# Patient Record
Sex: FEMALE | Race: WHITE | NOT HISPANIC OR LATINO | Employment: FULL TIME | URBAN - METROPOLITAN AREA
[De-identification: names, ages, dates, MRNs, and addresses within clinical notes are randomized per-mention and may not be internally consistent; named-entity substitution may affect disease eponyms.]

---

## 2017-10-25 ENCOUNTER — APPOINTMENT (OUTPATIENT)
Dept: GENERAL RADIOLOGY | Facility: HOSPITAL | Age: 27
End: 2017-10-25

## 2017-10-25 ENCOUNTER — HOSPITAL ENCOUNTER (EMERGENCY)
Facility: HOSPITAL | Age: 27
Discharge: HOME | End: 2017-10-25
Attending: EMERGENCY MEDICINE | Admitting: EMERGENCY MEDICINE

## 2017-10-25 VITALS
SYSTOLIC BLOOD PRESSURE: 110 MMHG | HEIGHT: 60 IN | DIASTOLIC BLOOD PRESSURE: 63 MMHG | HEART RATE: 74 BPM | WEIGHT: 170 LBS | BODY MASS INDEX: 33.38 KG/M2 | TEMPERATURE: 97.6 F | OXYGEN SATURATION: 99 % | RESPIRATION RATE: 18 BRPM

## 2017-10-25 DIAGNOSIS — F41.9 ANXIETY: Primary | ICD-10-CM

## 2017-10-25 LAB
ANION GAP SERPL CALCULATED.3IONS-SCNC: 11.7 MMOL/L
BASOPHILS # BLD AUTO: 0.02 10*3/MM3 (ref 0–0.2)
BASOPHILS NFR BLD AUTO: 0.3 % (ref 0–1.5)
BUN BLD-MCNC: 13 MG/DL (ref 6–20)
BUN/CREAT SERPL: 20.6 (ref 7–25)
CALCIUM SPEC-SCNC: 9.2 MG/DL (ref 8.6–10.5)
CHLORIDE SERPL-SCNC: 103 MMOL/L (ref 98–107)
CO2 SERPL-SCNC: 26.3 MMOL/L (ref 22–29)
CREAT BLD-MCNC: 0.63 MG/DL (ref 0.57–1)
D DIMER PPP FEU-MCNC: <0.27 MCGFEU/ML (ref 0–0.49)
DEPRECATED RDW RBC AUTO: 40.5 FL (ref 37–54)
EOSINOPHIL # BLD AUTO: 0.13 10*3/MM3 (ref 0–0.7)
EOSINOPHIL NFR BLD AUTO: 2.2 % (ref 0.3–6.2)
ERYTHROCYTE [DISTWIDTH] IN BLOOD BY AUTOMATED COUNT: 12 % (ref 11.7–13)
GFR SERPL CREATININE-BSD FRML MDRD: 115 ML/MIN/1.73
GLUCOSE BLD-MCNC: 93 MG/DL (ref 65–99)
HCG SERPL QL: NEGATIVE
HCT VFR BLD AUTO: 43.3 % (ref 35.6–45.5)
HGB BLD-MCNC: 14.1 G/DL (ref 11.9–15.5)
IMM GRANULOCYTES # BLD: 0 10*3/MM3 (ref 0–0.03)
IMM GRANULOCYTES NFR BLD: 0 % (ref 0–0.5)
LYMPHOCYTES # BLD AUTO: 1.48 10*3/MM3 (ref 0.9–4.8)
LYMPHOCYTES NFR BLD AUTO: 25 % (ref 19.6–45.3)
MCH RBC QN AUTO: 30.7 PG (ref 26.9–32)
MCHC RBC AUTO-ENTMCNC: 32.6 G/DL (ref 32.4–36.3)
MCV RBC AUTO: 94.3 FL (ref 80.5–98.2)
MONOCYTES # BLD AUTO: 0.42 10*3/MM3 (ref 0.2–1.2)
MONOCYTES NFR BLD AUTO: 7.1 % (ref 5–12)
NEUTROPHILS # BLD AUTO: 3.86 10*3/MM3 (ref 1.9–8.1)
NEUTROPHILS NFR BLD AUTO: 65.4 % (ref 42.7–76)
PLATELET # BLD AUTO: 186 10*3/MM3 (ref 140–500)
PMV BLD AUTO: 10.2 FL (ref 6–12)
POTASSIUM BLD-SCNC: 4.2 MMOL/L (ref 3.5–5.2)
RBC # BLD AUTO: 4.59 10*6/MM3 (ref 3.9–5.2)
SODIUM BLD-SCNC: 141 MMOL/L (ref 136–145)
WBC NRBC COR # BLD: 5.91 10*3/MM3 (ref 4.5–10.7)

## 2017-10-25 PROCEDURE — 85379 FIBRIN DEGRADATION QUANT: CPT | Performed by: NURSE PRACTITIONER

## 2017-10-25 PROCEDURE — 90791 PSYCH DIAGNOSTIC EVALUATION: CPT | Performed by: SOCIAL WORKER

## 2017-10-25 PROCEDURE — 85025 COMPLETE CBC W/AUTO DIFF WBC: CPT | Performed by: EMERGENCY MEDICINE

## 2017-10-25 PROCEDURE — 80048 BASIC METABOLIC PNL TOTAL CA: CPT | Performed by: EMERGENCY MEDICINE

## 2017-10-25 PROCEDURE — 84703 CHORIONIC GONADOTROPIN ASSAY: CPT | Performed by: EMERGENCY MEDICINE

## 2017-10-25 PROCEDURE — 99284 EMERGENCY DEPT VISIT MOD MDM: CPT

## 2017-10-25 PROCEDURE — 71020 HC CHEST PA AND LATERAL: CPT

## 2017-10-25 PROCEDURE — 96360 HYDRATION IV INFUSION INIT: CPT

## 2017-10-25 PROCEDURE — 93010 ELECTROCARDIOGRAM REPORT: CPT | Performed by: INTERNAL MEDICINE

## 2017-10-25 PROCEDURE — 93005 ELECTROCARDIOGRAM TRACING: CPT | Performed by: NURSE PRACTITIONER

## 2017-10-25 RX ADMIN — SODIUM CHLORIDE 500 ML: 9 INJECTION, SOLUTION INTRAVENOUS at 09:43

## 2017-10-25 NOTE — ED PROVIDER NOTES
Pt reported to ED for trouble breathing and anxiety.  Patient reports a dry cough.  Patient is a smoker.    Upon exam HENT is unremarkable, no lower extremity tenderness or edema. Lung clear, CV- RRR.    Xray neg    EKG           EKG time: 0939  Rhythm/Rate: Normal, 68  P waves and LA: Normal  QRS, axis: Normal   ST and T waves: Normal     Interpreted Contemporaneously by me, independently viewed  There is no prior for comparison    I supervised care provided by the midlevel provider.    We have discussed this patient's history, physical exam, and treatment plan.   I have reviewed the note and personally saw and examined the patient and agree with the plan of care.    Documentation assistance provided by keena Freeman for Dr. Gurrola.  Information recorded by the scribe was done at my direction and has been verified and validated by me.              Kaity Freeman  10/25/17 0946       Faisal Gurrola MD  10/25/17 1515       Faisal Gurrola MD  10/25/17 8052

## 2017-10-25 NOTE — DISCHARGE INSTRUCTIONS
Continue current home medications  Rest and relax  Follow up with pmd as needed  Follow up with outpatient counseling as per [facility] recommendations  Return to er for thoughts of harming yourself, worsening anxiety or any new or worsening symptoms

## 2017-10-25 NOTE — CONSULTS
26 y/o  cauc mother of a 9 year old coming to the ED due to some sleep issues and multiple stressors.  She take temazepam to help w/ sleep. Usually she has also the whole prescription at the end of the month.  Patient states she has been taking it several times a week lately.  She states her appetite is fine.  She reports high anxiety and constantly is thinking.  She states she is in constant need to take a deep breath b/c she thinks that she is expanding too deep. She states that she is working 3 jobs. 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 She is raising her 10 y/o son.  She works 3 jobs.  She denies any hx of SI or suicide attempts.  No hx of wishing to be dead.    Patient has been prescribed temazepam by her PMD.  She denies any other mental health treatment.    Patient lives w/ her 10 y/o son. She is employed in a full-time job and 2 part time jobs. One of the jobs is a  at [employer].  She has an associates degree and cosmotology degree.  She lives in a house in [facility].    Patient is alert and O X 4.  No SI or HI.  No a/v hallucinations. She denies being depressed.  She states it is anxiety.  Patient is open to seeing a mental health provider once she is able to get insurance.  She was provided a list of outpatient providers and informed about grief counseling through [facility]. Discussed case w/ her b/f and w/ KEVEN Eubanks.  Cha is agreeable w/ discharge. B/f denied concerns a/b safety.  .

## 2017-10-25 NOTE — ED TRIAGE NOTES
PT TO ED FOR SOA X3 WEEKS, CHEST TIGHTNESS X2 DAYS. States took Temazepam for her panic attacks twice in the last three weeks, last dose was Monday, denies improvement in symptoms

## 2017-10-25 NOTE — ED PROVIDER NOTES
EMERGENCY DEPARTMENT ENCOUNTER    CHIEF COMPLAINT  Chief Complaint: anxiety  History given by: patient  History limited by: N/A  Room Number:   PMD: Nubia Sinclair MD      HPI:  Pt is a 27 y.o. female who has longstanding hx of anxiety. She presents with worsening anxiety that started about 3 weeks ago. She has also had associated dyspnea and chest tightness. These sx start when she awakens in the morning and persist throughout the day. However, she is able to sleep at night without sx. She denies weakness, dizziness, fevers, chills, cough, abd pain, sweating, N/V/D, recent travel, and recent surgery. 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 She takes temazepam at night for sleep and has been taking it more frequently. She notes that she does not see a therapist or a psychiatrist for her anxiety. She is a smoker and has contraceptive implant in place. Past Medical History of anxiety.     Duration: started about 3 weeks ago  Timing: intermittent   Location: psych  Radiation: none  Quality: none  Intensity/Severity: moderate  Progression: worsening  Associated Symptoms: dyspnea, chest tightness  Aggravating Factors: increased stress   Alleviating Factors: none  Previous Episodes: Pt has longstanding hx of anxiety.   Treatment before arrival: none    PAST MEDICAL HISTORY  Active Ambulatory Problems     Diagnosis Date Noted   • No Active Ambulatory Problems     Resolved Ambulatory Problems     Diagnosis Date Noted   • No Resolved Ambulatory Problems     Past Medical History:   Diagnosis Date   • Anxiety        PAST SURGICAL HISTORY  Past Surgical History:   Procedure Laterality Date   • ADENOIDECTOMY     •  SECTION     • CHOLECYSTECTOMY     • MIDDLE EAR SURGERY     • MYRINGOTOMY W/ TUBES     • TONSILLECTOMY         FAMILY HISTORY  History reviewed. No pertinent family history.    SOCIAL  HISTORY  Social History     Social History   • Marital status: Single     Spouse name: N/A   • Number of children: N/A   • Years of education: N/A     Occupational History   • Not on file.     Social History Main Topics   • Smoking status: Current Every Day Smoker     Packs/day: 0.50     Types: Cigarettes   • Smokeless tobacco: Not on file   • Alcohol use Yes      Comment: occ   • Drug use: No   • Sexual activity: Not on file     Other Topics Concern   • Not on file     Social History Narrative   • No narrative on file         ALLERGIES  Prednisone    REVIEW OF SYSTEMS  Review of Systems   Constitutional: Negative for chills and fever.   HENT: Negative for sore throat.    Respiratory: Positive for chest tightness and shortness of breath.    Cardiovascular: Positive for chest pain (chest tightness).   Gastrointestinal: Negative for abdominal pain, diarrhea, nausea and vomiting.   Genitourinary: Negative for dysuria.   Musculoskeletal: Negative for back pain.   Skin: Negative for rash.   Neurological: Negative for dizziness.   Psychiatric/Behavioral: Negative for sleep disturbance. The patient is nervous/anxious (increased anxiety).        PHYSICAL EXAM  ED Triage Vitals   Temp Heart Rate Resp BP SpO2   10/25/17 0838 10/25/17 0838 10/25/17 0838 10/25/17 0845 10/25/17 0838   97.9 °F (36.6 °C) 111 20 125/88 98 % WNL     Physical Exam   Constitutional: She is oriented to person, place, and time and well-developed, well-nourished, and in no distress.   HENT:   Head: Normocephalic.   Mouth/Throat: Mucous membranes are normal.   Eyes: No scleral icterus.   Neck: Normal range of motion.   Cardiovascular: Normal rate, regular rhythm and normal heart sounds.    Pulmonary/Chest: Effort normal and breath sounds normal. No respiratory distress.   Abdominal: Soft. There is no tenderness. There is no rebound and no guarding.   Musculoskeletal: Normal range of motion.   Neurological: She is alert and oriented to person, place, and  time. She has normal motor skills and normal sensation.   Skin: Skin is warm and dry.   Psychiatric: Mood and affect normal.   Nursing note and vitals reviewed.      LAB RESULTS  Recent Results (from the past 24 hour(s))   D-dimer, Quantitative    Collection Time: 10/25/17  9:41 AM   Result Value Ref Range    D-Dimer, Quantitative <0.27 0.00 - 0.49 MCGFEU/mL   Basic Metabolic Panel    Collection Time: 10/25/17  9:41 AM   Result Value Ref Range    Glucose 93 65 - 99 mg/dL    BUN 13 6 - 20 mg/dL    Creatinine 0.63 0.57 - 1.00 mg/dL    Sodium 141 136 - 145 mmol/L    Potassium 4.2 3.5 - 5.2 mmol/L    Chloride 103 98 - 107 mmol/L    CO2 26.3 22.0 - 29.0 mmol/L    Calcium 9.2 8.6 - 10.5 mg/dL    eGFR Non African Amer 115 >60 mL/min/1.73    BUN/Creatinine Ratio 20.6 7.0 - 25.0    Anion Gap 11.7 mmol/L   hCG, Serum, Qualitative    Collection Time: 10/25/17  9:41 AM   Result Value Ref Range    HCG Qualitative Negative Indeterminate, Negative   CBC Auto Differential    Collection Time: 10/25/17  9:41 AM   Result Value Ref Range    WBC 5.91 4.50 - 10.70 10*3/mm3    RBC 4.59 3.90 - 5.20 10*6/mm3    Hemoglobin 14.1 11.9 - 15.5 g/dL    Hematocrit 43.3 35.6 - 45.5 %    MCV 94.3 80.5 - 98.2 fL    MCH 30.7 26.9 - 32.0 pg    MCHC 32.6 32.4 - 36.3 g/dL    RDW 12.0 11.7 - 13.0 %    RDW-SD 40.5 37.0 - 54.0 fl    MPV 10.2 6.0 - 12.0 fL    Platelets 186 140 - 500 10*3/mm3    Neutrophil % 65.4 42.7 - 76.0 %    Lymphocyte % 25.0 19.6 - 45.3 %    Monocyte % 7.1 5.0 - 12.0 %    Eosinophil % 2.2 0.3 - 6.2 %    Basophil % 0.3 0.0 - 1.5 %    Immature Grans % 0.0 0.0 - 0.5 %    Neutrophils, Absolute 3.86 1.90 - 8.10 10*3/mm3    Lymphocytes, Absolute 1.48 0.90 - 4.80 10*3/mm3    Monocytes, Absolute 0.42 0.20 - 1.20 10*3/mm3    Eosinophils, Absolute 0.13 0.00 - 0.70 10*3/mm3    Basophils, Absolute 0.02 0.00 - 0.20 10*3/mm3    Immature Grans, Absolute 0.00 0.00 - 0.03 10*3/mm3       I ordered the above labs and reviewed the results      RADIOLOGY             XR Chest 2 View (Final result) Result time: 10/25/17 10:51:14     Final result by Thang Guevara MD (10/25/17 10:51:14)     Narrative:     TWO VIEWS CHEST      HISTORY: Shortness of air.     FINDINGS: Two views of the chest demonstrates the heart to be within  normal limits in size. There is no evidence of focal infiltrate,  effusion or of congestive failure.     This report was finalized on 10/25/2017 10:51 AM by Dr. Thang Guevara MD.               I ordered the above noted radiological studies and reviewed the images on the PACS system.         EKG    EKG was interpreted by Dr. Gurrola. See Dr Gurrola's note for EKG interpretation.       PROGRESS AND CONSULTS  9:10 AM- Consulted [facility].   9:25 AM- Reviewed pt's history and workup with Dr. Gurrola.  At bedside evaluation, they agree with the plan of care.  9:38 AM- Ordered IV fluids for hydration.   11:07 AM- Rechecked pt. She is resting comfortably and is in no acute distress. Discussed with pt about all pertinent results including normal d-dimer, stable EKG findings, negative troponin, and nothing acute indicated on CXR. Informed pt that [facility] evaluation is pending.   12:20 PM- Discussed case with [facility] RN  Reviewed history, exam, results and treatments.  Discussed concerns and plan of care. [facility] RN has seen and evaluated pt in ED and has cleared pt for discharge. [facility] RN has provided pt with outpatient therapy/counseling referrals.   12:30 PM- Rechecked pt. She continues to rest comfortably and remains in no acute distress. Reviewed implications of results, diagnosis, meds, responsibility to follow up, warning signs and symptoms of possible worsening, potential complications and reasons to return to ER with patient.  Discussed all results and noted any abnormalities with patient.  Discussed absolute need to recheck abnormalities and condition with PMD and with outpatient therapy/counseling referrals as recommended by [facility].  Discussed plan  "for discharge, as there is no emergent indication for admission.  Pt is agreeable and understands need for follow up and repeat testing.  Pt is aware that discharge does not mean that nothing is wrong but it indicates no emergency is present.  Pt is discharged with instructions to follow up with primary care doctor to have their blood pressure rechecked.       DIAGNOSIS  Final diagnoses:   Anxiety       FOLLOW UP   Nubia Sinclair MD  [address] [place] [id number]  [phone]    Call  As needed    as per [facility] recommendation            COURSE & MEDICAL DECISION MAKING  Pertinent Labs and Imaging studies that were ordered and reviewed are noted above.  Results were reviewed/discussed with the patient and they were also made aware of online assess.   Pt also made aware that some labs, such as cultures, will not be resulted during ER visit and follow up with PMD is necessary.     MEDICATIONS GIVEN IN ER  Medications   sodium chloride 0.9 % bolus 500 mL (0 mL Intravenous Stopped 10/25/17 1050)       /70 (Patient Position: Lying)  Pulse 70  Temp 97.9 °F (36.6 °C) (Tympanic)   Resp 18  Ht 60\" (152.4 cm)  Wt 170 lb (77.1 kg)  LMP 10/17/2017  SpO2 97%  BMI 33.2 kg/m2      I personally reviewed the past medical history, past surgical history, social history, family history, current medications and allergies as they appear in this chart.  The scribe's note accurately reflects the work and decisions made by me.     Documentation assistance provided by keena Romero for ZENIA Schwartz on 10/25/2017 at 12:38 PM. Information recorded by the scribe was done at my direction and has been verified and validated by me.     Jessica Romero  10/25/17 1244       KEVEN Carr  10/25/17 1502    "

## 2018-04-23 ENCOUNTER — HOSPITAL ENCOUNTER (EMERGENCY)
Facility: HOSPITAL | Age: 28
Discharge: HOME | End: 2018-04-23
Attending: EMERGENCY MEDICINE | Admitting: EMERGENCY MEDICINE

## 2018-04-23 VITALS
DIASTOLIC BLOOD PRESSURE: 62 MMHG | TEMPERATURE: 98.7 F | BODY MASS INDEX: 33.13 KG/M2 | HEIGHT: 62 IN | SYSTOLIC BLOOD PRESSURE: 105 MMHG | WEIGHT: 180 LBS | OXYGEN SATURATION: 98 % | RESPIRATION RATE: 15 BRPM | HEART RATE: 84 BPM

## 2018-04-23 DIAGNOSIS — A08.4 VIRAL GASTROENTERITIS: Primary | ICD-10-CM

## 2018-04-23 LAB
ALBUMIN SERPL-MCNC: 4.3 G/DL (ref 3.5–5.2)
ALBUMIN/GLOB SERPL: 1.4 G/DL
ALP SERPL-CCNC: 49 U/L (ref 40–129)
ALT SERPL W P-5'-P-CCNC: 16 U/L (ref 5–33)
ANION GAP SERPL CALCULATED.3IONS-SCNC: 12.5 MMOL/L
AST SERPL-CCNC: 16 U/L (ref 5–32)
BACTERIA UR QL AUTO: ABNORMAL /HPF
BASOPHILS # BLD AUTO: 0.02 10*3/MM3 (ref 0–0.2)
BASOPHILS NFR BLD AUTO: 0.2 % (ref 0–2)
BILIRUB SERPL-MCNC: 0.9 MG/DL (ref 0.2–1.2)
BILIRUB UR QL STRIP: ABNORMAL
BUN BLD-MCNC: 11 MG/DL (ref 6–20)
BUN/CREAT SERPL: 16.2 (ref 7–25)
CALCIUM SPEC-SCNC: 9 MG/DL (ref 8.6–10.5)
CHLORIDE SERPL-SCNC: 100 MMOL/L (ref 98–107)
CLARITY UR: CLEAR
CO2 SERPL-SCNC: 24.5 MMOL/L (ref 22–29)
COLOR UR: YELLOW
CREAT BLD-MCNC: 0.68 MG/DL (ref 0.57–1)
DEPRECATED RDW RBC AUTO: 39.2 FL (ref 37–54)
EOSINOPHIL # BLD AUTO: 0.01 10*3/MM3 (ref 0.1–0.3)
EOSINOPHIL NFR BLD AUTO: 0.1 % (ref 0–4)
ERYTHROCYTE [DISTWIDTH] IN BLOOD BY AUTOMATED COUNT: 11.7 % (ref 11.5–14.5)
GFR SERPL CREATININE-BSD FRML MDRD: 105 ML/MIN/1.73
GLOBULIN UR ELPH-MCNC: 3.1 GM/DL
GLUCOSE BLD-MCNC: 128 MG/DL (ref 65–99)
GLUCOSE UR STRIP-MCNC: NEGATIVE MG/DL
HCG SERPL QL: NEGATIVE
HCT VFR BLD AUTO: 41.5 % (ref 37–47)
HGB BLD-MCNC: 13.8 G/DL (ref 12–16)
HGB UR QL STRIP.AUTO: ABNORMAL
HOLD SPECIMEN: NORMAL
HYALINE CASTS UR QL AUTO: ABNORMAL /LPF
IMM GRANULOCYTES # BLD: 0.03 10*3/MM3 (ref 0–0.03)
IMM GRANULOCYTES NFR BLD: 0.3 % (ref 0–0.5)
KETONES UR QL STRIP: NEGATIVE
LEUKOCYTE ESTERASE UR QL STRIP.AUTO: NEGATIVE
LIPASE SERPL-CCNC: 13 U/L (ref 13–60)
LYMPHOCYTES # BLD AUTO: 0.46 10*3/MM3 (ref 0.6–4.8)
LYMPHOCYTES NFR BLD AUTO: 4.4 % (ref 20–45)
MCH RBC QN AUTO: 30.3 PG (ref 27–31)
MCHC RBC AUTO-ENTMCNC: 33.3 G/DL (ref 31–37)
MCV RBC AUTO: 91.2 FL (ref 81–99)
MONOCYTES # BLD AUTO: 0.27 10*3/MM3 (ref 0–1)
MONOCYTES NFR BLD AUTO: 2.6 % (ref 3–8)
NEUTROPHILS # BLD AUTO: 9.75 10*3/MM3 (ref 1.5–8.3)
NEUTROPHILS NFR BLD AUTO: 92.4 % (ref 45–70)
NITRITE UR QL STRIP: NEGATIVE
NRBC BLD MANUAL-RTO: 0 /100 WBC (ref 0–0)
PH UR STRIP.AUTO: 6.5 [PH] (ref 4.5–8)
PLATELET # BLD AUTO: 209 10*3/MM3 (ref 140–500)
PMV BLD AUTO: 9.8 FL (ref 7.4–10.4)
POTASSIUM BLD-SCNC: 3.9 MMOL/L (ref 3.5–5.2)
PROT SERPL-MCNC: 7.4 G/DL (ref 6–8.5)
PROT UR QL STRIP: ABNORMAL
RBC # BLD AUTO: 4.55 10*6/MM3 (ref 4.2–5.4)
RBC # UR: ABNORMAL /HPF
REF LAB TEST METHOD: ABNORMAL
SODIUM BLD-SCNC: 137 MMOL/L (ref 136–145)
SP GR UR STRIP: 1.02 (ref 1–1.03)
SQUAMOUS #/AREA URNS HPF: ABNORMAL /HPF
UROBILINOGEN UR QL STRIP: ABNORMAL
WBC NRBC COR # BLD: 10.54 10*3/MM3 (ref 4.8–10.8)
WBC UR QL AUTO: ABNORMAL /HPF
WHOLE BLOOD HOLD SPECIMEN: NORMAL
WHOLE BLOOD HOLD SPECIMEN: NORMAL

## 2018-04-23 PROCEDURE — 81001 URINALYSIS AUTO W/SCOPE: CPT | Performed by: EMERGENCY MEDICINE

## 2018-04-23 PROCEDURE — 85025 COMPLETE CBC W/AUTO DIFF WBC: CPT

## 2018-04-23 PROCEDURE — 83690 ASSAY OF LIPASE: CPT | Performed by: EMERGENCY MEDICINE

## 2018-04-23 PROCEDURE — 25010000002 ONDANSETRON PER 1 MG: Performed by: EMERGENCY MEDICINE

## 2018-04-23 PROCEDURE — 99283 EMERGENCY DEPT VISIT LOW MDM: CPT

## 2018-04-23 PROCEDURE — 84703 CHORIONIC GONADOTROPIN ASSAY: CPT | Performed by: EMERGENCY MEDICINE

## 2018-04-23 PROCEDURE — 99284 EMERGENCY DEPT VISIT MOD MDM: CPT | Performed by: EMERGENCY MEDICINE

## 2018-04-23 PROCEDURE — 96374 THER/PROPH/DIAG INJ IV PUSH: CPT

## 2018-04-23 PROCEDURE — 96361 HYDRATE IV INFUSION ADD-ON: CPT

## 2018-04-23 PROCEDURE — 80053 COMPREHEN METABOLIC PANEL: CPT | Performed by: EMERGENCY MEDICINE

## 2018-04-23 RX ORDER — CITALOPRAM 20 MG/1
20 TABLET ORAL DAILY
COMMUNITY
End: 2020-08-03 | Stop reason: SDUPTHER

## 2018-04-23 RX ORDER — PROMETHAZINE HYDROCHLORIDE 25 MG/1
25 TABLET ORAL EVERY 6 HOURS PRN
COMMUNITY
End: 2019-04-10 | Stop reason: ALTCHOICE

## 2018-04-23 RX ORDER — ONDANSETRON 2 MG/ML
8 INJECTION INTRAMUSCULAR; INTRAVENOUS ONCE
Status: COMPLETED | OUTPATIENT
Start: 2018-04-23 | End: 2018-04-23

## 2018-04-23 RX ORDER — ONDANSETRON 8 MG/1
TABLET, ORALLY DISINTEGRATING ORAL
Qty: 12 TABLET | Refills: 0 | Status: SHIPPED | OUTPATIENT
Start: 2018-04-23 | End: 2020-06-16

## 2018-04-23 RX ORDER — SODIUM CHLORIDE 0.9 % (FLUSH) 0.9 %
10 SYRINGE (ML) INJECTION AS NEEDED
Status: DISCONTINUED | OUTPATIENT
Start: 2018-04-23 | End: 2018-04-23 | Stop reason: HOSPADM

## 2018-04-23 RX ADMIN — SODIUM CHLORIDE 1000 ML: 9 INJECTION, SOLUTION INTRAVENOUS at 10:25

## 2018-04-23 RX ADMIN — ONDANSETRON 8 MG: 2 INJECTION, SOLUTION INTRAMUSCULAR; INTRAVENOUS at 10:26

## 2018-04-23 NOTE — ED PROVIDER NOTES
Subjective     History provided by:  Patient    History of Present Illness    · Chief complaint: Nausea, vomiting, diarrhea, abdominal pain    · Location: Generalized mid abdominal pain    · Quality/Severity: The patient reports severe nausea and vomiting several times.  She is now holding some liquids down.  She's also had profuse watery diarrhea with 8-10 episodes.  She describes the abdominal pain is cramping that intensifies just before she has a bowel movement or vomits.    · Timing/Onset: Symptoms started last night after she ate supper.    · Modifying Factors: Any attempt to eat or drink something exacerbates the nausea and abdominal pain and diarrhea.    · Associated symptoms: She denies any fever.  She denies any discomfort with urination, but does report decreased urinary output.  She also reports lightheadedness and headache.    · Narrative: The patient is a 27-year-old white female who developed nausea, vomiting, diarrhea, and abdominal pain last night after eating.  The patient took a Phenergan, but has vomited several times in spite of it.  She states no one else that ate with her last night has similar symptoms her past medical history significant for anxiety disorder.  Past surgical history is significant for cystectomy and .  GYN history she is currently on her menstrual period and having the lab for birth control.  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    ED Triage Vitals [18 0941]   Temp Heart Rate Resp BP SpO2   98.7 °F (37.1 °C) 112 15 140/76 98 %      Temp src Heart Rate Source Patient Position BP Location FiO2 (%)   -- Monitor -- -- --       Review of Systems   Constitutional: Negative for activity change, appetite change, chills, diaphoresis, fatigue and fever.   HENT: Negative for congestion, dental problem, ear pain, hearing loss, mouth sores, postnasal drip, rhinorrhea, sinus pressure,  sore throat, trouble swallowing and voice change.    Eyes: Negative for photophobia, pain, discharge, redness and visual disturbance.   Respiratory: Negative for cough, chest tightness, shortness of breath, wheezing and stridor.    Cardiovascular: Negative for chest pain, palpitations and leg swelling.   Gastrointestinal: Positive for abdominal pain, diarrhea, nausea and vomiting. Negative for blood in stool.   Genitourinary: Positive for vaginal bleeding. Negative for difficulty urinating, dysuria, flank pain, frequency, hematuria and urgency.   Musculoskeletal: Negative for arthralgias, back pain, gait problem, joint swelling, myalgias, neck pain and neck stiffness.   Skin: Negative for color change and rash.   Neurological: Positive for dizziness, light-headedness and headaches. Negative for tremors, seizures, syncope, facial asymmetry, speech difficulty, weakness and numbness.   Hematological: Negative for adenopathy.   Psychiatric/Behavioral: Negative.  Negative for confusion and decreased concentration. The patient is not nervous/anxious.        Past Medical History:   Diagnosis Date   • Anxiety        Allergies   Allergen Reactions   • Prednisone Hives       Past Surgical History:   Procedure Laterality Date   • ADENOIDECTOMY     •  SECTION     • CHOLECYSTECTOMY     • MIDDLE EAR SURGERY     • MYRINGOTOMY W/ TUBES     • TONSILLECTOMY         History reviewed. No pertinent family history.    Social History     Social History   • Marital status: Single     Social History Main Topics   • Smoking status: Former Smoker     Packs/day: 0.00     Years: 0.50      Comment: quit 3 months ago   • Alcohol use Yes      Comment: occ 1 GLASS WINE 2 to 3 x's week   • Drug use: No     Other Topics Concern   • Not on file           Objective   Physical Exam   Constitutional: She is oriented to person, place, and time. She appears well-developed and well-nourished. No distress.   Patient appears healthy in no acute  distress.  Review of raw vital signs: Her blood pressure was 140/76, she was tachycardic with a heart rate of 112, respirations 15, she was afebrile, oxygen saturation normal 98%.   HENT:   Head: Normocephalic and atraumatic.   Nose: Nose normal.   Mouth/Throat: Oropharynx is clear and moist. No oropharyngeal exudate.   Eyes: EOM are normal. Pupils are equal, round, and reactive to light. Right eye exhibits no discharge. Left eye exhibits no discharge. No scleral icterus.   Neck: Normal range of motion. Neck supple. No JVD present. No thyromegaly present.   Cardiovascular: Regular rhythm and normal heart sounds.    No murmur heard.  Mild tachycardia   Pulmonary/Chest: Effort normal and breath sounds normal. She has no wheezes. She has no rales. She exhibits no tenderness.   Abdominal: Soft. Bowel sounds are normal. She exhibits no distension. There is no tenderness.   Musculoskeletal: Normal range of motion. She exhibits no edema, tenderness or deformity.   Lymphadenopathy:     She has no cervical adenopathy.   Neurological: She is alert and oriented to person, place, and time. No cranial nerve deficit. Coordination normal.   No focal motor sensory deficit   Skin: Skin is warm and dry. Capillary refill takes less than 2 seconds. No rash noted. She is not diaphoretic.   Psychiatric: She has a normal mood and affect. Her behavior is normal. Judgment and thought content normal.   Nursing note and vitals reviewed.      Procedures         ED Course  ED Course   Comment By Time   The patient was administered a liter of normal saline IV and Zofran 8 mg IV which improved her nausea.  Review of her laboratory studies: Her CMP had normal electrolytes, normal renal and liver function tests.  Her BUN was normal at 11 suggesting euvolemia.  Her white blood cell count was 10.5 with a left shift.  Hemoglobin and hematocrit within normal limits.  Her urinalysis had specific gravity of 1.02, was negative for ketones, under  microscopic exam had 20-30 red blood cells with the patient is currently on her menstrual period, she had 3-5 white blood cells. Williams Milan MD 04/23 1606                  MDM  Number of Diagnoses or Management Options  Viral gastroenteritis: new and requires workup     Amount and/or Complexity of Data Reviewed  Clinical lab tests: ordered and reviewed    Patient Progress  Patient progress: improved      Final diagnoses:   Viral gastroenteritis           Labs Reviewed   COMPREHENSIVE METABOLIC PANEL - Abnormal; Notable for the following:        Result Value    Glucose 128 (*)     All other components within normal limits   URINALYSIS W/ CULTURE IF INDICATED - Abnormal; Notable for the following:     Bilirubin, UA Small (1+) (*)     Blood, UA Moderate (2+) (*)     Protein, UA 30 mg/dL (1+) (*)     All other components within normal limits   CBC WITH AUTO DIFFERENTIAL - Abnormal; Notable for the following:     Neutrophil % 92.4 (*)     Lymphocyte % 4.4 (*)     Monocyte % 2.6 (*)     Neutrophils, Absolute 9.75 (*)     Lymphocytes, Absolute 0.46 (*)     Eosinophils, Absolute 0.01 (*)     All other components within normal limits   URINALYSIS, MICROSCOPIC ONLY - Abnormal; Notable for the following:     RBC, UA 21-30 (*)     WBC, UA 3-5 (*)     Bacteria, UA Trace (*)     All other components within normal limits   LIPASE - Normal   HCG, SERUM, QUALITATIVE - Normal   RAINBOW DRAW    Narrative:     The following orders were created for panel order [place] Draw.  Procedure                               Abnormality         Status                     ---------                               -----------         ------                     Light Blue Top[[id number]]                                    Final result               Green Top (Gel)[[id number]]                                   Final result               Lavender Top[[id number]]                                      Final result               Gold Top - SST[[id number]]                                                                  Please view results for these tests on the individual orders.   CBC AND DIFFERENTIAL    Narrative:     The following orders were created for panel order CBC & Differential.  Procedure                               Abnormality         Status                     ---------                               -----------         ------                     CBC Auto Differential[[id number]]        Abnormal            Final result                 Please view results for these tests on the individual orders.   LIGHT BLUE TOP   GREEN TOP   LAVENDER TOP     No orders to display          Medication List      New Prescriptions    ondansetron ODT 8 MG disintegrating tablet  Commonly known as:  ZOFRAN-ODT  One tablet po q 6 hours PRN nausea and vomiting               Williams Milan MD  04/23/18 4019

## 2019-04-09 ENCOUNTER — DOCUMENTATION (OUTPATIENT)
Dept: CARDIOLOGY | Facility: CLINIC | Age: 29
End: 2019-04-09

## 2019-04-10 ENCOUNTER — OFFICE VISIT (OUTPATIENT)
Dept: CARDIOLOGY | Facility: CLINIC | Age: 29
End: 2019-04-10

## 2019-04-10 VITALS
DIASTOLIC BLOOD PRESSURE: 78 MMHG | WEIGHT: 204 LBS | BODY MASS INDEX: 38.51 KG/M2 | HEIGHT: 61 IN | HEART RATE: 78 BPM | SYSTOLIC BLOOD PRESSURE: 110 MMHG

## 2019-04-10 DIAGNOSIS — R00.2 PALPITATIONS: Primary | ICD-10-CM

## 2019-04-10 PROCEDURE — 99204 OFFICE O/P NEW MOD 45 MIN: CPT | Performed by: INTERNAL MEDICINE

## 2019-04-10 PROCEDURE — 93000 ELECTROCARDIOGRAM COMPLETE: CPT | Performed by: INTERNAL MEDICINE

## 2019-04-12 PROBLEM — I47.1 PAROXYSMAL SVT (SUPRAVENTRICULAR TACHYCARDIA) (HCC): Status: ACTIVE | Noted: 2019-04-12

## 2019-04-12 PROBLEM — I47.10 PAROXYSMAL SVT (SUPRAVENTRICULAR TACHYCARDIA): Status: ACTIVE | Noted: 2019-04-12

## 2019-04-12 NOTE — PROGRESS NOTES
Subjective:     Encounter Date:04/10/2019      Patient ID: Theresa Dyson is a 28 y.o. female.    Chief Complaint:  Palpitations    This is a recurrent problem. The current episode started more than 1 year ago. The problem occurs intermittently.       This is a 28-year-old white female who presents today for episodes of palpitations.  She said that about 6-8 years ago was when she first noticed him.  She said she could feel her heart skip and then race.  She said in the past month the frequency has increased to almost a daily occurrence.  She classically describes a skipped beat and then her heart starts to race going up to as high as 220 bpm.  She says it feels like her heart is going to pound out of her chest.  Over the years she has learned to do vagal maneuvers and she can usually break this.  She says however here recently that has been getting more and more difficult to do.  She had a Holter monitor 6 or 7 years ago but had no symptoms during the monitor.  Patient has not passed out.  One time by a nurse taking vital signs she did have a heart rate of 220 although is never been able to be captured on the monitor or ECG.      Review of Systems   Cardiovascular: Positive for palpitations.   All other systems reviewed and are negative.        ECG 12 Lead  Date/Time: 4/10/2019 11:31 AM  Performed by: Wiley Koch MD  Authorized by: Wiley Koch MD   Comparison: compared with previous ECG from 10/25/2017  Similar to previous ECG  Rhythm: sinus rhythm    Clinical impression: normal ECG               Objective:     Physical Exam   Constitutional: She is oriented to person, place, and time. She appears well-developed.   HENT:   Head: Normocephalic.   Eyes: Conjunctivae are normal.   Neck: Normal range of motion.   Cardiovascular: Normal rate, regular rhythm and normal heart sounds.   Pulmonary/Chest: Breath sounds normal.   Abdominal: Soft. Bowel sounds are normal.   Musculoskeletal: Normal range of  motion. She exhibits no edema.   Neurological: She is alert and oriented to person, place, and time.   Skin: Skin is warm and dry.   Psychiatric: She has a normal mood and affect. Her behavior is normal.   Vitals reviewed.      Lab Review:       Assessment:          Diagnosis Plan   1. Palpitations  Adult Transthoracic Echo Complete W/ Cont if Necessary Per Protocol          Plan:         1.  Palpitations.  Patient describes a classic SVT that is induced by a PVC.  She either has AVNRT or an accessory bypass tract.  Over the years she is learned to do vagal maneuvers to break it.  It started at about the age of 20 and has increased in frequency.  This is really classic presentation.  I will check an echocardiogram to rule out structural heart disease we had an extensive discussion about approaches at this point I think she just needs an EP study.  We discussed whether she wanted to see the electrophysiologist first or just meet him on the day of the study she is obviously going to do some research on the Internet.  When she decides what she wants to do I would proceed and setting her up.

## 2019-05-01 ENCOUNTER — HOSPITAL ENCOUNTER (OUTPATIENT)
Dept: CARDIOLOGY | Facility: HOSPITAL | Age: 29
Discharge: HOME | End: 2019-05-01
Admitting: INTERNAL MEDICINE

## 2019-05-01 VITALS
BODY MASS INDEX: 38.51 KG/M2 | OXYGEN SATURATION: 97 % | DIASTOLIC BLOOD PRESSURE: 70 MMHG | HEART RATE: 55 BPM | WEIGHT: 204 LBS | SYSTOLIC BLOOD PRESSURE: 100 MMHG | HEIGHT: 61 IN

## 2019-05-01 DIAGNOSIS — R00.2 PALPITATIONS: ICD-10-CM

## 2019-05-01 LAB
AORTIC ARCH: 2.7 CM
AORTIC DIMENSIONLESS INDEX: 0.6 (DI)
ASCENDING AORTA: 2.9 CM
BH CV ECHO MEAS - ACS: 2.1 CM
BH CV ECHO MEAS - AO MAX PG (FULL): 2.9 MMHG
BH CV ECHO MEAS - AO MAX PG: 6.2 MMHG
BH CV ECHO MEAS - AO MEAN PG (FULL): 2 MMHG
BH CV ECHO MEAS - AO MEAN PG: 3.8 MMHG
BH CV ECHO MEAS - AO ROOT AREA (BSA CORRECTED): 1.6
BH CV ECHO MEAS - AO ROOT AREA: 7 CM^2
BH CV ECHO MEAS - AO ROOT DIAM: 3 CM
BH CV ECHO MEAS - AO V2 MAX: 124.9 CM/SEC
BH CV ECHO MEAS - AO V2 MEAN: 93.8 CM/SEC
BH CV ECHO MEAS - AO V2 VTI: 25.7 CM
BH CV ECHO MEAS - ASC AORTA: 2.9 CM
BH CV ECHO MEAS - AVA(I,A): 2.2 CM^2
BH CV ECHO MEAS - AVA(I,D): 2.2 CM^2
BH CV ECHO MEAS - AVA(V,A): 2.5 CM^2
BH CV ECHO MEAS - AVA(V,D): 2.5 CM^2
BH CV ECHO MEAS - BSA(HAYCOCK): 2 M^2
BH CV ECHO MEAS - BSA: 1.9 M^2
BH CV ECHO MEAS - BZI_BMI: 38.5 KILOGRAMS/M^2
BH CV ECHO MEAS - BZI_METRIC_HEIGHT: 154.9 CM
BH CV ECHO MEAS - BZI_METRIC_WEIGHT: 92.5 KG
BH CV ECHO MEAS - EDV(MOD-SP2): 76 ML
BH CV ECHO MEAS - EDV(MOD-SP4): 69 ML
BH CV ECHO MEAS - EDV(TEICH): 127.4 ML
BH CV ECHO MEAS - EF(CUBED): 72.3 %
BH CV ECHO MEAS - EF(MOD-BP): 61 %
BH CV ECHO MEAS - EF(MOD-SP2): 57.9 %
BH CV ECHO MEAS - EF(MOD-SP4): 65.2 %
BH CV ECHO MEAS - EF(TEICH): 63.7 %
BH CV ECHO MEAS - ESV(MOD-SP2): 32 ML
BH CV ECHO MEAS - ESV(MOD-SP4): 24 ML
BH CV ECHO MEAS - ESV(TEICH): 46.3 ML
BH CV ECHO MEAS - FS: 34.8 %
BH CV ECHO MEAS - IVS/LVPW: 0.96
BH CV ECHO MEAS - IVSD: 1 CM
BH CV ECHO MEAS - LAT PEAK E' VEL: 12 CM/SEC
BH CV ECHO MEAS - LV DIASTOLIC VOL/BSA (35-75): 36.2 ML/M^2
BH CV ECHO MEAS - LV MASS(C)D: 201.4 GRAMS
BH CV ECHO MEAS - LV MASS(C)DI: 105.7 GRAMS/M^2
BH CV ECHO MEAS - LV MAX PG: 3.3 MMHG
BH CV ECHO MEAS - LV MEAN PG: 1.9 MMHG
BH CV ECHO MEAS - LV SYSTOLIC VOL/BSA (12-30): 12.6 ML/M^2
BH CV ECHO MEAS - LV V1 MAX: 90.7 CM/SEC
BH CV ECHO MEAS - LV V1 MEAN: 63.3 CM/SEC
BH CV ECHO MEAS - LV V1 VTI: 15.8 CM
BH CV ECHO MEAS - LVIDD: 5.2 CM
BH CV ECHO MEAS - LVIDS: 3.4 CM
BH CV ECHO MEAS - LVLD AP2: 7.7 CM
BH CV ECHO MEAS - LVLD AP4: 7.5 CM
BH CV ECHO MEAS - LVLS AP2: 6.1 CM
BH CV ECHO MEAS - LVLS AP4: 5.8 CM
BH CV ECHO MEAS - LVOT AREA (M): 3.5 CM^2
BH CV ECHO MEAS - LVOT AREA: 3.5 CM^2
BH CV ECHO MEAS - LVOT DIAM: 2.1 CM
BH CV ECHO MEAS - LVPWD: 1.1 CM
BH CV ECHO MEAS - MED PEAK E' VEL: 9 CM/SEC
BH CV ECHO MEAS - MV A DUR: 0.1 SEC
BH CV ECHO MEAS - MV A MAX VEL: 72.8 CM/SEC
BH CV ECHO MEAS - MV DEC SLOPE: 578.3 CM/SEC^2
BH CV ECHO MEAS - MV DEC TIME: 0.15 SEC
BH CV ECHO MEAS - MV E MAX VEL: 59.7 CM/SEC
BH CV ECHO MEAS - MV E/A: 0.82
BH CV ECHO MEAS - MV MAX PG: 5.3 MMHG
BH CV ECHO MEAS - MV MEAN PG: 2.2 MMHG
BH CV ECHO MEAS - MV P1/2T MAX VEL: 86.9 CM/SEC
BH CV ECHO MEAS - MV P1/2T: 44 MSEC
BH CV ECHO MEAS - MV V2 MAX: 115.4 CM/SEC
BH CV ECHO MEAS - MV V2 MEAN: 67.3 CM/SEC
BH CV ECHO MEAS - MV V2 VTI: 29.6 CM
BH CV ECHO MEAS - MVA P1/2T LCG: 2.5 CM^2
BH CV ECHO MEAS - MVA(P1/2T): 5 CM^2
BH CV ECHO MEAS - MVA(VTI): 1.9 CM^2
BH CV ECHO MEAS - PA ACC TIME: 0.09 SEC
BH CV ECHO MEAS - PA MAX PG (FULL): 3.4 MMHG
BH CV ECHO MEAS - PA MAX PG: 6.2 MMHG
BH CV ECHO MEAS - PA PR(ACCEL): 37.8 MMHG
BH CV ECHO MEAS - PA V2 MAX: 124.9 CM/SEC
BH CV ECHO MEAS - PI END-D VEL: 119.4 CM/SEC
BH CV ECHO MEAS - PULM A REVS DUR: 0.1 SEC
BH CV ECHO MEAS - PULM A REVS VEL: 25.2 CM/SEC
BH CV ECHO MEAS - PULM DIAS VEL: 60.1 CM/SEC
BH CV ECHO MEAS - PULM S/D: 0.87
BH CV ECHO MEAS - PULM SYS VEL: 52.4 CM/SEC
BH CV ECHO MEAS - PVA(V,A): 2.6 CM^2
BH CV ECHO MEAS - PVA(V,D): 2.6 CM^2
BH CV ECHO MEAS - QP/QS: 1.4
BH CV ECHO MEAS - RAP SYSTOLE: 3 MMHG
BH CV ECHO MEAS - RV MAX PG: 2.8 MMHG
BH CV ECHO MEAS - RV MEAN PG: 1.5 MMHG
BH CV ECHO MEAS - RV V1 MAX: 83.9 CM/SEC
BH CV ECHO MEAS - RV V1 MEAN: 54.5 CM/SEC
BH CV ECHO MEAS - RV V1 VTI: 19.6 CM
BH CV ECHO MEAS - RVOT AREA: 3.9 CM^2
BH CV ECHO MEAS - RVOT DIAM: 2.2 CM
BH CV ECHO MEAS - RVSP: 19 MMHG
BH CV ECHO MEAS - SI(AO): 95.3 ML/M^2
BH CV ECHO MEAS - SI(CUBED): 52.2 ML/M^2
BH CV ECHO MEAS - SI(LVOT): 29.1 ML/M^2
BH CV ECHO MEAS - SI(MOD-SP2): 23.1 ML/M^2
BH CV ECHO MEAS - SI(MOD-SP4): 23.6 ML/M^2
BH CV ECHO MEAS - SI(TEICH): 42.6 ML/M^2
BH CV ECHO MEAS - SUP REN AO DIAM: 1.95 CM
BH CV ECHO MEAS - SV(AO): 181.5 ML
BH CV ECHO MEAS - SV(CUBED): 99.5 ML
BH CV ECHO MEAS - SV(LVOT): 55.5 ML
BH CV ECHO MEAS - SV(MOD-SP2): 44 ML
BH CV ECHO MEAS - SV(MOD-SP4): 45 ML
BH CV ECHO MEAS - SV(RVOT): 75.7 ML
BH CV ECHO MEAS - SV(TEICH): 81.1 ML
BH CV ECHO MEAS - TAPSE (>1.6): 1.8 CM2
BH CV ECHO MEAS - TR MAX VEL: 191.4 CM/SEC
BH CV ECHO MEASUREMENTS AVERAGE E/E' RATIO: 5.69
BH CV VAS BP RIGHT ARM: NORMAL MMHG
BH CV XLRA - RV BASE: 2.4 CM
BH CV XLRA - TDI S': 11 CM/SEC
LEFT ATRIUM VOLUME INDEX: 17 ML/M2
LV EF 2D ECHO EST: 61 %
SINUS: 2.8 CM
STJ: 2.8 CM

## 2019-05-01 PROCEDURE — 93306 TTE W/DOPPLER COMPLETE: CPT

## 2019-05-01 PROCEDURE — 93306 TTE W/DOPPLER COMPLETE: CPT | Performed by: INTERNAL MEDICINE

## 2020-01-24 ENCOUNTER — OFFICE VISIT (OUTPATIENT)
Dept: OBSTETRICS AND GYNECOLOGY | Facility: CLINIC | Age: 30
End: 2020-01-24

## 2020-01-24 VITALS
SYSTOLIC BLOOD PRESSURE: 118 MMHG | BODY MASS INDEX: 42.97 KG/M2 | DIASTOLIC BLOOD PRESSURE: 62 MMHG | WEIGHT: 227.6 LBS | HEIGHT: 61 IN

## 2020-01-24 DIAGNOSIS — Z30.46 NEXPLANON REMOVAL: ICD-10-CM

## 2020-01-24 DIAGNOSIS — Z01.419 ROUTINE GYNECOLOGICAL EXAMINATION: Primary | ICD-10-CM

## 2020-01-24 DIAGNOSIS — Z30.430 ENCOUNTER FOR IUD INSERTION: ICD-10-CM

## 2020-01-24 DIAGNOSIS — Z13.9 SCREENING FOR CONDITION: ICD-10-CM

## 2020-01-24 DIAGNOSIS — Z01.419 PAP SMEAR, LOW-RISK: ICD-10-CM

## 2020-01-24 LAB
B-HCG UR QL: NEGATIVE
BILIRUB BLD-MCNC: NEGATIVE MG/DL
CLARITY, POC: CLEAR
COLOR UR: YELLOW
GLUCOSE UR STRIP-MCNC: NEGATIVE MG/DL
INTERNAL NEGATIVE CONTROL: NEGATIVE
INTERNAL POSITIVE CONTROL: POSITIVE
KETONES UR QL: NEGATIVE
LEUKOCYTE EST, POC: NEGATIVE
Lab: 55
NITRITE UR-MCNC: NEGATIVE MG/ML
PH UR: 5 [PH] (ref 5–8)
PROT UR STRIP-MCNC: NEGATIVE MG/DL
RBC # UR STRIP: NEGATIVE /UL
SP GR UR: 1 (ref 1–1.03)
UROBILINOGEN UR QL: NORMAL

## 2020-01-24 PROCEDURE — 11982 REMOVE DRUG IMPLANT DEVICE: CPT | Performed by: OBSTETRICS & GYNECOLOGY

## 2020-01-24 PROCEDURE — 81025 URINE PREGNANCY TEST: CPT | Performed by: OBSTETRICS & GYNECOLOGY

## 2020-01-24 PROCEDURE — 58300 INSERT INTRAUTERINE DEVICE: CPT | Performed by: OBSTETRICS & GYNECOLOGY

## 2020-01-24 PROCEDURE — 99385 PREV VISIT NEW AGE 18-39: CPT | Performed by: OBSTETRICS & GYNECOLOGY

## 2020-01-24 PROCEDURE — 81002 URINALYSIS NONAUTO W/O SCOPE: CPT | Performed by: OBSTETRICS & GYNECOLOGY

## 2020-01-28 NOTE — PROGRESS NOTES
Procedure: Intrauterine device insertion    Chief Complaint: IUD insertion    Pre procedure indication 1) Desires Kyleena IUD  Post procedure indication 1) Desires Kyleena IUD    The risks, benefits, and alternatives to IUD were explained at length with the patient. All her questions were answered and consents were signed.    The patient was placed in a dorsal lithotomy position on the examining table in [facility]. A bimanual exam confirmed the uterus was normal in size, anteverted. A warmed metal speculum was inserted into the vagina and the cervix was brought into view.    The cervix was prepped with Betadine. The anterior lip was grasped with a single-tooth tenaculum. The endometrial cavity was then sounded to 7 cm without use of a dilator. The IUD was removed in a sterile fashion.    The  was then carefully advanced to the cervical canal into the uterus to the level of the fundus. This was then backed off about 1.5-2 cm to allow sufficient space for the arms to open. The device was deployed. The  was removed carefully from the uterus. The threads were then cut leaving 2-3 cm visible outside of the cervix.  The single-tooth tenaculum was removed from the anterior lip. Good hemostasis was noted.     All other instruments were removed from the vagina.   There were no complications.  The patient tolerated the procedure well with a minimal amount of discomfort.    The patient was counseled about the need to return in 4 weeks for string check.     She was counseled about the need to use a backup method of contraception such as condoms for 1-2 weeks. The patient is counseled to contact us if she has any significant or increasing bleeding, pain, fever, chills, or other concerns. She is instructed to see a doctor right away if she believes that she may be pregnant at any time with the IUD in place.    Estella Soria, DO    1/28/2020  2:43 PM

## 2020-01-28 NOTE — PROGRESS NOTES
Procedure: Nexplanon removal    Chief Complaint: Nexplanon removal    Procedures      Pre Dx: 1) Nexplanon removal  Post Dx: 1) Nexplanon removal     The risks, benefits, and alternatives to remove the device have been discussed with the patient at length. All of her questions are answered. She is aware of the need for alternative means of contraception if desired. Verbal informed consent is obtained.    Able to easily palpate the device in the nondominant left arm. Additional imaging was not required. The device feels freely mobile and easily accessible. She was placed in the  supine position with her arm elevated at her side. The skin over this site is prepped with Betadine. 2-3 mL of 1% lidocaine with no epinephrine was injected.  Downward pressure was applied on the end of the implant nearest the axilla, and a 2-3 mm incision was made in a longitudinal direction of the arm at the tip of the implant closest to the elbow. The implant was then pushed gently toward the incision until the tip was visible. The fibrous capsule was opened with blunt dissection using a hemostat. The implant was grasp with a hemostat and was easily removed intact. It measured a  full 4 cm in length.    Tolerated well  No apparent complications    The skin was cleansed and dried. A Steri-Strip was applied. The arm was gently wrapped with Kerlex gauze. The patient had normal strength and sensation in her upper extremity. There was no significant bleeding. There were no complications. The patient was advised about proper care of the dressings. She was advised to call or return for complications such as fever, signs of infection, increased pain, or any other concerns.    Warning signs, limitations and expectations reviewed.     Estella Soria DO    1/28/2020  2:42 PM

## 2020-01-29 LAB
C TRACH RRNA CVX QL NAA+PROBE: NEGATIVE
CONV .: ABNORMAL
CYTOLOGIST CVX/VAG CYTO: ABNORMAL
CYTOLOGY CVX/VAG DOC CYTO: ABNORMAL
CYTOLOGY CVX/VAG DOC THIN PREP: ABNORMAL
DX ICD CODE: ABNORMAL
DX ICD CODE: ABNORMAL
HBV SURFACE AG SERPL QL IA: NEGATIVE
HCV AB S/CO SERPL IA: 0.1 S/CO RATIO (ref 0–0.9)
HIV 1 & 2 AB SER-IMP: ABNORMAL
HIV 1+2 AB+HIV1 P24 AG SERPL QL IA: NON REACTIVE
HSV1 IGG SER IA-ACNC: <0.91 INDEX (ref 0–0.9)
HSV2 IGG SER IA-ACNC: <0.91 INDEX (ref 0–0.9)
N GONORRHOEA RRNA CVX QL NAA+PROBE: NEGATIVE
OTHER STN SPEC: ABNORMAL
PATHOLOGIST CVX/VAG CYTO: ABNORMAL
RECOM F/U CVX/VAG CYTO: ABNORMAL
RPR SER QL: NON REACTIVE
STAT OF ADQ CVX/VAG CYTO-IMP: ABNORMAL
T VAGINALIS RRNA SPEC QL NAA+PROBE: NEGATIVE

## 2020-02-27 ENCOUNTER — OFFICE VISIT (OUTPATIENT)
Dept: OBSTETRICS AND GYNECOLOGY | Facility: CLINIC | Age: 30
End: 2020-02-27

## 2020-02-27 VITALS
WEIGHT: 221 LBS | HEIGHT: 61 IN | BODY MASS INDEX: 41.72 KG/M2 | SYSTOLIC BLOOD PRESSURE: 120 MMHG | DIASTOLIC BLOOD PRESSURE: 78 MMHG

## 2020-02-27 DIAGNOSIS — Z30.431 IUD CHECK UP: ICD-10-CM

## 2020-02-27 DIAGNOSIS — Z13.9 SCREENING FOR CONDITION: ICD-10-CM

## 2020-02-27 DIAGNOSIS — N63.20 LEFT BREAST MASS: ICD-10-CM

## 2020-02-27 DIAGNOSIS — R87.612 LGSIL ON PAP SMEAR OF CERVIX: Primary | ICD-10-CM

## 2020-02-27 LAB
B-HCG UR QL: NEGATIVE
INTERNAL NEGATIVE CONTROL: NEGATIVE
INTERNAL POSITIVE CONTROL: POSITIVE
Lab: NORMAL

## 2020-02-27 PROCEDURE — 57455 BIOPSY OF CERVIX W/SCOPE: CPT | Performed by: OBSTETRICS & GYNECOLOGY

## 2020-02-27 PROCEDURE — 81025 URINE PREGNANCY TEST: CPT | Performed by: OBSTETRICS & GYNECOLOGY

## 2020-02-27 PROCEDURE — 99213 OFFICE O/P EST LOW 20 MIN: CPT | Performed by: OBSTETRICS & GYNECOLOGY

## 2020-02-27 RX ORDER — LEVONORGESTREL 19.5 MG/1
1 INTRAUTERINE DEVICE INTRAUTERINE ONCE
COMMUNITY
End: 2020-10-26

## 2020-02-27 NOTE — PROGRESS NOTES
Colposcopy Procedure Note    Chief Complaint: LGSIL pap smear    Colposcopy  Date/Time: 2/27/2020 1:49 PM  Performed by: Estella Soria DO  Authorized by: Estella Soria DO   Preparation: Patient was prepped and draped in the usual sterile fashion.  Local anesthesia used: no    Anesthesia:  Local anesthesia used: no    Sedation:  Patient sedated: no    Patient tolerance: Patient tolerated the procedure well with no immediate complications          Indications: Most recent Pap smear showed: low-grade squamous intraepithelial neoplasia (LGSIL - encompassing HPV,mild dysplasia,DEBO I).  Prior cervical/vaginal disease: none.  Prior cervical treatment: no treatment.    Procedure Details   The risks and benefits of the procedure and Verbal informed consent obtained.    Speculum placed in vagina and excellent visualization of cervix achieved, cervix swabbed x 3 with acetic acid solution and Lugol's solution     Findings:  Cervix: acetowhite lesion(s) noted at 1 o'clock; cervical biopsies taken at 1 o'clock, specimen labelled and sent to pathology and hemostasis achieved with Monsel's solution.  Vaginal inspection: vaginal colposcopy not performed.  Vulvar colposcopy: vulvar colposcopy not performed.    Specimens: cervical biopsy    Complications: none.    Plan:  Specimens labelled and sent to Pathology.  Will base further treatment on Pathology findings.  Treatment options discussed with patient.    Estella Soria DO   2/27/2020  1:48 PM

## 2020-02-27 NOTE — PROGRESS NOTES
"PROBLEM VISIT    Chief Complaint: LGSIL, right breast mass, IUD check up      Theresa Dyson is a 29 y.o. patient who presents for follow up of her IUD that was placed approximately 4 weeks ago.  Patient reports that she is still bleeding although it is decreasing in amount.  Patient denies any cramping.  She denies any uncomfortability with sexual activity.  Patient had a friend who was recently diagnosed with breast cancer and her friend had the IUD as well.  The patient is questioning if the IUD can cause breast cancer.  Patient had her IUD placed at the same time of her annual on her Pap smear is returned as a low-grade squamous intraepithelial lesion.  Patient presents today also for colposcopy exam.  Patient complaining today about a left breast mass that she noted herself.  4 weeks ago at her annual exam nothing was noted and patient states that is something she noticed within the last week. It is nontender to palpation.  She has not noticed this growing in size.    Chief Complaint   Patient presents with   • Colposcopy     LGSIL   • Follow-up     string check   • Breast Problem             The following portions of the patient's history were reviewed and updated as appropriate: allergies, current medications and problem list.    Review of Systems   Constitutional: Negative for appetite change, chills, fatigue, fever and unexpected weight change.   Gastrointestinal: Negative for abdominal distention, abdominal pain, anal bleeding, blood in stool, constipation, diarrhea, nausea and vomiting.   Genitourinary: Positive for vaginal bleeding. Negative for dyspareunia, dysuria, menstrual problem, pelvic pain, vaginal discharge and vaginal pain.       /78   Ht 154.9 cm (61\")   Wt 100 kg (221 lb)   BMI 41.76 kg/m²     Physical Exam   Constitutional: She is oriented to person, place, and time. She appears well-developed and well-nourished. No distress.   Pulmonary/Chest: Left breast exhibits mass.   1-2cm " left breast mass at 6 oclock       Genitourinary: There is no rash, tenderness, lesion or injury on the right labia. There is no rash, tenderness, lesion or injury on the left labia. There is bleeding in the vagina. No erythema or tenderness in the vagina.   There is a foreign body in the vagina. No signs of injury around the vagina. No vaginal discharge found.   Genitourinary Comments: IUD string visualized   Musculoskeletal: Normal range of motion.   Neurological: She is alert and oriented to person, place, and time. No cranial nerve deficit. Coordination normal.   Skin: She is not diaphoretic.   Psychiatric: She has a normal mood and affect. Her behavior is normal. Judgment and thought content normal.   Vitals reviewed.        Assessment/Plan   Theresa was seen today for colposcopy, follow-up and breast problem.    Diagnoses and all orders for this visit:    LGSIL on Pap smear of cervix  -     Colposcopy  -     Reference Histopathology    Screening for condition  -     POC Pregnancy, Urine    Left breast mass  -     US Breast Left Complete; Future    IUD check up    29-year-old for IUD check, cervical dysplasia and complaints of left breast mass.    1) IUD checkup: Kyleena IUD placed 1/24/2020.  IUD strings noted to be in place.  Patient without complaints.  Patient reassured that the Kyleena IUD has not been shown to cause breast cancer.    2) cervical dysplasia: Patient diagnosed with a low-grade squamous intraepithelial lesion on her Pap smear.  Colposcopy performed with 1 biopsy.  Patient tolerated well.  See procedure note.    3) left breast mass.  Small 1 to 2 cm area of irregular tissue noted.  May be normal tissue but will proceed with a left breast ultrasound to fully evaluate.    4) Social: Pt is friends with Deborah Harris      No follow-ups on file.      Estella Soria DO    2/28/2020  10:12 AM

## 2020-03-02 LAB
DX ICD CODE: NORMAL
PATH REPORT.FINAL DX SPEC: NORMAL
PATH REPORT.GROSS SPEC: NORMAL
PATH REPORT.SITE OF ORIGIN SPEC: NORMAL
PATHOLOGIST NAME: NORMAL
PAYMENT PROCEDURE: NORMAL

## 2020-03-03 ENCOUNTER — HOSPITAL ENCOUNTER (OUTPATIENT)
Dept: ULTRASOUND IMAGING | Facility: HOSPITAL | Age: 30
Discharge: HOME | End: 2020-03-03
Admitting: OBSTETRICS & GYNECOLOGY

## 2020-03-03 DIAGNOSIS — N63.20 LEFT BREAST MASS: ICD-10-CM

## 2020-03-03 PROCEDURE — 76642 ULTRASOUND BREAST LIMITED: CPT

## 2020-03-10 NOTE — PROGRESS NOTES
GYN Annual Exam     CC- Here for annual exam.     Theresa Dyson is a 29 y.o. female who presents for annual well woman exam. Pt has not been seen in over 3 years and is considered a new pt. Pt has a Nexplanon in place for 3 years. She reports weight gain on Nexplanon. She desires removal and placement of IUD today. Sexually active.  Patient is engaged to be .  Patient does not desire any children at this time.    OB History    None         Current contraception: Nexplanon  History of abnormal Pap smear: yes - had colposcopy only  History of abnormal mammogram: no  Family history of uterine, colon or ovarian cancer: no  Family history of breast cancer: no    Health Maintenance   Topic Date Due   • ANNUAL PHYSICAL  1995   • TDAP/TD VACCINES (1 - Tdap) 2003   • PAP SMEAR  2019   • INFLUENZA VACCINE  2019   • Annual Gynecologic Pelvic and Breast Exam  2021       Past Medical History:   Diagnosis Date   • Allergic rhinitis    • Anxiety    • Carpal tunnel syndrome, right upper limb    • Chronic migraine without aura, not intractable    • GERD without esophagitis    • Insomnia    • Panic disorder without agoraphobia        Past Surgical History:   Procedure Laterality Date   • ADENOIDECTOMY     •  SECTION     • CHOLECYSTECTOMY     • MIDDLE EAR SURGERY     • MYRINGOTOMY W/ TUBES     • TONSILLECTOMY           Current Outpatient Medications:   •  citalopram (CeleXA) 20 MG tablet, Take 20 mg by mouth Daily., Disp: , Rfl:   •  ondansetron ODT (ZOFRAN-ODT) 8 MG disintegrating tablet, One tablet po q 6 hours PRN nausea and vomiting, Disp: 12 tablet, Rfl: 0    Allergies   Allergen Reactions   • Prednisone Hives       Social History     Tobacco Use   • Smoking status: Former Smoker     Packs/day: 0.00     Years: 0.50     Pack years: 0.00   • Tobacco comment: quit 3 months ago   Substance Use Topics   • Alcohol use: Yes     Frequency: 2-4 times a month     Drinks per session: 1 or 2      "Comment: caffeine use   • Drug use: No       Family History   Problem Relation Age of Onset   • Hypertension Mother    • Heart failure Father    • Heart disease Father    • Hypertension Father    • Diabetes Paternal Aunt    • Diabetes Paternal Uncle    • Stroke Maternal Grandmother    • Heart disease Paternal Grandfather    • Diabetes Paternal Uncle        Review of Systems   Constitutional: Positive for unexpected weight change. Negative for appetite change, chills, fatigue and fever.   Gastrointestinal: Negative for abdominal distention, abdominal pain, anal bleeding, blood in stool, constipation, diarrhea, nausea and vomiting.   Genitourinary: Negative for dyspareunia, dysuria, menstrual problem, pelvic pain, vaginal bleeding, vaginal discharge and vaginal pain.       /62   Ht 154.9 cm (60.98\")   Wt 103 kg (227 lb 9.6 oz)   LMP 12/09/2019 (Exact Date)   Breastfeeding No   BMI 43.03 kg/m²     Physical Exam   Constitutional: She is oriented to person, place, and time. She appears well-developed and well-nourished.   HENT:   Mouth/Throat: Normal dentition. No dental caries.   Cardiovascular: Normal rate, regular rhythm and normal heart sounds.   Pulmonary/Chest: Effort normal and breath sounds normal. No stridor. No respiratory distress. She has no wheezes. Right breast exhibits no inverted nipple, no mass, no nipple discharge, no skin change and no tenderness. Left breast exhibits no inverted nipple, no mass, no nipple discharge, no skin change and no tenderness.   Abdominal: Soft. She exhibits no distension and no mass. There is no tenderness.   Genitourinary: There is no rash, tenderness or lesion on the right labia. There is no rash, tenderness or lesion on the left labia. Uterus is not deviated, not enlarged, not fixed and not tender. Cervix exhibits no motion tenderness, no discharge and no friability. Right adnexum displays no mass, no tenderness and no fullness. Left adnexum displays no mass, no " Physically abused: Not on file     Forced sexual activity: Not on file   Other Topics Concern    Not on file   Social History Narrative    Not on file       GynecologicHistory  No LMP recorded. Last Pap: 2019 Results: normal  Last Mammogram: Not Indicated Results: N/A  no fmhx cancer  OB History        2    Para        Term                AB   2    Living   0       SAB        TAB        Ectopic        Molar        Multiple        Live Births                  Patient's medications, allergies, past medical, surgical, social and family histories were reviewed and updated as appropriate. Review of Systems  Review of Systems   Constitutional: Negative for activity change, appetite change, fatigue and unexpected weight change. HENT: Negative for nosebleeds and sore throat. Eyes: Negative for visual disturbance. Respiratory: Negative for cough, shortness of breath and wheezing. Cardiovascular: Negative for chest pain, palpitations and leg swelling. Gastrointestinal: Negative for abdominal distention, abdominal pain, blood in stool, constipation, diarrhea, nausea and vomiting. Endocrine: Negative for cold intolerance, heat intolerance, polydipsia and polyuria. Genitourinary: Negative for difficulty urinating, dyspareunia, dysuria, frequency, genital sores, hematuria, menstrual problem, pelvic pain, urgency, vaginal bleeding, vaginal discharge and vaginal pain. Musculoskeletal: Negative for arthralgias. Skin: Negative for rash. Neurological: Negative for dizziness, weakness, light-headedness and headaches. Hematological: Negative for adenopathy. Does not bruise/bleed easily. Psychiatric/Behavioral: Negative for confusion and sleep disturbance. Objective:     Vitals:  /72   Ht 5' 6\" (1.676 m)   Wt 211 lb (95.7 kg)   BMI 34.06 kg/m²     Physical Exam  Constitutional:       General: She is not in acute distress. Appearance: She is well-developed.  She is not diaphoretic. HENT:      Head: Normocephalic. Nose: Nose normal.   Eyes:      Conjunctiva/sclera: Conjunctivae normal.      Pupils: Pupils are equal, round, and reactive to light. Neck:      Thyroid: No thyromegaly. Trachea: No tracheal deviation. Cardiovascular:      Rate and Rhythm: Normal rate and regular rhythm. Heart sounds: Normal heart sounds. No murmur. No friction rub. No gallop. Pulmonary:      Effort: Pulmonary effort is normal. No respiratory distress. Breath sounds: Normal breath sounds. No wheezing or rales. Chest:      Chest wall: No tenderness. Breasts:         Right: No inverted nipple, mass, nipple discharge, skin change or tenderness. Left: No inverted nipple, mass, nipple discharge, skin change or tenderness. Abdominal:      General: Bowel sounds are normal. There is no distension. Palpations: Abdomen is soft. There is no mass. Tenderness: There is no abdominal tenderness. There is no guarding or rebound. Genitourinary:     Labia:         Right: No rash, tenderness or lesion. Left: No rash, tenderness or lesion. Vagina: Normal. No vaginal discharge, erythema, tenderness or bleeding. Cervix: No cervical motion tenderness, discharge or friability. Uterus: Not deviated, not enlarged, not fixed and not tender. Adnexa:         Right: No mass, tenderness or fullness. Left: No mass, tenderness or fullness. Comments: Uterus is not prolapsed and there is not a cystocele or rectocele noted  Lymphadenopathy:      Upper Body:      Right upper body: No supraclavicular or axillary adenopathy. Left upper body: No supraclavicular or axillary adenopathy. Skin:     General: Skin is warm and dry. Neurological:      Mental Status: She is alert and oriented to person, place, and time. Cranial Nerves: No cranial nerve deficit.       Deep Tendon Reflexes: Reflexes normal.   Psychiatric:         Behavior: tenderness and no fullness. No tenderness or bleeding in the vagina. No vaginal discharge found.   Musculoskeletal: Normal range of motion. She exhibits no edema or tenderness.   Neurological: She is alert and oriented to person, place, and time. No cranial nerve deficit. Coordination normal.   Skin: Skin is warm. No rash noted. No erythema.   Psychiatric: She has a normal mood and affect. Her behavior is normal. Judgment and thought content normal.   Vitals reviewed.         Assessment/Plan    1) GYN HM: Check pap smear. SBE demonstrated and encouraged.  2) STD screening: Check full panel per pt request  3) Contraception: has Nexplanon in place- removed today. Kyleena IUD placed without difficulty.  Patient tolerated procedure well.  See procedure note.  4) Family Planning: Patient is engaged to be .  She does not desire children at this time.  5) Diet and Exercise discussed.  Patient reports that she is gained quite a bit of weight since the Nexplanon was placed.  Patient is hoping that by remove the Nexplanon that she will begin to lose weight.  6) Smoking Status: nonsmoker- quit 2 years ago.  7) Social: Patient is in a monogamous relationship and is engaged to be .  8) Follow up prn and 1 year       Diagnoses and all orders for this visit:    Routine gynecological examination  -     Cancel: Pap IG, Rfx HPV ASCU  -     Pap IG, Ct-Ng TV Rfx HPV ASCU    Screening for condition  -     POC Urinalysis Dipstick  -     POC Pregnancy, Urine  -     Cancel: Chlamydia trachomatis, Neisseria gonorrhoeae, Trichomonas vaginalis, PCR - Urine, Urine, Random Void  -     HIV-1 / O / 2 Ag / Antibody 4th Generation  -     RPR, Rfx Qn RPR / Confirm TP  -     HSV 1 & 2 - Specific Antibody, IgG  -     Hepatitis C Antibody  -     Hepatitis B Surface Antigen    Pap smear, low-risk  -     Cancel: Pap IG, Rfx HPV ASCU  -     Pap IG, Ct-Ng TV Rfx HPV ASCU    Encounter for IUD insertion  -     levonorgestrel (KYLEENA) 19.5 MG  Behavior normal.         Judgment: Judgment normal.         Assessment:      Diagnosis Orders   1. Women's annual routine gynecological examination     2. Screening for HPV (human papillomavirus)  PAP SMEAR   3. Routine screening for STI (sexually transmitted infection)  PAP SMEAR    Wet prep, genital    C.trachomatis N.gonorrhoeae DNA    Herpes Simplex Virus (HSV) I Glycoprotein Antibody IgG    Herpes Simplex Virus (HSV) II Glycoprotein Antibody IgG    HIV-1,2 Combo Ag/Ab By NATHANIEL, Reflexive Panel    RPR Reflex to Titer and TPPA       Body mass index is 34.06 kg/m². Obesity:  Normal weight        Plan:   Pap smear : indicated:  performed. Breast exam :Normal  STD work up : As appropriat      Obesity Counseling:  N/A  Smoking Counseling:  N/A  STD counseling: Will call pt with results    Orders Placed This Encounter   Procedures    Wet prep, genital     Standing Status:   Future     Standing Expiration Date:   3/9/2021    C.trachomatis N.gonorrhoeae DNA     Standing Status:   Future     Standing Expiration Date:   3/9/2021     Order Specific Question:   Source: Answer:   Vaginal    PAP SMEAR     Standing Status:   Future     Standing Expiration Date:   3/9/2021     Order Specific Question:   Collection Type     Answer: Thin Prep     Order Specific Question:   Prior Abnormal Pap Test     Answer:   No     Order Specific Question:   Screening or Diagnostic     Answer:   Screening     Order Specific Question:   HPV Requested?      Answer:   Yes     Comments:   16/18     Order Specific Question:   High Risk Patient     Answer:   N/A    Herpes Simplex Virus (HSV) I Glycoprotein Antibody IgG     Standing Status:   Future     Standing Expiration Date:   3/9/2021    Herpes Simplex Virus (HSV) II Glycoprotein Antibody IgG     Standing Status:   Future     Standing Expiration Date:   3/9/2021    HIV-1,2 Combo Ag/Ab By NATHANIEL, Reflexive Panel     Standing Status:   Future     Standing Expiration Date:   3/9/2021    RPR IUD 1 each    Nexplanon removal          Estella Soria DO  1/28/2020  2:43 PM

## 2020-03-20 ENCOUNTER — TELEPHONE (OUTPATIENT)
Dept: CARDIOLOGY | Facility: CLINIC | Age: 30
End: 2020-03-20

## 2020-03-20 RX ORDER — METOPROLOL SUCCINATE 25 MG/1
25 TABLET, EXTENDED RELEASE ORAL DAILY
Qty: 30 TABLET | Refills: 11 | Status: SHIPPED | OUTPATIENT
Start: 2020-03-20 | End: 2020-06-16

## 2020-03-20 NOTE — TELEPHONE ENCOUNTER
Pt left msg saying she has been having more frequent episodes of SVT for the last week.  She wants a beta blocker called in-she doesn't want to come to the office due to COVID 19.    Pt # [phone]    Thanks,  Cecelia

## 2020-03-20 NOTE — TELEPHONE ENCOUNTER
I called in Toprol 25 mg to her pharmacy.  Told her start with 12.5 call me back next week if not better.  If she does not tolerate it we will consider Holter monitor.  Ultimately she will need a follow-up however I would defer this until further notice depending upon how she does clinically.

## 2020-03-20 NOTE — TELEPHONE ENCOUNTER
Pt left msg saying she went to the pharmacy to  what you called in and was told she cannot cut it in half or quarters because it's extended release.   Please advise.    Pt # [phone]

## 2020-06-16 ENCOUNTER — OFFICE VISIT (OUTPATIENT)
Dept: OBSTETRICS AND GYNECOLOGY | Facility: CLINIC | Age: 30
End: 2020-06-16

## 2020-06-16 VITALS
HEIGHT: 61 IN | WEIGHT: 221.3 LBS | DIASTOLIC BLOOD PRESSURE: 80 MMHG | BODY MASS INDEX: 41.78 KG/M2 | SYSTOLIC BLOOD PRESSURE: 116 MMHG

## 2020-06-16 DIAGNOSIS — Z13.9 SCREENING FOR CONDITION: Primary | ICD-10-CM

## 2020-06-16 DIAGNOSIS — E66.01 MORBID OBESITY (HCC): ICD-10-CM

## 2020-06-16 PROBLEM — I47.10 PAROXYSMAL SVT (SUPRAVENTRICULAR TACHYCARDIA): Status: RESOLVED | Noted: 2019-04-12 | Resolved: 2020-06-16

## 2020-06-16 PROBLEM — I47.1 PAROXYSMAL SVT (SUPRAVENTRICULAR TACHYCARDIA): Status: RESOLVED | Noted: 2019-04-12 | Resolved: 2020-06-16

## 2020-06-16 LAB
B-HCG UR QL: NEGATIVE
BILIRUB BLD-MCNC: NEGATIVE MG/DL
CLARITY, POC: CLEAR
COLOR UR: YELLOW
GLUCOSE UR STRIP-MCNC: NEGATIVE MG/DL
INTERNAL NEGATIVE CONTROL: NEGATIVE
INTERNAL POSITIVE CONTROL: POSITIVE
KETONES UR QL: ABNORMAL
LEUKOCYTE EST, POC: NEGATIVE
Lab: 55
NITRITE UR-MCNC: NEGATIVE MG/ML
PH UR: 6 [PH] (ref 5–8)
PROT UR STRIP-MCNC: NEGATIVE MG/DL
RBC # UR STRIP: NEGATIVE /UL
SP GR UR: 1 (ref 1–1.03)
UROBILINOGEN UR QL: NORMAL

## 2020-06-16 PROCEDURE — WTLOSSINITIAL: Performed by: OBSTETRICS & GYNECOLOGY

## 2020-06-16 RX ORDER — PHENTERMINE HYDROCHLORIDE 37.5 MG/1
37.5 TABLET ORAL
Qty: 30 TABLET | Refills: 0 | Status: SHIPPED | OUTPATIENT
Start: 2020-06-16 | End: 2020-08-31

## 2020-06-16 NOTE — PROGRESS NOTES
"Patient Care Team:  Heather Leon MD as PCP - General (Internal Medicine & Pediatrics)    Patient new to practice? No  Patient new to me? No    Chief Complaint:   Chief Complaint   Patient presents with   • Weight Loss     wanting to try weight loss meds,        HPI: History taken from: patient            No LMP recorded (lmp unknown). (Menstrual status: Other).           Pt denies any problems or side effects.    Past Medical History:   Diagnosis Date   • Allergic rhinitis    • Anxiety    • Carpal tunnel syndrome, right upper limb    • Chronic migraine without aura, not intractable    • GERD without esophagitis    • Insomnia    • Panic disorder without agoraphobia        Review of Systems   Constitutional: Negative.    HENT: Negative.    Eyes: Negative.    Respiratory: Negative.    Cardiovascular: Negative.    Gastrointestinal: Negative.    Endocrine: Negative.    Musculoskeletal: Negative.    Skin: Negative.    Allergic/Immunologic: Negative.    Neurological: Negative.    Hematological: Negative.    Psychiatric/Behavioral: Negative.          Current Outpatient Medications:   •  citalopram (CeleXA) 20 MG tablet, Take 20 mg by mouth Daily., Disp: , Rfl:   •  Levonorgestrel (KYLEENA) 19.5 MG intrauterine device IUD, 1 each by Intrauterine route 1 (One) Time., Disp: , Rfl:   •  phentermine (ADIPEX-P) 37.5 MG tablet, Take 1 tablet by mouth Every Morning Before Breakfast., Disp: 30 tablet, Rfl: 0    Social History:  Smoker: No                                Alcohol: No                               Recreational drugs: No    Objective    Vital Signs  BP: (116)/(80) 116/80    Flowsheet Rows      First Filed Value   Admission Height  154.9 cm (61\") Documented at 06/16/2020 1134   Admission Weight  100 kg (221 lb 4.8 oz) Documented at 06/16/2020 1134          Physical Exam: /80   Ht 154.9 cm (61\")   Wt 100 kg (221 lb 4.8 oz)   LMP  (LMP Unknown) Comment: IUD  Breastfeeding No   BMI 41.81 kg/m² "     Physical Exam   Constitutional: She is oriented to person, place, and time. She appears well-developed and well-nourished.   HENT:   Head: Normocephalic and atraumatic.   Eyes: EOM are normal.   Neck: Normal range of motion.   Pulmonary/Chest: Effort normal.   Abdominal: Soft.   Musculoskeletal: Normal range of motion.   Neurological: She is alert and oriented to person, place, and time.   Skin: Skin is warm and dry.   Psychiatric: She has a normal mood and affect. Her behavior is normal. Judgment and thought content normal.   Nursing note and vitals reviewed.          Lab Results (last 24 hours)     Procedure Component Value Units Date/Time    POC Pregnancy, Urine [[id number]]  (Normal) Collected:  06/16/20 1144    Specimen:  Urine Updated:  06/16/20 1145     HCG, Urine, QL Negative     Lot Number 55     Internal Positive Control Positive     Internal Negative Control Negative    POC Urinalysis Dipstick [[id number]]  (Abnormal) Collected:  06/16/20 1143    Specimen:  Urine Updated:  06/16/20 1144     Color Yellow     Clarity, UA Clear     Glucose, UA Negative mg/dL      Bilirubin Negative     Ketones, UA Trace     Specific Gravity  1.005     Blood, UA Negative     pH, Urine 6.0     Protein, POC Negative mg/dL      Urobilinogen, UA Normal     Leukocytes Negative     Nitrite, UA Negative          Imaging Results (Last 24 Hours)     ** No results found for the last 24 hours. **            ECG/EMG Results (most recent)     None          Medication Review:   I have reviewed the patient's current medication list    Current Outpatient Medications:   •  citalopram (CeleXA) 20 MG tablet, Take 20 mg by mouth Daily., Disp: , Rfl:   •  Levonorgestrel (KYLEENA) 19.5 MG intrauterine device IUD, 1 each by Intrauterine route 1 (One) Time., Disp: , Rfl:   •  phentermine (ADIPEX-P) 37.5 MG tablet, Take 1 tablet by mouth Every Morning Before Breakfast., Disp: 30 tablet, Rfl: 0    IMPRESSION: INCREASED BMI ON WT LOSS PROGRAM  INCLUDING PHENTERMINE    Plan for disposition:  REFILL PHENTERMINE, RTO 1 MONTH.  INSTRUCTIONS, PRECAUTIONS AND WARNINGS REVIEWED.        Michael Gold MD    06/16/20  11:56

## 2020-06-18 ENCOUNTER — OFFICE VISIT (OUTPATIENT)
Dept: OBSTETRICS AND GYNECOLOGY | Facility: CLINIC | Age: 30
End: 2020-06-18

## 2020-06-18 ENCOUNTER — TELEPHONE (OUTPATIENT)
Dept: OBSTETRICS AND GYNECOLOGY | Facility: CLINIC | Age: 30
End: 2020-06-18

## 2020-06-18 ENCOUNTER — TELEPHONE (OUTPATIENT)
Dept: SURGERY | Facility: CLINIC | Age: 30
End: 2020-06-18

## 2020-06-18 VITALS
DIASTOLIC BLOOD PRESSURE: 78 MMHG | HEIGHT: 61 IN | SYSTOLIC BLOOD PRESSURE: 110 MMHG | WEIGHT: 219.4 LBS | BODY MASS INDEX: 41.42 KG/M2

## 2020-06-18 DIAGNOSIS — N63.20 LEFT BREAST MASS: ICD-10-CM

## 2020-06-18 DIAGNOSIS — Z13.9 SCREENING FOR CONDITION: Primary | ICD-10-CM

## 2020-06-18 PROBLEM — N87.9 CERVICAL DYSPLASIA: Status: ACTIVE | Noted: 2020-06-18

## 2020-06-18 PROCEDURE — 81025 URINE PREGNANCY TEST: CPT | Performed by: OBSTETRICS & GYNECOLOGY

## 2020-06-18 PROCEDURE — 99214 OFFICE O/P EST MOD 30 MIN: CPT | Performed by: OBSTETRICS & GYNECOLOGY

## 2020-06-18 NOTE — TELEPHONE ENCOUNTER
Is 1 to let you know that I put in a referral for this patient to see Dr. Zapien due to a left breast mass.

## 2020-06-18 NOTE — PROGRESS NOTES
"PROBLEM VISIT    Chief Complaint: left breast mass      Theersa Dyson is a 30 y.o. patient who presents for follow up of left breast mass noted by pt and me at 6 oclock position. Pt last seen in 2/2020. Normal breast US 3/3/20. Pt presents today for a repeat exam. Pt states she can still feel the mass. She has not noted any enlargement. NT.  Patient recently started on phentermine to aid in weight loss.  Chief Complaint   Patient presents with   • Follow-up     breast             The following portions of the patient's history were reviewed and updated as appropriate: allergies, current medications and problem list.    Review of Systems   Constitutional: Negative for appetite change, chills, fatigue, fever and unexpected weight change.   Gastrointestinal: Negative for abdominal distention, abdominal pain, anal bleeding, blood in stool, constipation, diarrhea, nausea and vomiting.   Genitourinary: Negative for dyspareunia, dysuria, menstrual problem, pelvic pain, vaginal bleeding, vaginal discharge and vaginal pain.       /78   Ht 154.9 cm (60.98\")   Wt 99.5 kg (219 lb 6.4 oz)   LMP  (LMP Unknown) Comment: IUD  Breastfeeding No   BMI 41.48 kg/m²     Physical Exam   Constitutional: She is oriented to person, place, and time. She appears well-developed and well-nourished. No distress.   Pulmonary/Chest: Left breast exhibits mass and tenderness. Left breast exhibits no inverted nipple, no nipple discharge and no skin change.   1-2cm left breast mass persists.       Neurological: She is alert and oriented to person, place, and time. She displays normal reflexes. No cranial nerve deficit. Coordination normal.   Skin: She is not diaphoretic.   Psychiatric: She has a normal mood and affect. Her behavior is normal. Judgment and thought content normal.   Vitals reviewed.        Assessment/Plan   Theresa was seen today for follow-up.    Diagnoses and all orders for this visit:    Screening for condition  -     POC " Urinalysis Dipstick  -     POC Pregnancy, Urine    Left breast mass    30-year-old for follow-up of left breast mass    1) left breast mass: Patient noted a left breast mass at the 6:00 region.  Patient had an ultrasound done of the left breast that returned is negative.  Patient follows up today for repeat exam.  Mass persists at the 6:00 region of the left breast.  The mass does not seem to have enlarged compared to the exam in February and is still measuring 1 to 2 cm.  Will have patient see Dr. Zapien to evaluate to see if biopsy is indicated.    2) cervical dysplasia: Patient had a low-grade squamous intraepithelial Pap smear.  Colposcopic biopsy revealed DEBO-1.  Patient is due for a repeat Pap smear in 2 months.  Patient is aware appointment has been made.    3) weight gain: Patient has been gaining weight and is recently been started on phentermine to aid in weight loss.           No follow-ups on file.      Estella Soria DO    6/18/2020  09:09

## 2020-07-28 ENCOUNTER — TELEPHONE (OUTPATIENT)
Dept: INTERNAL MEDICINE | Facility: CLINIC | Age: 30
End: 2020-07-28

## 2020-07-28 NOTE — TELEPHONE ENCOUNTER
PT CALLED STATING DR. LEAVITT HAD TOLD HER SHE WOULD SEE THE WHOLE FAMILY, BUT SHE IS NOT CURRENTLY ACCEPTING NEW PATIENTS.  PT WONDERS IF SHE CAN BE WORKED IN TO THE SCHEDULE, AND IF SO, PLEASE CALL TO SCHEDULE.  OTHERWISE, PT NEEDS A RECOMMENDATION FOR WHERE TO GO FOR A RT WRIST INJURY THAT HAS NOT IMPROVED WITH SPLINTING.    PLEASE ADVISE.    CALLBACK NUMBER: [phone]

## 2020-08-03 ENCOUNTER — OFFICE VISIT (OUTPATIENT)
Dept: INTERNAL MEDICINE | Facility: CLINIC | Age: 30
End: 2020-08-03

## 2020-08-03 VITALS
BODY MASS INDEX: 41.76 KG/M2 | WEIGHT: 221.2 LBS | DIASTOLIC BLOOD PRESSURE: 74 MMHG | RESPIRATION RATE: 16 BRPM | TEMPERATURE: 97.8 F | HEART RATE: 77 BPM | SYSTOLIC BLOOD PRESSURE: 122 MMHG | OXYGEN SATURATION: 99 % | HEIGHT: 61 IN

## 2020-08-03 DIAGNOSIS — Z00.00 HEALTHCARE MAINTENANCE: Primary | ICD-10-CM

## 2020-08-03 DIAGNOSIS — F41.9 ANXIETY: ICD-10-CM

## 2020-08-03 DIAGNOSIS — G44.029 CHRONIC CLUSTER HEADACHE, NOT INTRACTABLE: ICD-10-CM

## 2020-08-03 DIAGNOSIS — M65.4 TENOSYNOVITIS, DE QUERVAIN: ICD-10-CM

## 2020-08-03 PROCEDURE — 99204 OFFICE O/P NEW MOD 45 MIN: CPT | Performed by: INTERNAL MEDICINE

## 2020-08-03 RX ORDER — MELOXICAM 15 MG/1
15 TABLET ORAL DAILY
Qty: 30 TABLET | Refills: 0 | Status: SHIPPED | OUTPATIENT
Start: 2020-08-03 | End: 2020-10-06

## 2020-08-03 RX ORDER — CITALOPRAM 20 MG/1
20 TABLET ORAL DAILY
Qty: 90 TABLET | Refills: 3 | Status: SHIPPED | OUTPATIENT
Start: 2020-08-03 | End: 2021-05-03

## 2020-08-03 NOTE — PROGRESS NOTES
"      Theresa Dyson is a 30 y.o. female, who presents with a chief complaint of   Chief Complaint   Patient presents with   • Wrist Pain     Right wrist pain for the last 2 weeks, pt states that she injured it some years ago and she has carpal tunnel.    • Headache       HPI   Pt here to est care    Right wrist pain - pt has had issues with carpal tunnel in her wrist since she was a teenager.  Sx worse lately.  She has been wearing a brace and taking naproxen.  Sx slightly better.  She was a  in the past.  Sx worse with lots of writing    Headaches - pt w/ hx chronic headaches and saw a neurologist as a kid.  She sees ent for a \"weak middle ear\"  She has had multiple surgeries on her left ear.  She works as a computer daily.  Pt ordered blue blocker sunglasses and she thinks this might help a little.  She has had migraines in the past but this is not as severe.  The other day she thought she had a moment of blurry vision but it got better in a few seconds.  No other vision changes.  She has had an eye appt earlier this year.  She wears contacts or glasses.  No change in headaches depending on whether she is wearing glasses or contacts.  Ha mostly frontal.  She does not wake up w/ headache and it gets worse throughout the day.  When she does take a break from the computer she is usually on her phone. No night wakening.  No nausea.        she sees [facility] for gyn    The following portions of the patient's history were reviewed and updated as appropriate: allergies, current medications, past family history, past medical history, past social history, past surgical history and problem list.    Allergies: Prednisone    Review of Systems   Constitutional: Negative.    Eyes: Negative.    Respiratory: Negative.    Cardiovascular: Negative.    Gastrointestinal: Negative.    Endocrine: Negative.    Genitourinary: Negative.    Musculoskeletal:        Wrist pain   Skin: Negative.    Allergic/Immunologic: Negative.  "   Neurological: Positive for headaches.   Hematological: Negative.    Psychiatric/Behavioral: Negative.    All other systems reviewed and are negative.            Wt Readings from Last 3 Encounters:   08/03/20 100 kg (221 lb 3.2 oz)   06/18/20 99.5 kg (219 lb 6.4 oz)   06/16/20 100 kg (221 lb 4.8 oz)     Temp Readings from Last 3 Encounters:   08/03/20 97.8 °F (36.6 °C) (Oral)   04/23/18 98.7 °F (37.1 °C)   10/25/17 97.6 °F (36.4 °C) (Tympanic)     BP Readings from Last 3 Encounters:   08/03/20 122/74   06/18/20 110/78   06/16/20 116/80     Pulse Readings from Last 3 Encounters:   08/03/20 77   05/01/19 55   04/10/19 78     Body mass index is 41.82 kg/m².  @LASTSAO2(3)@    Physical Exam   Constitutional: She is oriented to person, place, and time. She appears well-developed and well-nourished. No distress.   HENT:   Head: Normocephalic and atraumatic.   Right Ear: External ear normal.   Left Ear: External ear normal.   Nose: Nose normal.   Mouth/Throat: Oropharynx is clear and moist.   Right tm normal  Left with anomalies c/w multiple surgeries.     Eyes: Pupils are equal, round, and reactive to light. Conjunctivae and EOM are normal.   Neck: Normal range of motion. Neck supple.   Cardiovascular: Normal rate, regular rhythm, normal heart sounds and intact distal pulses.   Pulmonary/Chest: Effort normal and breath sounds normal. No respiratory distress. She has no wheezes.   Musculoskeletal:   Normal gait  ttp over right thumb and positive deQuervain testing.   Neurological: She is alert and oriented to person, place, and time.   Skin: Skin is warm and dry.   Psychiatric: She has a normal mood and affect. Her behavior is normal. Judgment and thought content normal.   Nursing note and vitals reviewed.      Results for orders placed or performed in visit on 06/18/20   POC Urinalysis Dipstick   Result Value Ref Range    Color Yellow Yellow, Straw, Dark Yellow, Danna    Clarity, UA Clear Clear    Glucose, UA Negative  Negative, 1000 mg/dL (3+) mg/dL    Bilirubin Negative Negative    Ketones, UA Negative Negative    Specific Gravity  1.005 1.005 - 1.030    Blood, UA Negative Negative    pH, Urine 5.0 5.0 - 8.0    Protein, POC Negative Negative mg/dL    Urobilinogen, UA Normal Normal    Leukocytes Negative Negative    Nitrite, UA Negative Negative   POC Pregnancy, Urine   Result Value Ref Range    HCG, Urine, QL Negative Negative    Lot Number 55     Internal Positive Control Positive     Internal Negative Control Negative            Theresa was seen today for wrist pain and headache.    Diagnoses and all orders for this visit:    Healthcare maintenance  -     Comprehensive Metabolic Panel  -     CBC & Differential  -     T4, Free  -     TSH  -     Lipid Panel With LDL / HDL Ratio  -     Hemoglobin A1c    Tenosynovitis, de Quervain - get wrist brace with thumb spica.  If not improved in 4 weeks refer to hand surgery  -     meloxicam (MOBIC) 15 MG tablet; Take 1 tablet by mouth Daily.    Chronic cluster headache, not intractable - discussed limiting screen time and trying different screen covers.  If sx not improving check MRI and refer to neurology. Discussed red flag sx.   -     Comprehensive Metabolic Panel  -     CBC & Differential  -     T4, Free  -     TSH  -     Lipid Panel With LDL / HDL Ratio    Anxiety - cont celexa. Refill called in.  -     Comprehensive Metabolic Panel  -     CBC & Differential  -     T4, Free  -     TSH  -     Lipid Panel With LDL / HDL Ratio    BMI 40.0-44.9, adult ([facility])  -     Comprehensive Metabolic Panel  -     CBC & Differential  -     T4, Free  -     TSH  -     Lipid Panel With LDL / HDL Ratio  -     Hemoglobin A1c    45 minutes was spent in patient care with >50% in counseling about the above issues including medications and disease management plan.           Outpatient Medications Prior to Visit   Medication Sig Dispense Refill   • Levonorgestrel (KYLEENA) 19.5 MG intrauterine device IUD 1  each by Intrauterine route 1 (One) Time.     • phentermine (ADIPEX-P) 37.5 MG tablet Take 1 tablet by mouth Every Morning Before Breakfast. 30 tablet 0   • citalopram (CeleXA) 20 MG tablet Take 20 mg by mouth Daily.       No facility-administered medications prior to visit.      New Medications Ordered This Visit   Medications   • meloxicam (MOBIC) 15 MG tablet     Sig: Take 1 tablet by mouth Daily.     Dispense:  30 tablet     Refill:  0   • citalopram (CeleXA) 20 MG tablet     Sig: Take 1 tablet by mouth Daily.     Dispense:  90 tablet     Refill:  3     [unfilled]  Medications Discontinued During This Encounter   Medication Reason   • citalopram (CeleXA) 20 MG tablet Reorder         Return in about 4 weeks (around 8/31/2020) for Recheck.

## 2020-08-04 LAB
ALBUMIN SERPL-MCNC: 4.6 G/DL (ref 3.5–5.2)
ALBUMIN/GLOB SERPL: 2.2 G/DL
ALP SERPL-CCNC: 46 U/L (ref 39–117)
ALT SERPL-CCNC: 16 U/L (ref 1–33)
AST SERPL-CCNC: 14 U/L (ref 1–32)
BASOPHILS # BLD AUTO: 0.04 10*3/MM3 (ref 0–0.2)
BASOPHILS NFR BLD AUTO: 0.6 % (ref 0–1.5)
BILIRUB SERPL-MCNC: 0.3 MG/DL (ref 0–1.2)
BUN SERPL-MCNC: 10 MG/DL (ref 6–20)
BUN/CREAT SERPL: 15.2 (ref 7–25)
CALCIUM SERPL-MCNC: 9.6 MG/DL (ref 8.6–10.5)
CHLORIDE SERPL-SCNC: 104 MMOL/L (ref 98–107)
CHOLEST SERPL-MCNC: 139 MG/DL (ref 0–200)
CO2 SERPL-SCNC: 28.2 MMOL/L (ref 22–29)
CREAT SERPL-MCNC: 0.66 MG/DL (ref 0.57–1)
EOSINOPHIL # BLD AUTO: 0.11 10*3/MM3 (ref 0–0.4)
EOSINOPHIL NFR BLD AUTO: 1.6 % (ref 0.3–6.2)
ERYTHROCYTE [DISTWIDTH] IN BLOOD BY AUTOMATED COUNT: 11.8 % (ref 12.3–15.4)
GLOBULIN SER CALC-MCNC: 2.1 GM/DL
GLUCOSE SERPL-MCNC: 95 MG/DL (ref 65–99)
HBA1C MFR BLD: 5.28 % (ref 4.8–5.6)
HCT VFR BLD AUTO: 40.7 % (ref 34–46.6)
HDLC SERPL-MCNC: 45 MG/DL (ref 40–60)
HGB BLD-MCNC: 13.3 G/DL (ref 12–15.9)
IMM GRANULOCYTES # BLD AUTO: 0.01 10*3/MM3 (ref 0–0.05)
IMM GRANULOCYTES NFR BLD AUTO: 0.1 % (ref 0–0.5)
LDLC SERPL CALC-MCNC: 81 MG/DL (ref 0–100)
LDLC/HDLC SERPL: 1.8 {RATIO}
LYMPHOCYTES # BLD AUTO: 1.71 10*3/MM3 (ref 0.7–3.1)
LYMPHOCYTES NFR BLD AUTO: 24.2 % (ref 19.6–45.3)
MCH RBC QN AUTO: 28.4 PG (ref 26.6–33)
MCHC RBC AUTO-ENTMCNC: 32.7 G/DL (ref 31.5–35.7)
MCV RBC AUTO: 87 FL (ref 79–97)
MONOCYTES # BLD AUTO: 0.5 10*3/MM3 (ref 0.1–0.9)
MONOCYTES NFR BLD AUTO: 7.1 % (ref 5–12)
NEUTROPHILS # BLD AUTO: 4.69 10*3/MM3 (ref 1.7–7)
NEUTROPHILS NFR BLD AUTO: 66.4 % (ref 42.7–76)
NRBC BLD AUTO-RTO: 0 /100 WBC (ref 0–0.2)
PLATELET # BLD AUTO: 262 10*3/MM3 (ref 140–450)
POTASSIUM SERPL-SCNC: 4.2 MMOL/L (ref 3.5–5.2)
PROT SERPL-MCNC: 6.7 G/DL (ref 6–8.5)
RBC # BLD AUTO: 4.68 10*6/MM3 (ref 3.77–5.28)
SODIUM SERPL-SCNC: 138 MMOL/L (ref 136–145)
T4 FREE SERPL-MCNC: 1.42 NG/DL (ref 0.93–1.7)
TRIGL SERPL-MCNC: 65 MG/DL (ref 0–150)
TSH SERPL DL<=0.005 MIU/L-ACNC: 1.55 UIU/ML (ref 0.27–4.2)
VLDLC SERPL CALC-MCNC: 13 MG/DL (ref 5–40)
WBC # BLD AUTO: 7.06 10*3/MM3 (ref 3.4–10.8)

## 2020-08-31 ENCOUNTER — TELEPHONE (OUTPATIENT)
Dept: INTERNAL MEDICINE | Facility: CLINIC | Age: 30
End: 2020-08-31

## 2020-08-31 ENCOUNTER — TELEMEDICINE (OUTPATIENT)
Dept: INTERNAL MEDICINE | Facility: CLINIC | Age: 30
End: 2020-08-31

## 2020-08-31 DIAGNOSIS — M65.4 TENOSYNOVITIS, DE QUERVAIN: ICD-10-CM

## 2020-08-31 DIAGNOSIS — R11.0 NAUSEA: ICD-10-CM

## 2020-08-31 DIAGNOSIS — G44.029 CHRONIC CLUSTER HEADACHE, NOT INTRACTABLE: Primary | ICD-10-CM

## 2020-08-31 PROCEDURE — 99214 OFFICE O/P EST MOD 30 MIN: CPT | Performed by: INTERNAL MEDICINE

## 2020-08-31 RX ORDER — PROPRANOLOL HYDROCHLORIDE 80 MG/1
80 CAPSULE, EXTENDED RELEASE ORAL DAILY
Qty: 30 CAPSULE | Refills: 0 | Status: SHIPPED | OUTPATIENT
Start: 2020-08-31 | End: 2021-05-03

## 2020-08-31 RX ORDER — DIAZEPAM 10 MG/1
TABLET ORAL
Qty: 2 TABLET | Refills: 0 | Status: SHIPPED | OUTPATIENT
Start: 2020-08-31 | End: 2020-10-06

## 2020-08-31 RX ORDER — ONDANSETRON 8 MG/1
8 TABLET, ORALLY DISINTEGRATING ORAL EVERY 12 HOURS PRN
Qty: 20 TABLET | Refills: 0 | Status: SHIPPED | OUTPATIENT
Start: 2020-08-31 | End: 2020-12-02

## 2020-08-31 RX ORDER — SUMATRIPTAN 100 MG/1
TABLET, FILM COATED ORAL
Qty: 9 TABLET | Refills: 1 | Status: SHIPPED | OUTPATIENT
Start: 2020-08-31 | End: 2020-09-29

## 2020-08-31 NOTE — TELEPHONE ENCOUNTER
Patient contacted and scheduled for 4 week [employer] [facility] [facility] visit.    ----- Message from Heather Leon MD sent at 8/31/2020 11:15 AM EDT -----  Regarding: apt  Please call pt to set up 4 week f/u.  Ok for video or in person.

## 2020-08-31 NOTE — PROGRESS NOTES
Theresa Dyson is a 30 y.o. female, who presents with a chief complaint of   Chief Complaint   Patient presents with   • Headache   • Wrist Pain       HPI   This visit has been scheduled as a telehealth visit to comply with patient safety concerns in accordance with CDC recommendations. This was an audio and video enabled telemedicine encounter.    You have chosen to receive care through a televisit visit. Do you consent to use a televisit visit for your medical care today? Yes    Pt here for follow up.    Headaches - pt still having intermittent headaches even though she has taken a few days off work.  Pt has had headaches more often and with more intensity lately.  + occ nausea.  She has one today and was in bed yesterday.   Ha mostly frontal.  She does not wake up w/ headache and it gets worse throughout the day. No vision changes.  No weakness, numbness, tingling.      She has work her wrist brace some.  Wrist pain much better.      Doing well with citalopram.       The following portions of the patient's history were reviewed and updated as appropriate: allergies, current medications, past family history, past medical history, past social history, past surgical history and problem list.    Allergies: Prednisone    Review of Systems   Constitutional: Negative.    Eyes: Negative.    Respiratory: Negative.    Cardiovascular: Negative.    Gastrointestinal: Positive for nausea.   Endocrine: Negative.    Genitourinary: Negative.    Musculoskeletal: Negative.    Skin: Negative.    Allergic/Immunologic: Negative.    Neurological: Positive for headaches.   Hematological: Negative.    Psychiatric/Behavioral: Negative.    All other systems reviewed and are negative.            Wt Readings from Last 3 Encounters:   08/03/20 100 kg (221 lb 3.2 oz)   06/18/20 99.5 kg (219 lb 6.4 oz)   06/16/20 100 kg (221 lb 4.8 oz)     Temp Readings from Last 3 Encounters:   08/03/20 97.8 °F (36.6 °C) (Oral)   04/23/18 98.7 °F (37.1  °C)   10/25/17 97.6 °F (36.4 °C) (Tympanic)     BP Readings from Last 3 Encounters:   08/03/20 122/74   06/18/20 110/78   06/16/20 116/80     Pulse Readings from Last 3 Encounters:   08/03/20 77   05/01/19 55   04/10/19 78     There is no height or weight on file to calculate BMI.  @LASTSAO2(3)@    Physical Exam   Constitutional: She is oriented to person, place, and time. She appears well-developed and well-nourished.   HENT:   Head: Normocephalic and atraumatic.   Nose: Nose normal.   Mouth/Throat: Oropharynx is clear and moist.   Eyes: Conjunctivae and EOM are normal. Right eye exhibits no discharge. Left eye exhibits no discharge.   Neck: Normal range of motion. Neck supple.   Pulmonary/Chest: Effort normal. No respiratory distress.   Musculoskeletal: She exhibits no edema.   Neurological: She is alert and oriented to person, place, and time.   Skin: No rash noted.   Psychiatric: She has a normal mood and affect. Her behavior is normal. Judgment and thought content normal.   Nursing note and vitals reviewed.      Results for orders placed or performed in visit on 08/03/20   Comprehensive Metabolic Panel   Result Value Ref Range    Glucose 95 65 - 99 mg/dL    BUN 10 6 - 20 mg/dL    Creatinine 0.66 0.57 - 1.00 mg/dL    eGFR Non African Am 107 >60 mL/min/1.73    eGFR African Am 129 >60 mL/min/1.73    BUN/Creatinine Ratio 15.2 7.0 - 25.0    Sodium 138 136 - 145 mmol/L    Potassium 4.2 3.5 - 5.2 mmol/L    Chloride 104 98 - 107 mmol/L    Total CO2 28.2 22.0 - 29.0 mmol/L    Calcium 9.6 8.6 - 10.5 mg/dL    Total Protein 6.7 6.0 - 8.5 g/dL    Albumin 4.60 3.50 - 5.20 g/dL    Globulin 2.1 gm/dL    A/G Ratio 2.2 g/dL    Total Bilirubin 0.3 0.0 - 1.2 mg/dL    Alkaline Phosphatase 46 39 - 117 U/L    AST (SGOT) 14 1 - 32 U/L    ALT (SGPT) 16 1 - 33 U/L   T4, Free   Result Value Ref Range    Free T4 1.42 0.93 - 1.70 ng/dL   TSH   Result Value Ref Range    TSH 1.550 0.270 - 4.200 uIU/mL   Lipid Panel With LDL / HDL Ratio    Result Value Ref Range    Total Cholesterol 139 0 - 200 mg/dL    Triglycerides 65 0 - 150 mg/dL    HDL Cholesterol 45 40 - 60 mg/dL    VLDL Cholesterol 13 5 - 40 mg/dL    LDL Cholesterol  81 0 - 100 mg/dL    LDL/HDL Ratio 1.80    Hemoglobin A1c   Result Value Ref Range    Hemoglobin A1C 5.28 4.80 - 5.60 %   CBC & Differential   Result Value Ref Range    WBC 7.06 3.40 - 10.80 10*3/mm3    RBC 4.68 3.77 - 5.28 10*6/mm3    Hemoglobin 13.3 12.0 - 15.9 g/dL    Hematocrit 40.7 34.0 - 46.6 %    MCV 87.0 79.0 - 97.0 fL    MCH 28.4 26.6 - 33.0 pg    MCHC 32.7 31.5 - 35.7 g/dL    RDW 11.8 (L) 12.3 - 15.4 %    Platelets 262 140 - 450 10*3/mm3    Neutrophil Rel % 66.4 42.7 - 76.0 %    Lymphocyte Rel % 24.2 19.6 - 45.3 %    Monocyte Rel % 7.1 5.0 - 12.0 %    Eosinophil Rel % 1.6 0.3 - 6.2 %    Basophil Rel % 0.6 0.0 - 1.5 %    Neutrophils Absolute 4.69 1.70 - 7.00 10*3/mm3    Lymphocytes Absolute 1.71 0.70 - 3.10 10*3/mm3    Monocytes Absolute 0.50 0.10 - 0.90 10*3/mm3    Eosinophils Absolute 0.11 0.00 - 0.40 10*3/mm3    Basophils Absolute 0.04 0.00 - 0.20 10*3/mm3    Immature Granulocyte Rel % 0.1 0.0 - 0.5 %    Immature Grans Absolute 0.01 0.00 - 0.05 10*3/mm3    nRBC 0.0 0.0 - 0.2 /100 WBC           Theresa was seen today for headache and wrist pain.    Diagnoses and all orders for this visit:    Chronic cluster headache, not intractable - headaches persistent and worsening in frequency and intensity.  Will schedule MRI.  Try preventive and abortive meds.  F/u in 4 weeks.  Pt to call if worsening prior to this time.   -     MRI Brain With & Without Contrast; Future  -     propranolol LA (Inderal LA) 80 MG 24 hr capsule; Take 1 capsule by mouth Daily.  -     SUMAtriptan (IMITREX) 100 MG tablet; Take one tablet at onset of headache. May repeat dose one time in 2 hours if headache not relieved.  -     ondansetron ODT (Zofran ODT) 8 MG disintegrating tablet; Place 1 tablet on the tongue Every 12 (Twelve) Hours As Needed for  Nausea or Vomiting.  -     diazePAM (Valium) 10 MG tablet; Take 1 pill by mouth 1 hour before MRI.  May repeat x 1 if needed    Tenosynovitis, de Quervain - improving.  Encouraged brace use.     Nausea  -     ondansetron ODT (Zofran ODT) 8 MG disintegrating tablet; Place 1 tablet on the tongue Every 12 (Twelve) Hours As Needed for Nausea or Vomiting.          Outpatient Medications Prior to Visit   Medication Sig Dispense Refill   • citalopram (CeleXA) 20 MG tablet Take 1 tablet by mouth Daily. 90 tablet 3   • Levonorgestrel (KYLEENA) 19.5 MG intrauterine device IUD 1 each by Intrauterine route 1 (One) Time.     • meloxicam (MOBIC) 15 MG tablet Take 1 tablet by mouth Daily. 30 tablet 0   • phentermine (ADIPEX-P) 37.5 MG tablet Take 1 tablet by mouth Every Morning Before Breakfast. 30 tablet 0     No facility-administered medications prior to visit.      New Medications Ordered This Visit   Medications   • propranolol LA (Inderal LA) 80 MG 24 hr capsule     Sig: Take 1 capsule by mouth Daily.     Dispense:  30 capsule     Refill:  0   • SUMAtriptan (IMITREX) 100 MG tablet     Sig: Take one tablet at onset of headache. May repeat dose one time in 2 hours if headache not relieved.     Dispense:  9 tablet     Refill:  1   • ondansetron ODT (Zofran ODT) 8 MG disintegrating tablet     Sig: Place 1 tablet on the tongue Every 12 (Twelve) Hours As Needed for Nausea or Vomiting.     Dispense:  20 tablet     Refill:  0   • diazePAM (Valium) 10 MG tablet     Sig: Take 1 pill by mouth 1 hour before MRI.  May repeat x 1 if needed     Dispense:  2 tablet     Refill:  0     [unfilled]  Medications Discontinued During This Encounter   Medication Reason   • phentermine (ADIPEX-P) 37.5 MG tablet          Return in about 4 weeks (around 9/28/2020) for Recheck.

## 2020-09-01 ENCOUNTER — OFFICE VISIT (OUTPATIENT)
Dept: OBSTETRICS AND GYNECOLOGY | Facility: CLINIC | Age: 30
End: 2020-09-01

## 2020-09-01 VITALS
HEIGHT: 61 IN | SYSTOLIC BLOOD PRESSURE: 106 MMHG | WEIGHT: 226.8 LBS | BODY MASS INDEX: 42.82 KG/M2 | DIASTOLIC BLOOD PRESSURE: 72 MMHG

## 2020-09-01 DIAGNOSIS — Z01.419 PAP SMEAR, LOW-RISK: ICD-10-CM

## 2020-09-01 DIAGNOSIS — Z11.51 SCREENING FOR HPV (HUMAN PAPILLOMAVIRUS): ICD-10-CM

## 2020-09-01 DIAGNOSIS — N87.9 CERVICAL DYSPLASIA: Primary | ICD-10-CM

## 2020-09-01 DIAGNOSIS — Z13.9 SCREENING FOR UNSPECIFIED CONDITION: ICD-10-CM

## 2020-09-01 LAB
BILIRUB BLD-MCNC: NEGATIVE MG/DL
CLARITY, POC: CLEAR
COLOR UR: YELLOW
GLUCOSE UR STRIP-MCNC: NEGATIVE MG/DL
KETONES UR QL: NEGATIVE
LEUKOCYTE EST, POC: NEGATIVE
NITRITE UR-MCNC: NEGATIVE MG/ML
PH UR: 6 [PH] (ref 5–8)
PROT UR STRIP-MCNC: NEGATIVE MG/DL
RBC # UR STRIP: NEGATIVE /UL
SP GR UR: 1 (ref 1–1.03)
UROBILINOGEN UR QL: NORMAL

## 2020-09-01 PROCEDURE — 99213 OFFICE O/P EST LOW 20 MIN: CPT | Performed by: OBSTETRICS & GYNECOLOGY

## 2020-09-01 PROCEDURE — 81002 URINALYSIS NONAUTO W/O SCOPE: CPT | Performed by: OBSTETRICS & GYNECOLOGY

## 2020-09-01 NOTE — PROGRESS NOTES
"PROBLEM VISIT    Chief Complaint:      Theresa Dyson is a 30 y.o. patient who presents for follow up of cervical dysplasia.   Pt has been dealing with a lot of HAs. Planned for MRI. Pt reports that she has an appt with Dr Zapien for left breast mass in 11/2020. LPS 1/2020 LGSIl and colpo in 2/2020 DEBO 1. Pt presents for a repeat pap smear.     Chief Complaint   Patient presents with   • Follow-up     repap             The following portions of the patient's history were reviewed and updated as appropriate: allergies, current medications and problem list.    Review of Systems   Constitutional: Negative for appetite change, chills, fatigue, fever and unexpected weight change.   Gastrointestinal: Negative for abdominal distention, abdominal pain, anal bleeding, blood in stool, constipation, diarrhea, nausea and vomiting.   Genitourinary: Negative for dyspareunia, dysuria, menstrual problem, pelvic pain, vaginal bleeding, vaginal discharge and vaginal pain.   Neurological: Positive for headaches.       /72   Ht 154.9 cm (61\")   Wt 103 kg (226 lb 12.8 oz)   BMI 42.85 kg/m²     Physical Exam   Constitutional: She appears well-developed and well-nourished. No distress.   Genitourinary: There is no rash, tenderness, lesion or injury on the right labia. There is no rash, tenderness, lesion or injury on the left labia. Cervix exhibits no motion tenderness, no discharge and no friability. No erythema, tenderness or bleeding in the vagina. No foreign body in the vagina. No signs of injury around the vagina. No vaginal discharge found.   Neurological: She is alert. No cranial nerve deficit. Coordination normal.   Skin: Skin is warm. No rash noted. She is not diaphoretic. No erythema. No pallor.   Psychiatric: She has a normal mood and affect. Her behavior is normal. Judgment and thought content normal.   Vitals reviewed.        Assessment/Plan   Theresa was seen today for follow-up.    Diagnoses and all orders for " this visit:    Cervical dysplasia    Screening for unspecified condition  -     POC Urinalysis Dipstick    Pap smear, low-risk  -     Pap IG, Rfx HPV ASCU    Screening for HPV (human papillomavirus)  -     Pap IG, Rfx HPV ASCU      31yo with cervical dysplasia    1) Cervical dysplasia: LPS 1/2020 LGSIL. Colpo with biopsy 2/2020 CIN1. Repeat pap smear today.    2) left breat mass: normal imaging. Mass persists. Pt has fu with Dr Zapien 11/2020.    3) HAs: Scheduled for MRI. Suspect migraines.             No follow-ups on file.      Estella Soria DO    9/1/2020  09:51

## 2020-09-08 LAB
CONV .: ABNORMAL
CYTOLOGIST CVX/VAG CYTO: ABNORMAL
CYTOLOGY CVX/VAG DOC CYTO: ABNORMAL
CYTOLOGY CVX/VAG DOC THIN PREP: ABNORMAL
DX ICD CODE: ABNORMAL
DX ICD CODE: ABNORMAL
HIV 1 & 2 AB SER-IMP: ABNORMAL
OTHER STN SPEC: ABNORMAL
PATHOLOGIST CVX/VAG CYTO: ABNORMAL
RECOM F/U CVX/VAG CYTO: ABNORMAL
STAT OF ADQ CVX/VAG CYTO-IMP: ABNORMAL

## 2020-09-18 ENCOUNTER — TELEPHONE (OUTPATIENT)
Dept: INTERNAL MEDICINE | Facility: CLINIC | Age: 30
End: 2020-09-18

## 2020-09-18 ENCOUNTER — HOSPITAL ENCOUNTER (EMERGENCY)
Facility: HOSPITAL | Age: 30
Discharge: HOME | End: 2020-09-18
Attending: EMERGENCY MEDICINE | Admitting: EMERGENCY MEDICINE

## 2020-09-18 VITALS
BODY MASS INDEX: 43.19 KG/M2 | SYSTOLIC BLOOD PRESSURE: 124 MMHG | WEIGHT: 220 LBS | DIASTOLIC BLOOD PRESSURE: 88 MMHG | HEART RATE: 68 BPM | RESPIRATION RATE: 16 BRPM | HEIGHT: 60 IN | OXYGEN SATURATION: 99 % | TEMPERATURE: 97.9 F

## 2020-09-18 DIAGNOSIS — R51.9 ACUTE NONINTRACTABLE HEADACHE, UNSPECIFIED HEADACHE TYPE: Primary | ICD-10-CM

## 2020-09-18 DIAGNOSIS — R90.89 ABNORMAL BRAIN MRI: ICD-10-CM

## 2020-09-18 DIAGNOSIS — G93.2 INTRACRANIAL HYPERTENSION: Primary | ICD-10-CM

## 2020-09-18 PROCEDURE — 99283 EMERGENCY DEPT VISIT LOW MDM: CPT

## 2020-09-18 PROCEDURE — 25010000002 DROPERIDOL PER 5 MG: Performed by: PHYSICIAN ASSISTANT

## 2020-09-18 PROCEDURE — 63710000001 ONDANSETRON ODT 4 MG TABLET DISPERSIBLE: Performed by: PHYSICIAN ASSISTANT

## 2020-09-18 PROCEDURE — 96372 THER/PROPH/DIAG INJ SC/IM: CPT

## 2020-09-18 RX ORDER — ONDANSETRON 4 MG/1
4 TABLET, ORALLY DISINTEGRATING ORAL EVERY 8 HOURS PRN
Qty: 15 TABLET | Refills: 0 | Status: SHIPPED | OUTPATIENT
Start: 2020-09-18 | End: 2020-12-02

## 2020-09-18 RX ORDER — DROPERIDOL 2.5 MG/ML
2.5 INJECTION, SOLUTION INTRAMUSCULAR; INTRAVENOUS ONCE
Status: COMPLETED | OUTPATIENT
Start: 2020-09-18 | End: 2020-09-18

## 2020-09-18 RX ORDER — ONDANSETRON 4 MG/1
4 TABLET, ORALLY DISINTEGRATING ORAL ONCE
Status: COMPLETED | OUTPATIENT
Start: 2020-09-18 | End: 2020-09-18

## 2020-09-18 RX ADMIN — ONDANSETRON 4 MG: 4 TABLET, ORALLY DISINTEGRATING ORAL at 21:56

## 2020-09-18 RX ADMIN — DROPERIDOL 2.5 MG: 2.5 INJECTION, SOLUTION INTRAMUSCULAR; INTRAVENOUS at 21:55

## 2020-09-18 NOTE — ED TRIAGE NOTES
Pt c/o migraine for 2 days. Associated blurred vision,  photophobia, and nausea. Pt reports having an abnormal brain MRI Monday. Pt is alert and oriented x 4. NAD. Pt masked in triage.

## 2020-09-19 NOTE — DISCHARGE INSTRUCTIONS
Keep your ophthalmology appointment on Monday and neurology appointment as scheduled by your PCP.    Return to the emergency department should your headache rebound, should you develop fever, or should you have any visual disturbances.

## 2020-09-19 NOTE — ED PROVIDER NOTES
"Pt presents to the ED c/o  ongoing chronic daily headache.  She had an MRI performed recently that was concerning for intracranial hypertension.  She has a follow-up with ophthalmology and neurology on Monday.  She was given an amp seen in the emergency department and feels \"much better\".     On exam,   Awake, alert, no acute distress.  GCS 15.  Neuro-face is symmetric.  There are no focal neurologic deficits.     Plan: She is safe for discharge with close outpatient follow-up as previously arranged.      PPE was worn by myself and the patient throughout our interaction in the ER.       Attestation:  The JAYESH and I have discussed this patient's history, physical exam, and treatment plan.  I have reviewed the documentation and personally had a face to face interaction with the patient. I affirm the documentation and agree with the treatment and plan.  The attached note describes my personal findings.            Kuldip Briseno MD  09/18/20 5806    "

## 2020-09-19 NOTE — ED PROVIDER NOTES
EMERGENCY DEPARTMENT ENCOUNTER    Room Number:    Date of encounter:  2020  PCP: Heather Leon MD  Historian: Patient      HPI:  Chief Complaint: Headache  A complete HPI/ROS/PMH/PSH/SH/FH are unobtainable due to: Nothing    Context: Theresa Dyson is a 30 y.o. female who presents to the ED c/o ongoing headaches for the past several months daily.  Patient has been seen and evaluated by her PCP and she recently had an MRI that was read for days ago and there was some question/concern of intracranial hypertension.  Patient states she was having some brief episodes of blurry vision associated with her headache earlier today her Imitrex was not helping her symptoms and she was told to come the emergency department for further management evaluation.  The patient denies visual loss and states these visual disturbances she experienced today have been ongoing with her headaches for several months and usually subside when the headaches subside.  She denies associated fever, chills, neck pain, abdominal pain, cough, shortness of breath, chest pain, abdominal pain,  symptoms associated with her headache.  She is not taking any antiplatelet or anticoagulant therapy at this time.  She does have the NuvaRing.      Patient had an MRI of the brain performed on Monday that is been ordered by Dr. Sanchez her PCP due to ongoing headaches.  The MRI was concerning for intracranial hypertension.  The PCP then referred her to ophthalmology and neurology for further evaluation.  These appointments are still pending at this time.  Patient's last admission to this emergency department was 2017 where she was diagnosed with anxiety evaluated by access and discharge.  Her most recent hospital visit on record was at [facility] in 2018 where she was diagnosed with gastroenteritis with gastroenteritis.  His past medical history includes SVT, appendectomy, cholecystectomy,  section.    PAST  MEDICAL HISTORY  Active Ambulatory Problems     Diagnosis Date Noted   • Morbid obesity (CMS/HCC) 2020   • Left breast mass 2020   • Cervical dysplasia 2020     Resolved Ambulatory Problems     Diagnosis Date Noted   • Paroxysmal SVT (supraventricular tachycardia) (CMS/HCC) 2019     Past Medical History:   Diagnosis Date   • Allergic rhinitis    • Anxiety    • Carpal tunnel syndrome, right upper limb    • Chronic migraine without aura, not intractable    • GERD without esophagitis    • Insomnia    • Panic disorder without agoraphobia          PAST SURGICAL HISTORY  Past Surgical History:   Procedure Laterality Date   • ADENOIDECTOMY     •  SECTION     • CHOLECYSTECTOMY     • MIDDLE EAR SURGERY     • MYRINGOTOMY W/ TUBES     • TONSILLECTOMY           FAMILY HISTORY  Family History   Problem Relation Age of Onset   • Hypertension Mother    • Heart failure Father    • Heart disease Father    • Hypertension Father    • Diabetes Paternal Aunt    • Diabetes Paternal Uncle    • Stroke Maternal Grandmother    • Heart disease Paternal Grandfather    • Diabetes Paternal Uncle          SOCIAL HISTORY  Social History     Socioeconomic History   • Marital status: Single     Spouse name: Not on file   • Number of children: Not on file   • Years of education: Not on file   • Highest education level: Not on file   Tobacco Use   • Smoking status: Former Smoker     Packs/day: 0.00     Years: 0.50     Pack years: 0.00     Quit date: 2018     Years since quittin.6   • Smokeless tobacco: Never Used   • Tobacco comment: quit 3 months ago   Substance and Sexual Activity   • Alcohol use: Yes     Frequency: 2-4 times a month     Drinks per session: 1 or 2     Comment: caffeine use   • Drug use: No   • Sexual activity: Yes     Partners: Male     Birth control/protection: I.U.D.         ALLERGIES  Prednisone        REVIEW OF SYSTEMS  Review of Systems     All systems reviewed and negative except for  those discussed in HPI.       PHYSICAL EXAM    I have reviewed the triage vital signs and nursing notes.    ED Triage Vitals [09/18/20 1909]   Temp Heart Rate Resp BP SpO2   97.9 °F (36.6 °C) 79 16 129/84 99 %      Temp src Heart Rate Source Patient Position BP Location FiO2 (%)   Tympanic -- -- Left arm --       Physical Exam  GENERAL: Very pleasant well-nourished female no acute distress  HENT: nares patent, NCAT, mucous membranes moist  Neck: No nuchal rigidity  EYES: no scleral icterus, +3 pupils and PERRL EOMs intact, visual fields intact with left equaling the right  CV: regular rhythm, regular rate, heart sounds normal  RESPIRATORY: normal effort, lungs CTA B  ABDOMEN: soft  MUSCULOSKELETAL: no deformity  NEURO: alert, moves all extremities, follows commands, cranial nerves II through XII intact, cerebellar function intact, gross sensation intact bilateral upper/lower extremities, face, and trunk.  SKIN: warm, dry, no skin rash noted        LAB RESULTS  No results found for this or any previous visit (from the past 24 hour(s)).    Ordered the above labs and independently reviewed the results.        RADIOLOGY  No Radiology Exams Resulted Within Past 24 Hours    I ordered the above noted radiological studies. Reviewed by me and discussed with radiologist.  See dictation for official radiology interpretation.      PROCEDURES    Procedures      MEDICATIONS GIVEN IN ER    Medications   droperidol (INAPSINE) injection 2.5 mg (2.5 mg Intramuscular Given 9/18/20 2155)   ondansetron ODT (ZOFRAN-ODT) disintegrating tablet 4 mg (4 mg Oral Given 9/18/20 2156)         PROGRESS, DATA ANALYSIS, CONSULTS, AND MEDICAL DECISION MAKING    All labs have been independently reviewed by me.  All radiology studies have been reviewed by me and discussed with radiologist dictating the report.   EKG's independently viewed and interpreted by me.  Discussion below represents my analysis of pertinent findings related to patient's  condition, differential diagnosis, treatment plan and final disposition.    DDX includes but is not limited to: Migraine, tension headache, cluster headache, meningitis, hypertensive headache, ocular headache, pseudotumor cerebri, intracranial lesion.  Patient's MRI done earlier this week showed no intracranial lesion.  Patient's blood pressure unremarkable during her ER stay.  Clinically suspect this is a migraine however cannot completely rule out pseudotumor cerebri, especially given recent MRI read.  However, patient headache and visual symptoms are stable and have been ongoing for months.  After IM droperidol and Zofran patient is pain-free,  Asymptomatic, and is no longer having any visual disturbances.  She is ready to go home and appears stable for discharge at this time.  We will have her return to the emergency department with any rebound headache, further visual disturbance, fever, chills, any further concerns.  We will have her to keep her appointment in 3 days for her formal ophthalmology exam to ensure there is no intracranial hypertension/optic nerve involvement.         Patient was placed in face mask in first look. Patient was wearing facemask when I entered the room and throughout our encounter. I wore full protective equipment throughout this patient encounter including a face mask, and gloves. Hand hygiene was performed before donning protective equipment and after removal when leaving the room.    AS OF 06:19 EDT VITALS:    BP - 124/88  HR - 68  TEMP - 97.9 °F (36.6 °C) (Tympanic)  O2 SATS - 99%        DIAGNOSIS  Final diagnoses:   Acute nonintractable headache, unspecified headache type         DISPOSITION  DISCHARGE    Patient discharged in stable condition.    Reviewed implications of results, diagnosis, meds, responsibility to follow up, warning signs and symptoms of possible worsening, potential complications and reasons to return to ER.    Patient/Family voiced understanding of above  instructions.    Discussed plan for discharge, as there is no emergent indication for admission. Patient referred to primary care provider for BP management due to today's BP. Pt/family is agreeable and understands need for follow up and repeat testing.  Pt is aware that discharge does not mean that nothing is wrong but it indicates no emergency is present that requires admission and they must continue care with follow-up as given below or physician of their choice.     FOLLOW-UP  Heather Leon MD  [address] [facility] [address]  [place] [address] [phone]    Schedule an appointment as soon as possible for a visit   For further evaluation and treatment         Medication List      Changed    * ondansetron ODT 8 MG disintegrating tablet  Commonly known as: Zofran ODT  Place 1 tablet on the tongue Every 12 (Twelve) Hours As Needed for Nausea or Vomiting.  What changed: Another medication with the same name was added. Make sure you understand how and when to take each.     * ondansetron ODT 4 MG disintegrating tablet  Commonly known as: Zofran ODT  Place 1 tablet on the tongue Every 8 (Eight) Hours As Needed for Nausea or Vomiting.  What changed: You were already taking a medication with the same name, and this prescription was added. Make sure you understand how and when to take each.         * This list has 2 medication(s) that are the same as other medications prescribed for you. Read the directions carefully, and ask your doctor or other care provider to review them with you.               Where to Get Your Medications      You can get these medications from any pharmacy    Bring a paper prescription for each of these medications  · ondansetron ODT 4 MG disintegrating tablet                Portillo Estrada III, PA  09/19/20 0654

## 2020-09-21 ENCOUNTER — TELEPHONE (OUTPATIENT)
Dept: INTERNAL MEDICINE | Facility: CLINIC | Age: 30
End: 2020-09-21

## 2020-09-21 NOTE — TELEPHONE ENCOUNTER
Patient called and needs a new referral for neurology. Dr. Gamion office called today and cant get her in until Feb. Advised she contact PCP for a new referral to somewhere else. Pt also went to ER on Fri 9/18 for a migraine and stated the vision in her left eye is getting worse. Please call back and advise if she can get a new referral to [phone].

## 2020-09-22 ENCOUNTER — TELEPHONE (OUTPATIENT)
Dept: OBSTETRICS AND GYNECOLOGY | Facility: CLINIC | Age: 30
End: 2020-09-22

## 2020-09-22 ENCOUNTER — TELEPHONE (OUTPATIENT)
Dept: INTERNAL MEDICINE | Facility: CLINIC | Age: 30
End: 2020-09-22

## 2020-09-22 NOTE — TELEPHONE ENCOUNTER
PATIENT CALLED IN AND STATED THAT THE NEUROLOGIST THAT SHE WAS REFERRED TO CAN NOT SEE UNTIL FEB. PATIENT IS WANTING TO KNOW IF A REFERRAL CAN BE SENT FOR DR TONE JEAN INSTEAD TO SEE IF HE CAN SEE HER SOONER.     PATIENT IS WANTING TO KNOW IF THE 2ND CONCLUSION OF THE MRI RESULTS SHOULD SHE FOLLOW UP WITH AN ENT DUE HER HISTORY WITH COLOSTRIO  TERMA  THAT MEASURED 5-7 MILAMMETERS  WAS TREATED WITH 4 DIFFERENT SURGERIES .     PLEASE ADVISE ONCE COMPLETED     [phone]

## 2020-09-22 NOTE — TELEPHONE ENCOUNTER
Pt wants to know if you could remove her kyleena iud during her colpo appointment on 10/6/20, she claims she no longer is satisfied with the device and thinks she would like to switch to the ParaGard after discussing it with you?

## 2020-09-28 ENCOUNTER — TELEMEDICINE (OUTPATIENT)
Dept: INTERNAL MEDICINE | Facility: CLINIC | Age: 30
End: 2020-09-28

## 2020-09-28 DIAGNOSIS — G44.029 CHRONIC CLUSTER HEADACHE, NOT INTRACTABLE: ICD-10-CM

## 2020-09-28 DIAGNOSIS — G93.2 INTRACRANIAL HYPERTENSION: Primary | ICD-10-CM

## 2020-09-28 DIAGNOSIS — H71.90 CHOLESTEATOMA, UNSPECIFIED LATERALITY: ICD-10-CM

## 2020-09-28 PROCEDURE — 99214 OFFICE O/P EST MOD 30 MIN: CPT | Performed by: INTERNAL MEDICINE

## 2020-09-28 NOTE — PROGRESS NOTES
Theresa Dyson is a 30 y.o. female, who presents with a chief complaint of   Chief Complaint   Patient presents with   • Headache       HPI   Pt here for f/u.      HA - she I still having headaches.  She had a MRI that was abnormal.  She saw ophthalmology and no papilledema.  No help with imitrex.  Ibuprofen does help some.  She was in the ED 9/18 bc of the headache.  Overall headaches about the same.  She isnt having ha every day.  Some days ha's more intense.  She has had some phono and photophobia.  She is keeping a HA log.  She is still on the propranolol and does think this helps some.  Neurology at [facility] couldn't get her in until February so she was able to get an appt with Chauncey 10/19.  She was also worried her IUD could be contributing to the headaches.  She has a Kyleena IUD.  She has an appt with Dr. Soria on 10/6 to discuss whether removal would help her or not.     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  There was also an abnormality on the MRI related to this.  No ear pain.  She is due for her yearly ENT f/u.  She does have a cd of her MRI and copy of the report.        The following portions of the patient's history were reviewed and updated as appropriate: allergies, current medications, past family history, past medical history, past social history, past surgical history and problem list.    Allergies: Prednisone    Review of Systems   Constitutional: Negative.    Eyes: Positive for photophobia.   Respiratory: Negative.    Cardiovascular: Negative.    Gastrointestinal: Negative.    Endocrine: Negative.    Genitourinary: Negative.    Musculoskeletal: Negative.    Skin: Negative.    Allergic/Immunologic: Negative.    Neurological: Positive for headaches.   Hematological: Negative.    Psychiatric/Behavioral: Negative.    All other systems reviewed and are negative.            Wt Readings from Last 3 Encounters:   09/18/20 99.8 kg (220 lb)   09/01/20 103 kg (226 lb 12.8 oz)    08/03/20 100 kg (221 lb 3.2 oz)     Temp Readings from Last 3 Encounters:   09/18/20 97.9 °F (36.6 °C) (Tympanic)   08/03/20 97.8 °F (36.6 °C) (Oral)   04/23/18 98.7 °F (37.1 °C)     BP Readings from Last 3 Encounters:   09/18/20 124/88   09/01/20 106/72   08/03/20 122/74     Pulse Readings from Last 3 Encounters:   09/18/20 68   08/03/20 77   05/01/19 55     There is no height or weight on file to calculate BMI.  @LASTSAO2(3)@    Physical Exam  Vitals signs and nursing note reviewed.   Constitutional:       Appearance: She is well-developed.   HENT:      Head: Normocephalic and atraumatic.      Nose: Nose normal.   Eyes:      General:         Right eye: No discharge.         Left eye: No discharge.      Conjunctiva/sclera: Conjunctivae normal.   Neck:      Musculoskeletal: Normal range of motion and neck supple.   Pulmonary:      Effort: Pulmonary effort is normal. No respiratory distress.   Skin:     Findings: No rash.   Neurological:      Mental Status: She is alert and oriented to person, place, and time.   Psychiatric:         Behavior: Behavior normal.         Thought Content: Thought content normal.         Judgment: Judgment normal.         Results for orders placed or performed in visit on 09/01/20   POC Urinalysis Dipstick    Specimen: Urine   Result Value Ref Range    Color Yellow Yellow, Straw, Dark Yellow, Danna    Clarity, UA Clear Clear    Glucose, UA Negative Negative, 1000 mg/dL (3+) mg/dL    Bilirubin Negative Negative    Ketones, UA Negative Negative    Specific Gravity  1.005 1.005 - 1.030    Blood, UA Negative Negative    pH, Urine 6.0 5.0 - 8.0    Protein, POC Negative Negative mg/dL    Urobilinogen, UA Normal Normal    Leukocytes Negative Negative    Nitrite, UA Negative Negative   Pap IG, Rfx HPV ASCU    Specimen: Cervix; ThinPrep Vial   Result Value Ref Range    Diagnosis Comment (A)     Recommendation: Comment (A)     Specimen adequacy: Comment     Clinician Provided ICD-10: Comment      Performed by: Comment     Electronically signed by: Comment     . .     Pathologist Provided ICD-10: Comment     Note: Comment     Method: Comment     Conv .conv Comment            Theresa was seen today for headache.    Diagnoses and all orders for this visit:    Intracranial hypertension - cont propranolol.  Ophthalmology eval ok.  Needs f/u w/ neurology and has appt on 10/19.    Cholesteatoma, unspecified laterality - pt needs to schedule f/u with ENT    Chronic cluster headache, not intractable - stop imitrex as this isnt helping.          Outpatient Medications Prior to Visit   Medication Sig Dispense Refill   • citalopram (CeleXA) 20 MG tablet Take 1 tablet by mouth Daily. 90 tablet 3   • diazePAM (Valium) 10 MG tablet Take 1 pill by mouth 1 hour before MRI.  May repeat x 1 if needed 2 tablet 0   • Levonorgestrel (KYLEENA) 19.5 MG intrauterine device IUD 1 each by Intrauterine route 1 (One) Time.     • meloxicam (MOBIC) 15 MG tablet Take 1 tablet by mouth Daily. 30 tablet 0   • ondansetron ODT (Zofran ODT) 4 MG disintegrating tablet Place 1 tablet on the tongue Every 8 (Eight) Hours As Needed for Nausea or Vomiting. 15 tablet 0   • ondansetron ODT (Zofran ODT) 8 MG disintegrating tablet Place 1 tablet on the tongue Every 12 (Twelve) Hours As Needed for Nausea or Vomiting. 20 tablet 0   • propranolol LA (Inderal LA) 80 MG 24 hr capsule Take 1 capsule by mouth Daily. 30 capsule 0   • SUMAtriptan (IMITREX) 100 MG tablet Take one tablet at onset of headache. May repeat dose one time in 2 hours if headache not relieved. 9 tablet 1     No facility-administered medications prior to visit.      No orders of the defined types were placed in this encounter.    [unfilled]  Medications Discontinued During This Encounter   Medication Reason   • SUMAtriptan (IMITREX) 100 MG tablet          Return in about 1 month (around 10/28/2020) for Recheck.

## 2020-10-06 ENCOUNTER — OFFICE VISIT (OUTPATIENT)
Dept: OBSTETRICS AND GYNECOLOGY | Facility: CLINIC | Age: 30
End: 2020-10-06

## 2020-10-06 VITALS
BODY MASS INDEX: 42.44 KG/M2 | HEIGHT: 61 IN | DIASTOLIC BLOOD PRESSURE: 86 MMHG | WEIGHT: 224.8 LBS | SYSTOLIC BLOOD PRESSURE: 128 MMHG

## 2020-10-06 DIAGNOSIS — Z30.432 ENCOUNTER FOR IUD REMOVAL: ICD-10-CM

## 2020-10-06 DIAGNOSIS — Z13.9 SCREENING FOR UNSPECIFIED CONDITION: ICD-10-CM

## 2020-10-06 DIAGNOSIS — R87.612 LOW GRADE SQUAMOUS INTRAEPITH LESION ON CYTOLOGIC SMEAR CERVIX (LGSIL): Primary | ICD-10-CM

## 2020-10-06 PROCEDURE — 57455 BIOPSY OF CERVIX W/SCOPE: CPT | Performed by: OBSTETRICS & GYNECOLOGY

## 2020-10-06 PROCEDURE — 81025 URINE PREGNANCY TEST: CPT | Performed by: OBSTETRICS & GYNECOLOGY

## 2020-10-06 PROCEDURE — 58301 REMOVE INTRAUTERINE DEVICE: CPT | Performed by: OBSTETRICS & GYNECOLOGY

## 2020-10-06 NOTE — PROGRESS NOTES
IUD Removal Procedure Note    Chief Complaint: IUD removal    Type of IUD:  Kyleena IUD  Date of insertion:  known  Reason for removal:  Side effect: HAs  Other relevant history/information:  none    Procedure Time Out Documentation      Procedure Details  IUD strings visible:  no  Local anesthesia:  None  Tenaculum used:  None  Removal:  An alligator forceps was entered through the cervical os after the cervix and vagina were prepped.  The IUD was grasped and removed intact.  1 attempt(s) were needed for successful removal.  The patient tolerated the procedure well.    All appropriate instructions regarding removal were reviewed.    Tolerated well  No apparent complications  Post procedure diagnosis : IUD removal     Plans for contraception:  condoms    Other follow-up needed:  none    The patient was advised to call for any fever or for prolonged or severe pain or bleeding. She was advised to use OTC ibuprofen as needed for mild to moderate pain.       Estella Soria DO    10/6/2020  12:48 EDT

## 2020-10-06 NOTE — PROGRESS NOTES
Colposcopy Procedure Note    Chief Complaint: LGSIL    Colposcopy    Date/Time: 10/6/2020 12:33 PM  Performed by: Estella Soria DO  Authorized by: Estella Soria DO   Preparation: Patient was prepped and draped in the usual sterile fashion.  Local anesthesia used: no    Anesthesia:  Local anesthesia used: no    Sedation:  Patient sedated: no    Patient tolerance: patient tolerated the procedure well with no immediate complications        Indications: Most recent Pap smear showed: low-grade squamous intraepithelial neoplasia (LGSIL - encompassing HPV,mild dysplasia,DEBO I).  Prior cervical/vaginal disease: DEBO 1.  Prior cervical treatment: no treatment.    Procedure Details   The risks and benefits of the procedure and Verbal informed consent obtained.    Speculum placed in vagina and excellent visualization of cervix achieved, cervix swabbed x 3 with acetic acid solution and Lugol's solution     Findings:  Cervix: acetowhite lesion(s) noted at 1 o'clock; cervical biopsies taken at 1 o'clock, specimen labelled and sent to pathology and hemostasis achieved with Monsel's solution.  Vaginal inspection: vaginal colposcopy not performed.  Vulvar colposcopy: vulvar colposcopy not performed.    Specimens: cervical biopsy at 1 oclock    Complications: none.    Plan:  Specimens labelled and sent to Pathology.  Will base further treatment on Pathology findings.  Treatment options discussed with patient.    sEtella Soria DO   10/6/2020  12:32 EDT

## 2020-10-08 LAB
PATH REPORT.FINAL DX SPEC: NORMAL
PATH REPORT.GROSS SPEC: NORMAL
PATH REPORT.RELEVANT HX SPEC: NORMAL
PATH REPORT.SITE OF ORIGIN SPEC: NORMAL
PATHOLOGIST NAME: NORMAL
PAYMENT PROCEDURE: NORMAL

## 2020-10-22 ENCOUNTER — TELEPHONE (OUTPATIENT)
Dept: SURGERY | Facility: CLINIC | Age: 30
End: 2020-10-22

## 2020-10-26 ENCOUNTER — TELEPHONE (OUTPATIENT)
Dept: SURGERY | Facility: CLINIC | Age: 30
End: 2020-10-26

## 2020-10-26 ENCOUNTER — OFFICE VISIT (OUTPATIENT)
Dept: SURGERY | Facility: CLINIC | Age: 30
End: 2020-10-26

## 2020-10-26 VITALS
HEART RATE: 71 BPM | BODY MASS INDEX: 42.48 KG/M2 | TEMPERATURE: 98.5 F | OXYGEN SATURATION: 96 % | HEIGHT: 61 IN | DIASTOLIC BLOOD PRESSURE: 76 MMHG | WEIGHT: 225 LBS | SYSTOLIC BLOOD PRESSURE: 109 MMHG

## 2020-10-26 DIAGNOSIS — N64.4 MASTODYNIA: ICD-10-CM

## 2020-10-26 DIAGNOSIS — N63.0 LUMP OR MASS IN BREAST: Primary | ICD-10-CM

## 2020-10-26 DIAGNOSIS — E66.01 MORBID (SEVERE) OBESITY DUE TO EXCESS CALORIES (HCC): ICD-10-CM

## 2020-10-26 PROCEDURE — 99242 OFF/OP CONSLTJ NEW/EST SF 20: CPT | Performed by: SURGERY

## 2020-10-26 RX ORDER — ZONISAMIDE 100 MG/1
CAPSULE ORAL
COMMUNITY
Start: 2020-10-19 | End: 2021-12-14

## 2020-10-26 RX ORDER — NAPROXEN SODIUM 220 MG
220 TABLET ORAL 2 TIMES DAILY PRN
COMMUNITY
End: 2023-01-04

## 2020-10-26 RX ORDER — CHOLECALCIFEROL (VITAMIN D3) 125 MCG
5 CAPSULE ORAL
COMMUNITY
End: 2023-01-04

## 2020-10-26 NOTE — TELEPHONE ENCOUNTER
Scheduled Left Breast U/s at [facility]  4-26-21 @ 9:30    Pt will need appt with Brooke Simpson Lm on patient's phone.  Iram      Scheduled appointment for NP Brooke Simpson  5-3-21 arrive 12:15    LM on patient's phone.  Iram

## 2020-10-26 NOTE — PROGRESS NOTES
Chief Complaint: Theresa yDson is a 30 y.o. female who was seen in consultation at the request of Estella Soria DO  for breast mass    History of Present Illness:  Patient presents with breast mass.  She had a breast exam by her gynecologist in 2020 at her 4-week checkup after her Kyleena was placed.  At that visit Dr. Soria noticed a left breast nodularity.  She saw her in follow-up in  and noted that the nodularity had not improved so arranged for her to see us.    She noted no other new masses, skin changes, nipple discharge, nipple changes prior to her most recent imaging.  Her most recent imaging includes the followin2020 [facility]  Radiology  Theresa Dyson  US LEFT BREAST  One week history of palpable nontender lump in the lower midline left breast.  Normal ultrasound examination of the left breast.    She has not had a breast biopsy in the past.  She has her uterus and ovaries, is premenopausal, and has not been taking birth control since her Kyleena was removed in 2020.  This was removed due to worsening headaches.  She tells me that her headaches have not improved since the Kyleena was removed.  Her family history includes the following: She has no sisters, one maternal aunt, one paternal aunt.  No family history of breast or ovarian cancer.    He has gained 35 pounds since 2017 when she quit smoking.  She is here for evaluation.    Review of Systems:  Review of Systems   Constitutional: Positive for fatigue and unexpected weight change (30 LBS OVER LAST 2 YRS).   Endocrine: Positive for hot flashes.   Neurological: Positive for dizziness, headaches and light-headedness.   Psychiatric/Behavioral: Positive for decreased concentration. The patient is nervous/anxious.    All other systems reviewed and are negative.       Past Medical and Surgical History:  Breast Biopsy History:  Patient has not had a breast biopsy in the past.  Breast Cancer  HIstory:  Patient does not have a past medical history of breast cancer.  Breast Operations, and year:  NONE   Obstetric/Gynecologic History:  Age menstrual periods began: 13  Patient is premenopausal, first day of last period: 10/9/2020  Number of pregnancies:1  Number of live births: 1  Number of abortions or miscarriages: 0  Age of delivery of first child: 18  Patient did not breast feed.  Length of time taking birth control pills: 1 YR  Patient has never taken hormone replacement  PATIENT HAS BOTH OVARIES AND UTERUS.     Past Surgical History:   Procedure Laterality Date   • ADENOIDECTOMY     •  SECTION     • CHOLECYSTECTOMY     • MIDDLE EAR SURGERY     • MYRINGOTOMY W/ TUBES     • TONSILLECTOMY       Past Medical History:   Diagnosis Date   • Allergic rhinitis    • Anxiety    • Carpal tunnel syndrome, right upper limb    • Chronic migraine without aura, not intractable    • GERD without esophagitis    • Insomnia    • Panic disorder without agoraphobia        Prior Hospitalizations, other than for surgery or childbirth, and year:  NONE     Social History     Socioeconomic History   • Marital status: Single     Spouse name: Not on file   • Number of children: Not on file   • Years of education: Not on file   • Highest education level: Not on file   Tobacco Use   • Smoking status: Former Smoker     Packs/day: 0.00     Years: 0.50     Pack years: 0.00     Quit date: 2018     Years since quittin.7   • Smokeless tobacco: Never Used   • Tobacco comment: quit 3 months ago   Substance and Sexual Activity   • Alcohol use: Yes     Frequency: 2-4 times a month     Drinks per session: 1 or 2     Comment: caffeine use   • Drug use: No   • Sexual activity: Yes     Partners: Male     Birth control/protection: I.U.D.     Patient is .  Patient is employed full time with the following occupation:   Patient drinks 1 servings of caffeine per day.    Family History:  Family History   Problem  "Relation Age of Onset   • Hypertension Mother    • Heart failure Father    • Heart disease Father    • Hypertension Father    • Diabetes Paternal Aunt    • Diabetes Paternal Uncle    • Stroke Maternal Grandmother    • Heart disease Paternal Grandfather    • Diabetes Paternal Uncle        Vital Signs:  /76   Pulse 71   Temp 98.5 °F (36.9 °C)   Ht 154.9 cm (61\")   Wt 102 kg (225 lb)   LMP  (LMP Unknown)   SpO2 96%   Breastfeeding No   BMI 42.51 kg/m²      Medications:    Current Outpatient Medications:   •  citalopram (CeleXA) 20 MG tablet, Take 1 tablet by mouth Daily., Disp: 90 tablet, Rfl: 3  •  melatonin 5 MG tablet tablet, Take 5 mg by mouth., Disp: , Rfl:   •  naproxen sodium (ALEVE) 220 MG tablet, Take 220 mg by mouth 2 (Two) Times a Day As Needed., Disp: , Rfl:   •  ondansetron ODT (Zofran ODT) 8 MG disintegrating tablet, Place 1 tablet on the tongue Every 12 (Twelve) Hours As Needed for Nausea or Vomiting., Disp: 20 tablet, Rfl: 0  •  propranolol LA (Inderal LA) 80 MG 24 hr capsule, Take 1 capsule by mouth Daily. (Patient taking differently: Take 40 mg by mouth Daily.), Disp: 30 capsule, Rfl: 0  •  zonisamide (ZONEGRAN) 100 MG capsule, , Disp: , Rfl:   •  ondansetron ODT (Zofran ODT) 4 MG disintegrating tablet, Place 1 tablet on the tongue Every 8 (Eight) Hours As Needed for Nausea or Vomiting., Disp: 15 tablet, Rfl: 0     Allergies:  Allergies   Allergen Reactions   • Prednisone Hives       Physical Examination:  /76   Pulse 71   Temp 98.5 °F (36.9 °C)   Ht 154.9 cm (61\")   Wt 102 kg (225 lb)   LMP  (LMP Unknown)   SpO2 96%   Breastfeeding No   BMI 42.51 kg/m²   General Appearance:  Patient is in no distress.  She is well kept and has an morbidly obese build.   Psychiatric:  Patient with appropriate mood and affect. Alert and oriented to self, time, and place.    Breast, RIGHT:  small sized, symmetric with the contralateral side.  Breast skin is without erythema, edema, rashes.  " There are no visible abnormalities upon inspection during the arm-raising maneuver or with hands on hips in the sitting position. There is no nipple retraction, discharge or nipple/areolar skin changes.There are no masses palpable in the sitting or supine positions.    Breast, LEFT:  small sized, symmetric with the contralateral side.  Breast skin is without erythema, edema, rashes.  There are no visible abnormalities upon inspection during the arm-raising maneuver or with hands on hips in the sitting position. There is no nipple retraction, discharge or nipple/areolar skin changes. There is a palpable area of spindle-shaped dense breast tissue at the 6:00, 2.5 cm from the nipple of area of interest to the patient.  Smoothly marginated. This is clinically consistent with superficial and dense breast tissue.  This is what is also seen on her bedside ultrasound.  There are no other masses palpable in the sitting or supine positions.    Lymphatic:  There is no axillary, cervical, infraclavicular, or supraclavicular adenopathy bilaterally.  Eyes:  Pupils are round and reactive to light.  Cardiovascular:  Heart rate and rhythm are regular.  Respiratory:  Lungs are clear bilaterally with no crackles or wheezes in any lung field.  Gastrointestinal:  Abdomen is soft, nondistended, and nontender.     Musculoskeletal:  Good strength in all 4 extremities.   There is good range of motion in both shoulders.    Skin:  No new skin lesions or rashes on the skin excluding the breast (see breast exam above).        Imagin2020 [facility]  Radiology  Theresa Karis  US LEFT BREAST  One week history of palpable nontender lump in the lower midline left breast.  Normal ultrasound examination of the left breast.        Procedures:      Assessment:   Diagnosis Plan   1. Lump or mass in breast  US Breast Left Limited   2. Morbid (severe) obesity due to excess calories (CMS/HCC)     3. Mastodynia     1-  LEFT breast  6:00, 2.5 CFN- normal imaging 3-3-20 and in office bedside check   - plan for 4-2020 ultrasound at [facility] and to see Brooke Tatiana after    2-  BMI 43    3-  cyclical    Plan:  The patient goes by Theresa.    She is friends with our patient Deborah Harris.    We reviewed her history, imaging, imaging reports, bedside ultrasound examination together today.  I am reassured by her imaging and examination.  I do believe that this is an area of dense breast tissue that is palpable.  On her bedside ultrasound today dense fibroglandular tissue is seen at the area of palpable finding. No abnormal solid or cystic masses seen.    So that we can have at least 1 year of clinical follow-up, I have ordered a left breast ultrasound at [facility] and to see our nurse practitioner Brooke Simpson thereafter.  I will arrange this for April 2021.    We also discussed her breast discomfort that is cyclical.  This is separate from the above discomfort associated with manipulating the area of interest.  She tells me that this cyclical breast tenderness is not bothersome to her.  We discussed that if it became bothersome to her that we have supplements that can help.  At this time she was not interested.    I asked Theresa to continue her self breast exam and to call us in the interim with concerns would be happy to see her back sooner.      Lizzette Zapien MD        We have spent 30 minutes in visit today, 20  in face to face .      Next Appointment:  Return for Next scheduled follow up, after imaging, with Brooke Simpson.      EMR Dragon/transcription disclaimer:    Much of this encounter note is an electronic transcription/translocation of spoken language to printed text.  The electronic translation of spoken language may permit erroneous, or at times, nonsensical words or phrases to be inadvertently transcribed.  Although I have reviewed the note from such areas, some may still exist.

## 2020-11-23 ENCOUNTER — TELEPHONE (OUTPATIENT)
Dept: NEUROLOGY | Facility: CLINIC | Age: 30
End: 2020-11-23

## 2020-11-23 NOTE — TELEPHONE ENCOUNTER
PT WAS CALLED TO SEE IF THEY COULD COME IN SOONER. PT STATED THAT SHE SAW [facility] AND WANTED TO KNOW IF SHE COULD STILL COME SEE DR HUNTER FOR A SECOND OPINION. PLEASE ADVISE AND CALL PT IF DR HUNTER WILL STILL SEE PT. CALL PT [phone]

## 2020-11-23 NOTE — TELEPHONE ENCOUNTER
Please advise. Pt has already seen [facility] and has f/u apts with them.     Called patient and explained she will now need to continue care with [facility].     Canceling apts and documenting in referral.

## 2020-12-02 ENCOUNTER — OFFICE VISIT (OUTPATIENT)
Dept: INTERNAL MEDICINE | Facility: CLINIC | Age: 30
End: 2020-12-02

## 2020-12-02 VITALS
OXYGEN SATURATION: 99 % | WEIGHT: 226.8 LBS | HEIGHT: 61 IN | RESPIRATION RATE: 16 BRPM | SYSTOLIC BLOOD PRESSURE: 112 MMHG | TEMPERATURE: 97.8 F | BODY MASS INDEX: 42.82 KG/M2 | DIASTOLIC BLOOD PRESSURE: 74 MMHG | HEART RATE: 68 BPM

## 2020-12-02 DIAGNOSIS — Z00.00 HEALTHCARE MAINTENANCE: Primary | ICD-10-CM

## 2020-12-02 DIAGNOSIS — Z23 NEED FOR INFLUENZA VACCINATION: ICD-10-CM

## 2020-12-02 PROCEDURE — 90686 IIV4 VACC NO PRSV 0.5 ML IM: CPT | Performed by: INTERNAL MEDICINE

## 2020-12-02 PROCEDURE — 90472 IMMUNIZATION ADMIN EACH ADD: CPT | Performed by: INTERNAL MEDICINE

## 2020-12-02 PROCEDURE — 90715 TDAP VACCINE 7 YRS/> IM: CPT | Performed by: INTERNAL MEDICINE

## 2020-12-02 PROCEDURE — 90471 IMMUNIZATION ADMIN: CPT | Performed by: INTERNAL MEDICINE

## 2020-12-02 PROCEDURE — 99395 PREV VISIT EST AGE 18-39: CPT | Performed by: INTERNAL MEDICINE

## 2020-12-02 RX ORDER — ONDANSETRON 4 MG/1
4 TABLET, ORALLY DISINTEGRATING ORAL EVERY 8 HOURS PRN
Qty: 15 TABLET | Refills: 0 | Status: SHIPPED | OUTPATIENT
Start: 2020-12-02 | End: 2021-04-06

## 2020-12-02 NOTE — PROGRESS NOTES
Theresa Dyson is a 30 y.o. female, who presents with a chief complaint of   Chief Complaint   Patient presents with   • Annual Exam       HPI   Pt here for her physical.    Pap UTD    She is due for tdap and flu shot.    Labs were done in August and were normal    She tries to stay active but isnt exercising regularly.  She knows that she needs to eat a healthier diet.      The one year anniversary of her dad's passing is coming up so this is tough.      UTD at dentist and eye doctor    The following portions of the patient's history were reviewed and updated as appropriate: allergies, current medications, past family history, past medical history, past social history, past surgical history and problem list.    Allergies: Prednisone    Review of Systems   Constitutional: Negative.    HENT: Negative.    Eyes: Negative.    Respiratory: Negative.    Cardiovascular: Negative.    Gastrointestinal: Negative.    Endocrine: Negative.    Genitourinary: Negative.    Musculoskeletal: Negative.    Skin: Negative.    Allergic/Immunologic: Negative.    Neurological: Negative.    Hematological: Negative.    Psychiatric/Behavioral: Negative.    All other systems reviewed and are negative.            Wt Readings from Last 3 Encounters:   12/02/20 103 kg (226 lb 12.8 oz)   10/26/20 102 kg (225 lb)   10/06/20 102 kg (224 lb 12.8 oz)     Temp Readings from Last 3 Encounters:   12/02/20 97.8 °F (36.6 °C) (Temporal)   10/26/20 98.5 °F (36.9 °C)   09/18/20 97.9 °F (36.6 °C) (Tympanic)     BP Readings from Last 3 Encounters:   12/02/20 112/74   10/26/20 109/76   10/06/20 128/86     Pulse Readings from Last 3 Encounters:   12/02/20 68   10/26/20 71   09/18/20 68     Body mass index is 42.85 kg/m².  @LASTSAO2(3)@    Physical Exam  Vitals signs and nursing note reviewed.   Constitutional:       General: She is not in acute distress.     Appearance: She is well-developed.   HENT:      Head: Normocephalic and atraumatic.      Right Ear:  External ear normal.      Left Ear: External ear normal.      Nose: Nose normal.   Eyes:      General: No scleral icterus.        Right eye: No discharge.         Left eye: No discharge.      Conjunctiva/sclera: Conjunctivae normal.      Pupils: Pupils are equal, round, and reactive to light.   Neck:      Musculoskeletal: Normal range of motion and neck supple.      Thyroid: No thyromegaly.      Vascular: No JVD.      Trachea: No tracheal deviation.   Cardiovascular:      Rate and Rhythm: Normal rate and regular rhythm.      Heart sounds: Normal heart sounds. No murmur.   Pulmonary:      Effort: Pulmonary effort is normal. No respiratory distress.      Breath sounds: Normal breath sounds. No wheezing.   Abdominal:      General: There is no distension.      Palpations: Abdomen is soft. There is no mass.      Tenderness: There is no abdominal tenderness. There is no guarding or rebound.   Musculoskeletal: Normal range of motion.         General: No tenderness.   Lymphadenopathy:      Cervical: No cervical adenopathy.   Skin:     General: Skin is warm and dry.      Coloration: Skin is not pale.      Findings: No rash.   Neurological:      Mental Status: She is alert and oriented to person, place, and time.      Cranial Nerves: No cranial nerve deficit.      Motor: No abnormal muscle tone.      Deep Tendon Reflexes: Reflexes are normal and symmetric.   Psychiatric:         Behavior: Behavior normal.         Thought Content: Thought content normal.         Judgment: Judgment normal.         Results for orders placed or performed in visit on 10/06/20   POC Pregnancy, Urine    Specimen: Urine   Result Value Ref Range    HCG, Urine, QL Negative Negative    Lot Number [id number]     Internal Positive Control Positive     Internal Negative Control Negative    Reference Histopathology    Specimen: Cervix; Tissue   Result Value Ref Range    Conv .conv Comment     . Comment     . Comment     . Comment     . Comment     . Comment             Diagnoses and all orders for this visit:    1. Healthcare maintenance (Primary)  -     Tdap Vaccine Greater Than or Equal To 8yo IM    2. Need for influenza vaccination  -     Fluarix/Fluzone/Afluria Quad>6 Months          Outpatient Medications Prior to Visit   Medication Sig Dispense Refill   • citalopram (CeleXA) 20 MG tablet Take 1 tablet by mouth Daily. 90 tablet 3   • melatonin 5 MG tablet tablet Take 5 mg by mouth.     • naproxen sodium (ALEVE) 220 MG tablet Take 220 mg by mouth 2 (Two) Times a Day As Needed.     • ondansetron ODT (Zofran ODT) 4 MG disintegrating tablet Place 1 tablet on the tongue Every 8 (Eight) Hours As Needed for Nausea or Vomiting. 15 tablet 0   • ondansetron ODT (Zofran ODT) 8 MG disintegrating tablet Place 1 tablet on the tongue Every 12 (Twelve) Hours As Needed for Nausea or Vomiting. 20 tablet 0   • propranolol LA (Inderal LA) 80 MG 24 hr capsule Take 1 capsule by mouth Daily. (Patient taking differently: Take 40 mg by mouth Daily.) 30 capsule 0   • zonisamide (ZONEGRAN) 100 MG capsule        No facility-administered medications prior to visit.      No orders of the defined types were placed in this encounter.    [unfilled]  There are no discontinued medications.      Return in about 1 year (around 12/2/2021) for Annual physical, labs.

## 2021-03-08 ENCOUNTER — OFFICE VISIT (OUTPATIENT)
Dept: INTERNAL MEDICINE | Facility: CLINIC | Age: 31
End: 2021-03-08

## 2021-03-08 VITALS
TEMPERATURE: 98 F | BODY MASS INDEX: 43.23 KG/M2 | HEIGHT: 61 IN | DIASTOLIC BLOOD PRESSURE: 78 MMHG | RESPIRATION RATE: 14 BRPM | SYSTOLIC BLOOD PRESSURE: 106 MMHG | OXYGEN SATURATION: 96 % | HEART RATE: 95 BPM | WEIGHT: 229 LBS

## 2021-03-08 DIAGNOSIS — L03.032 PARONYCHIA OF TOE OF LEFT FOOT DUE TO INGROWN TOENAIL: Primary | ICD-10-CM

## 2021-03-08 DIAGNOSIS — L60.0 PARONYCHIA OF TOE OF LEFT FOOT DUE TO INGROWN TOENAIL: Primary | ICD-10-CM

## 2021-03-08 PROCEDURE — 99213 OFFICE O/P EST LOW 20 MIN: CPT | Performed by: INTERNAL MEDICINE

## 2021-03-08 RX ORDER — METOCLOPRAMIDE 10 MG/1
10 TABLET ORAL
COMMUNITY
Start: 2020-12-09

## 2021-03-08 RX ORDER — PROPRANOLOL HYDROCHLORIDE 20 MG/1
40 TABLET ORAL
COMMUNITY
Start: 2021-01-05

## 2021-03-08 RX ORDER — SULFAMETHOXAZOLE AND TRIMETHOPRIM 800; 160 MG/1; MG/1
1 TABLET ORAL 2 TIMES DAILY
Qty: 14 TABLET | Refills: 0 | Status: SHIPPED | OUTPATIENT
Start: 2021-03-08 | End: 2021-03-15

## 2021-03-08 RX ORDER — RIZATRIPTAN BENZOATE 10 MG/1
10 TABLET ORAL
COMMUNITY
Start: 2020-12-09 | End: 2023-01-04

## 2021-03-08 RX ORDER — METOCLOPRAMIDE 10 MG/1
TABLET ORAL
COMMUNITY
Start: 2020-12-09 | End: 2021-03-08

## 2021-03-08 NOTE — PROGRESS NOTES
"      Theresa Dyson is a 30 y.o. female, who presents with a chief complaint of   Chief Complaint   Patient presents with   • left big toe pain/draining pus from side of nail       HPI     Theresa presents for toe pain. Notes a 2 week history of toe \"soreness\" and then noticed when she was looking at her printer last night that it looked very red and swollen. She then pushed on it and noted \"pus\" that was grey in appearance came out of it. Denies any improvement in erythema or pain following this. No difficulty with bearing weight including with physical activity. No pain with weight-bearing at all. Never had any difficulties like this in the past. No known injuries or recent trauma she is aware of. No known recent laceration or ingrown toenails. Last went to the nail salon 4 weeks ago, does go for pedicures regularly.    The following portions of the patient's history were reviewed and updated as appropriate: allergies, current medications, past family history, past medical history, past social history, past surgical history and problem list.    Allergies: Prednisone    Current Outpatient Medications:   •  citalopram (CeleXA) 20 MG tablet, Take 1 tablet by mouth Daily., Disp: 90 tablet, Rfl: 3  •  melatonin 5 MG tablet tablet, Take 5 mg by mouth., Disp: , Rfl:   •  metoclopramide (REGLAN) 10 MG tablet, Take 10 mg by mouth., Disp: , Rfl:   •  naproxen sodium (ALEVE) 220 MG tablet, Take 220 mg by mouth 2 (Two) Times a Day As Needed., Disp: , Rfl:   •  ondansetron ODT (Zofran ODT) 4 MG disintegrating tablet, Place 1 tablet on the tongue Every 8 (Eight) Hours As Needed for Nausea or Vomiting., Disp: 15 tablet, Rfl: 0  •  propranolol (INDERAL) 20 MG tablet, , Disp: , Rfl:   •  propranolol LA (Inderal LA) 80 MG 24 hr capsule, Take 1 capsule by mouth Daily. (Patient taking differently: Take 40 mg by mouth Daily.), Disp: 30 capsule, Rfl: 0  •  rizatriptan (MAXALT) 10 MG tablet, Take 10 mg by mouth., Disp: , Rfl:   •  " "zonisamide (ZONEGRAN) 100 MG capsule, , Disp: , Rfl:   •  sulfamethoxazole-trimethoprim (Bactrim DS) 800-160 MG per tablet, Take 1 tablet by mouth 2 (Two) Times a Day for 7 days., Disp: 14 tablet, Rfl: 0  Medications Discontinued During This Encounter   Medication Reason   • Vortioxetine HBr 10 MG tablet *Therapy completed   • metoclopramide (REGLAN) 10 MG tablet *Therapy completed       Review of Systems   Constitutional: Negative for chills and fatigue.   Musculoskeletal: Negative for gait problem.   Skin: Positive for wound. Negative for color change and rash.             /78 (BP Location: Left arm, Patient Position: Sitting, Cuff Size: Adult)   Pulse 95   Temp 98 °F (36.7 °C)   Resp 14   Ht 154.9 cm (60.98\")   Wt 104 kg (229 lb)   LMP 02/23/2021   SpO2 96%   BMI 43.29 kg/m²       Physical Exam  Vitals reviewed.   Constitutional:       Appearance: She is well-developed.   HENT:      Head: Normocephalic.      Right Ear: External ear normal.      Left Ear: External ear normal.      Nose: Nose normal.      Mouth/Throat:      Mouth: Mucous membranes are moist.   Eyes:      Pupils: Pupils are equal, round, and reactive to light.   Cardiovascular:      Rate and Rhythm: Normal rate and regular rhythm.      Heart sounds: Normal heart sounds.   Pulmonary:      Effort: Pulmonary effort is normal. No respiratory distress.      Breath sounds: Normal breath sounds.   Musculoskeletal:         General: Tenderness (Mild erythema and tenderness localized to the medial aspect of the left great toe just medial to the nailbed. mild fluctuance w/o warmth appreciated ) present. Normal range of motion.      Cervical back: Normal range of motion and neck supple.   Skin:     General: Skin is warm and dry.      Capillary Refill: Capillary refill takes less than 2 seconds.   Neurological:      General: No focal deficit present.      Mental Status: She is alert.   Psychiatric:         Mood and Affect: Mood normal.         " Behavior: Behavior normal.         Lab Results (most recent)     None          Results for orders placed or performed in visit on 10/06/20   POC Pregnancy, Urine    Specimen: Urine   Result Value Ref Range    HCG, Urine, QL Negative Negative    Lot Number [id number]     Internal Positive Control Positive     Internal Negative Control Negative    Reference Histopathology    Specimen: Cervix; Tissue   Result Value Ref Range    Conv .conv Comment     . Comment     . Comment     . Comment     . Comment     . Comment            Diagnoses and all orders for this visit:    1. Paronychia of toe of left foot due to ingrown toenail (Primary)  -     sulfamethoxazole-trimethoprim (Bactrim DS) 800-160 MG per tablet; Take 1 tablet by mouth 2 (Two) Times a Day for 7 days.  Dispense: 14 tablet; Refill: 0          Return if symptoms worsen or fail to improve.    Jason Hernandez MD  Internal Medicine-Pediatrics, PGY-3    I have seen and examined the patient independently.  Agree with above.

## 2021-04-06 RX ORDER — ONDANSETRON 4 MG/1
TABLET, ORALLY DISINTEGRATING ORAL
Qty: 15 TABLET | Refills: 0 | Status: SHIPPED | OUTPATIENT
Start: 2021-04-06 | End: 2021-09-21 | Stop reason: SDUPTHER

## 2021-04-26 ENCOUNTER — HOSPITAL ENCOUNTER (OUTPATIENT)
Dept: ULTRASOUND IMAGING | Facility: HOSPITAL | Age: 31
Discharge: HOME | End: 2021-04-26
Admitting: SURGERY

## 2021-04-26 DIAGNOSIS — N63.0 LUMP OR MASS IN BREAST: ICD-10-CM

## 2021-04-26 PROCEDURE — 76642 ULTRASOUND BREAST LIMITED: CPT

## 2021-05-03 ENCOUNTER — OFFICE VISIT (OUTPATIENT)
Dept: SURGERY | Facility: CLINIC | Age: 31
End: 2021-05-03

## 2021-05-03 VITALS
OXYGEN SATURATION: 98 % | SYSTOLIC BLOOD PRESSURE: 115 MMHG | BODY MASS INDEX: 43.23 KG/M2 | HEART RATE: 117 BPM | DIASTOLIC BLOOD PRESSURE: 81 MMHG | WEIGHT: 229 LBS | HEIGHT: 61 IN

## 2021-05-03 DIAGNOSIS — N63.0 LUMP OR MASS IN BREAST: Primary | ICD-10-CM

## 2021-05-03 DIAGNOSIS — E66.01 MORBID OBESITY (HCC): ICD-10-CM

## 2021-05-03 DIAGNOSIS — N60.11 FIBROCYSTIC BREAST CHANGES, BILATERAL: ICD-10-CM

## 2021-05-03 DIAGNOSIS — N60.12 FIBROCYSTIC BREAST CHANGES, BILATERAL: ICD-10-CM

## 2021-05-03 PROBLEM — N63.20 LEFT BREAST MASS: Status: RESOLVED | Noted: 2020-06-18 | Resolved: 2021-05-03

## 2021-05-03 PROCEDURE — 99213 OFFICE O/P EST LOW 20 MIN: CPT | Performed by: NURSE PRACTITIONER

## 2021-05-03 NOTE — PROGRESS NOTES
Chief Complaint: Theresa Dyson is a 30 y.o. female who was seen for breast mass follow up.    History of Present Illness:  10/26/2020:  Patient presents with breast mass.  She had a breast exam by her gynecologist in 2020 at her 4-week checkup after her Kyleena was placed.  At that visit Dr. Soria noticed a left breast nodularity.  She saw her in follow-up in  and noted that the nodularity had not improved so arranged for her to see us.    She noted no other new masses, skin changes, nipple discharge, nipple changes prior to her most recent imaging.  Her most recent imaging includes the followin2020 [facility]  Radiology  Theresa Dyson  US LEFT BREAST  One week history of palpable nontender lump in the lower midline left breast.  Normal ultrasound examination of the left breast.    She has not had a breast biopsy in the past.  She has her uterus and ovaries, is premenopausal, and has not been taking birth control since her Kyleena was removed in 2020.  This was removed due to worsening headaches.  She tells me that her headaches have not improved since the Kyleena was removed.  Her family history includes the following: She has no sisters, one maternal aunt, one paternal aunt.  No family history of breast or ovarian cancer.    He has gained 35 pounds since 2017 when she quit smoking.  She is here for evaluation.  At that visit with exam: Left breast: There is a palpable area of spindle-shaped dense breast tissue at the 6:00, 2.5 cm from the nipple of area of interest to the patient.  Smoothly marginated. This is clinically consistent with superficial and dense breast tissue.  We reviewed her history, imaging, imaging reports, bedside ultrasound examination together today.  I am reassured by her imaging and examination.  I do believe that this is an area of dense breast tissue that is palpable.  On her bedside ultrasound today dense fibroglandular tissue is seen at the  area of palpable finding. No abnormal solid or cystic masses seen.    So that we can have at least 1 year of clinical follow-up, I have ordered a left breast ultrasound at [facility]    Interval History:  She returns today for follow up with unchanged left breast nodularity, no breast pain except cyclic tenderness.    Left breast US on 2021: BiRads 1, negative (see full report below)    Review of Systems:  Review of Systems   Constitutional: Positive for fatigue and unexpected weight change (30 LBS OVER LAST 2 YRS).   Endocrine: Positive for hot flashes.   Neurological: Positive for dizziness, headaches and light-headedness.   Psychiatric/Behavioral: Positive for decreased concentration. The patient is nervous/anxious.    All other systems reviewed and are negative.       Past Medical and Surgical History:  Breast Biopsy History:  Patient has not had a breast biopsy in the past.  Breast Cancer HIstory:  Patient does not have a past medical history of breast cancer.  Breast Operations, and year:  NONE   Obstetric/Gynecologic History:  Age menstrual periods began: 13  Number of pregnancies:1  Number of live births: 1  Number of abortions or miscarriages: 0  Age of delivery of first child: 18  Patient did not breast feed.  Length of time taking birth control pills: 1 YR  Patient has never taken hormone replacement  PATIENT HAS BOTH OVARIES AND UTERUS.     Past Surgical History:   Procedure Laterality Date   • ADENOIDECTOMY     •  SECTION     • CHOLECYSTECTOMY     • MIDDLE EAR SURGERY     • MYRINGOTOMY W/ TUBES     • TONSILLECTOMY       Past Medical History:   Diagnosis Date   • Allergic rhinitis    • Anxiety    • Carpal tunnel syndrome, right upper limb    • Chronic migraine without aura, not intractable    • GERD without esophagitis    • Insomnia    • Panic disorder without agoraphobia        Prior Hospitalizations, other than for surgery or childbirth, and year:  NONE     Social History  "    Socioeconomic History   • Marital status: Single     Spouse name: Not on file   • Number of children: Not on file   • Years of education: Not on file   • Highest education level: Not on file   Tobacco Use   • Smoking status: Former Smoker     Packs/day: 0.00     Years: 0.50     Pack years: 0.00     Quit date: 2/8/2018     Years since quitting: 3.2   • Smokeless tobacco: Never Used   • Tobacco comment: quit 3 months ago   Substance and Sexual Activity   • Alcohol use: Yes     Comment: caffeine use   • Drug use: No   • Sexual activity: Yes     Partners: Male     Birth control/protection: I.U.D.     Patient is .  Patient is employed full time with the following occupation:   Patient drinks 1 servings of caffeine per day.    Family History:  Family History   Problem Relation Age of Onset   • Hypertension Mother    • Heart failure Father    • Heart disease Father    • Hypertension Father    • Diabetes Paternal Aunt    • Diabetes Paternal Uncle    • Stroke Maternal Grandmother    • Heart disease Paternal Grandfather    • Diabetes Paternal Uncle        Vital Signs:  /81 (BP Location: Right arm)   Pulse 117   Ht 154.9 cm (61\")   Wt 104 kg (229 lb)   SpO2 98%   BMI 43.27 kg/m²      Medications:    Current Outpatient Medications:   •  melatonin 5 MG tablet tablet, Take 5 mg by mouth., Disp: , Rfl:   •  metoclopramide (REGLAN) 10 MG tablet, Take 10 mg by mouth., Disp: , Rfl:   •  naproxen sodium (ALEVE) 220 MG tablet, Take 220 mg by mouth 2 (Two) Times a Day As Needed., Disp: , Rfl:   •  ondansetron ODT (ZOFRAN-ODT) 4 MG disintegrating tablet, DISSOLVE ONE TABLET ON THE TONGUE EVERY 8 HOURS AS NEEDED FOR NAUSEA OR VOMITING, Disp: 15 tablet, Rfl: 0  •  propranolol (INDERAL) 20 MG tablet, , Disp: , Rfl:   •  rizatriptan (MAXALT) 10 MG tablet, Take 10 mg by mouth., Disp: , Rfl:   •  zonisamide (ZONEGRAN) 100 MG capsule, , Disp: , Rfl:      Allergies:  Allergies   Allergen Reactions   • " "Prednisone Hives       Physical Examination:  /81 (BP Location: Right arm)   Pulse 117   Ht 154.9 cm (61\")   Wt 104 kg (229 lb)   SpO2 98%   BMI 43.27 kg/m²   General Appearance:  Patient is in no distress.  She is well kept and has an morbidly obese build.   Psychiatric:  Patient with appropriate mood and affect. Alert and oriented to self, time, and place.    Breast, RIGHT:  small sized, asymmetric with the contralateral side, right > left.  Breast skin is without erythema, edema, rashes.  There are no visible abnormalities upon inspection during the arm-raising maneuver or with hands on hips in the sitting position. There is no nipple retraction, discharge or nipple/areolar skin changes.There are no masses palpable in the sitting or supine positions.    Breast, LEFT:  small sized,asymmetric with the contralateral side, right > left.  Breast skin is without erythema, edema, rashes.  There are no visible abnormalities upon inspection during the arm-raising maneuver or with hands on hips in the sitting position. There is no nipple retraction, discharge or nipple/areolar skin changes.   There is a palpable area of spindle-shaped dense breast tissue at the 6:00, 2.5 cm from the nipple of area of interest to the patient.  Smoothly marginated. This is clinically consistent with superficial and dense breast tissue and a stable finding.  There are no other masses palpable in the sitting or supine positions.    Lymphatic:  There is no axillary, cervical, infraclavicular, or supraclavicular adenopathy bilaterally.  Musculoskeletal:  Good strength in all 4 extremities.   There is good range of motion in both shoulders.    Skin:  No new skin lesions or rashes on the skin excluding the breast (see breast exam above).        Imagin2021: left limited US at [facility]  CLINICAL INDICATION: 30 years old woman presents for diagnostic evaluation of a palpable left breast lump, which she states may have gotten " slightly larger since it was previously evaluated in March 2020.      COMPARISON: Limited left breast ultrasound 3/3/2020     FINDINGS:  Targeted sonographic evaluation of the left breast was performed at 6:00 in the area of concern indicated by the patient. Fat and areas of dense fibroglandular tissue are identified without a suspicious cystic or solid mass.        IMPRESSION:  Fat and dense fibroglandular tissue are identified in the area of concern indicated by the patient at 6:00 in the left breast. Further management should be based on clinical assessment. Recommend annual bilateral screening mammogram beginning at age 40 or sooner as directed by clinical risk assessment.     BI-RADS Category 1: Negative     This report was finalized on 4/26/2021 10:13 AM by Dr. Taina Bruce M.D.        Procedures:      Assessment:  1- left breast mass, stable with negative imaging    Left limited US 4/2021, negative BiRads 1    2-Obesity  BMI 43    3-fibrocystic breast changes  cyclical    Plan:    We reviewed her history, imaging, imaging reports together today.  I am reassured by her imaging and examination.  I do believe that this is an area of dense breast tissue that is palpable.    Her imaging is now stable for one year, exam stable as well.  No further imaging or clinical follow up is recommended at this time, patient is in agreement with this plan.    We also discussed her breast discomfort that is cyclical.  This is separate from the above discomfort associated with manipulating the area of interest.  She tells me that this cyclical breast tenderness is not bothersome to her.  We discussed that if it became bothersome to her that we have supplements that can help.  At this time she was not interested.    I asked Theresa to continue her self breast exam and to call us in the interim with concerns would be happy to see her back as necessary.  She will start her screening mammogram when age appropriate.      Brooke Matos  KEVEN Simpson      CC:    Dr Yang Grady/transcription disclaimer:    Much of this encounter note is an electronic transcription/translocation of spoken language to printed text.  The electronic translation of spoken language may permit erroneous, or at times, nonsensical words or phrases to be inadvertently transcribed.  Although I have reviewed the note from such areas, some may still exist.

## 2021-05-11 ENCOUNTER — OFFICE VISIT (OUTPATIENT)
Dept: OBSTETRICS AND GYNECOLOGY | Facility: CLINIC | Age: 31
End: 2021-05-11

## 2021-05-11 VITALS
WEIGHT: 233.2 LBS | BODY MASS INDEX: 44.03 KG/M2 | HEIGHT: 61 IN | DIASTOLIC BLOOD PRESSURE: 88 MMHG | SYSTOLIC BLOOD PRESSURE: 124 MMHG

## 2021-05-11 DIAGNOSIS — Z01.419 ENCOUNTER FOR GYNECOLOGICAL EXAMINATION: Primary | ICD-10-CM

## 2021-05-11 DIAGNOSIS — N63.0 LUMP OR MASS IN BREAST: ICD-10-CM

## 2021-05-11 DIAGNOSIS — Z13.9 SCREENING FOR CONDITION: ICD-10-CM

## 2021-05-11 DIAGNOSIS — N87.9 CERVICAL DYSPLASIA: ICD-10-CM

## 2021-05-11 DIAGNOSIS — Z01.419 PAP SMEAR, LOW-RISK: ICD-10-CM

## 2021-05-11 DIAGNOSIS — Z11.51 SPECIAL SCREENING EXAMINATION FOR HUMAN PAPILLOMAVIRUS (HPV): ICD-10-CM

## 2021-05-11 PROCEDURE — 99395 PREV VISIT EST AGE 18-39: CPT | Performed by: OBSTETRICS & GYNECOLOGY

## 2021-05-11 RX ORDER — NORETHINDRONE ACETATE AND ETHINYL ESTRADIOL 1MG-20(21)
1 KIT ORAL DAILY
Qty: 84 TABLET | Refills: 3 | Status: SHIPPED | OUTPATIENT
Start: 2021-05-11 | End: 2022-03-04 | Stop reason: SDUPTHER

## 2021-05-11 NOTE — PROGRESS NOTES
GYN Annual Exam     CC- Here for annual exam.     Theresa Dyson is a 31 y.o. female who presents for annual well woman exam. Periods are regular every 28-30 days, lasting 3 days. Dysmenorrhea:none. Cyclic symptoms include none. No intermenstrual bleeding, spotting, or discharge.  Patient is sexually active  yes - engaged . Patient is satisfied with her contraception no. Family Planning. Coitus interruptus. Pt has a left breast mass. She recently had appt at Dr Zapien's office. US of breast negative 3/2021.    OB History        1    Para   1    Term   1            AB        Living   1       SAB        TAB        Ectopic        Molar        Multiple        Live Births   1                Current contraception: coitus interruptus  History of abnormal Pap smear: Cervical dysplasia: LPS 2020 LGSIL. Colpo with biopsy 2020 CIN1. 2020 LGSIL. 10/2020 negative colpo.  History of abnormal mammogram: no  Family history of uterine, colon or ovarian cancer: no  Family history of breast cancer: no    Health Maintenance   Topic Date Due   • Annual Gynecologic Pelvic and Breast Exam  2021   • INFLUENZA VACCINE  2021   • ANNUAL PHYSICAL  2021   • PAP SMEAR  2023   • TDAP/TD VACCINES (2 - Td) 2030   • HEPATITIS C SCREENING  Completed   • COVID-19 Vaccine  Completed   • Pneumococcal Vaccine 0-64  Aged Out       Past Medical History:   Diagnosis Date   • Allergic rhinitis    • Anxiety    • Carpal tunnel syndrome, right upper limb    • Chronic migraine without aura, not intractable    • GERD without esophagitis    • Insomnia    • Panic disorder without agoraphobia        Past Surgical History:   Procedure Laterality Date   • ADENOIDECTOMY     •  SECTION     • CHOLECYSTECTOMY     • MIDDLE EAR SURGERY     • MYRINGOTOMY W/ TUBES     • TONSILLECTOMY           Current Outpatient Medications:   •  melatonin 5 MG tablet tablet, Take 5 mg by mouth., Disp: , Rfl:   •  metoclopramide  "(REGLAN) 10 MG tablet, Take 10 mg by mouth., Disp: , Rfl:   •  naproxen sodium (ALEVE) 220 MG tablet, Take 220 mg by mouth 2 (Two) Times a Day As Needed., Disp: , Rfl:   •  norethindrone-ethinyl estradiol FE (Junel FE 1/20) 1-20 MG-MCG per tablet, Take 1 tablet by mouth Daily., Disp: 84 tablet, Rfl: 3  •  ondansetron ODT (ZOFRAN-ODT) 4 MG disintegrating tablet, DISSOLVE ONE TABLET ON THE TONGUE EVERY 8 HOURS AS NEEDED FOR NAUSEA OR VOMITING, Disp: 15 tablet, Rfl: 0  •  propranolol (INDERAL) 20 MG tablet, , Disp: , Rfl:   •  rizatriptan (MAXALT) 10 MG tablet, Take 10 mg by mouth., Disp: , Rfl:   •  zonisamide (ZONEGRAN) 100 MG capsule, , Disp: , Rfl:     Allergies   Allergen Reactions   • Prednisone Hives       Social History     Tobacco Use   • Smoking status: Former Smoker     Packs/day: 0.00     Years: 0.50     Pack years: 0.00     Quit date: 2/8/2018     Years since quitting: 3.2   • Smokeless tobacco: Never Used   • Tobacco comment: quit 3 months ago   Substance Use Topics   • Alcohol use: Yes     Comment: caffeine use   • Drug use: No       Family History   Problem Relation Age of Onset   • Hypertension Mother    • Heart failure Father    • Heart disease Father    • Hypertension Father    • Diabetes Paternal Aunt    • Diabetes Paternal Uncle    • Stroke Maternal Grandmother    • Heart disease Paternal Grandfather    • Diabetes Paternal Uncle        Review of Systems   Constitutional: Negative for appetite change, chills, fatigue, fever and unexpected weight change.   Gastrointestinal: Negative for abdominal distention, abdominal pain, anal bleeding, blood in stool, constipation, diarrhea, nausea and vomiting.   Genitourinary: Negative for dyspareunia, dysuria, menstrual problem, pelvic pain, vaginal bleeding, vaginal discharge and vaginal pain.       /88   Ht 154.9 cm (61\")   Wt 106 kg (233 lb 3.2 oz)   LMP 04/18/2021 (Exact Date)   BMI 44.06 kg/m²     Physical Exam  Vitals reviewed. Exam conducted " with a chaperone present.   Constitutional:       Appearance: She is well-developed.   HENT:      Mouth/Throat:      Dentition: Normal dentition. No dental caries.   Cardiovascular:      Rate and Rhythm: Normal rate and regular rhythm.      Heart sounds: Normal heart sounds.   Pulmonary:      Effort: Pulmonary effort is normal. No respiratory distress.      Breath sounds: Normal breath sounds. No stridor. No wheezing.   Chest:      Breasts:         Right: No inverted nipple, mass, nipple discharge, skin change or tenderness.         Left: Mass present. No inverted nipple, nipple discharge, skin change or tenderness.          Comments: 1-2cm left breast mass  Abdominal:      General: There is no distension.      Palpations: Abdomen is soft. There is no mass.      Tenderness: There is no abdominal tenderness.   Genitourinary:     Labia:         Right: No rash, tenderness or lesion.         Left: No rash, tenderness or lesion.       Vagina: No vaginal discharge, tenderness or bleeding.      Cervix: No cervical motion tenderness, discharge or friability.      Uterus: Not deviated, not enlarged, not fixed and not tender.       Adnexa:         Right: No mass, tenderness or fullness.          Left: No mass, tenderness or fullness.     Musculoskeletal:         General: No tenderness. Normal range of motion.   Skin:     General: Skin is warm.      Findings: No erythema or rash.   Neurological:      Mental Status: She is alert and oriented to person, place, and time.      Cranial Nerves: No cranial nerve deficit.      Coordination: Coordination normal.   Psychiatric:         Behavior: Behavior normal.         Thought Content: Thought content normal.         Judgment: Judgment normal.            Assessment/Plan    1) GYN HM: check pap smear. SBE demonstrated and encouraged. Hx of cervical dysplasia: LPS LGSIL and colpo negative. Repeat pap smear today.  2) STD screening: declines  3) Contraception: Start OCPs  4) Family  Planning: interested in children approx 1 year.  5) Diet and Exercise discussed  6) Smoking Status: nonsmoker  7) Social: getting  in September. Honeymoon in [place].   8) left breast mass: US negative 3/2020 and 4/2021. Pt had a fu appt with Dr Zapien's office 5/3/2021 and no additional imagine or fu needed.  9) Follow up prn and 1 year       Diagnoses and all orders for this visit:    Encounter for gynecological examination    Screening for condition  -     POC Urinalysis Dipstick  -     POC Pregnancy, Urine    Special screening examination for human papillomavirus (HPV)  -     IGP, Rfx Aptima HPV ASCU    Pap smear, low-risk  -     IGP, Rfx Aptima HPV ASCU    Cervical dysplasia    Lump or mass in breast    Other orders  -     norethindrone-ethinyl estradiol FE (Junel FE 1/20) 1-20 MG-MCG per tablet; Take 1 tablet by mouth Daily.          Estella Soria DO  5/12/2021  14:15 EDT

## 2021-06-08 ENCOUNTER — OFFICE VISIT (OUTPATIENT)
Dept: OBSTETRICS AND GYNECOLOGY | Facility: CLINIC | Age: 31
End: 2021-06-08

## 2021-06-08 VITALS
BODY MASS INDEX: 44.07 KG/M2 | WEIGHT: 233.4 LBS | DIASTOLIC BLOOD PRESSURE: 76 MMHG | SYSTOLIC BLOOD PRESSURE: 124 MMHG | HEIGHT: 61 IN

## 2021-06-08 DIAGNOSIS — Z13.9 SCREENING FOR CONDITION: ICD-10-CM

## 2021-06-08 DIAGNOSIS — N87.9 CERVICAL DYSPLASIA: Primary | ICD-10-CM

## 2021-06-08 LAB
B-HCG UR QL: NEGATIVE
INTERNAL NEGATIVE CONTROL: NORMAL
INTERNAL POSITIVE CONTROL: NORMAL
Lab: NORMAL

## 2021-06-08 PROCEDURE — 57455 BIOPSY OF CERVIX W/SCOPE: CPT | Performed by: OBSTETRICS & GYNECOLOGY

## 2021-06-08 PROCEDURE — 81025 URINE PREGNANCY TEST: CPT | Performed by: OBSTETRICS & GYNECOLOGY

## 2021-06-08 NOTE — PROGRESS NOTES
Colposcopy Procedure Note    Chief Complaint: LGSIL- cervical dysplasia since 2/2020.    Colposcopy    Date/Time: 6/8/2021 1:13 PM  Performed by: Estella Soria DO  Authorized by: Estella Soria DO   Preparation: Patient was prepped and draped in the usual sterile fashion.  Local anesthesia used: no    Anesthesia:  Local anesthesia used: no    Sedation:  Patient sedated: no    Patient tolerance: patient tolerated the procedure well with no immediate complications        Indications: Most recent Pap smear showed: low-grade squamous intraepithelial neoplasia (LGSIL - encompassing HPV,mild dysplasia,DEBO I).  Prior cervical/vaginal disease: DEBO 1.  Prior cervical treatment: no treatment.    Procedure Details   The risks and benefits of the procedure and Verbal informed consent obtained.    Speculum placed in vagina and excellent visualization of cervix achieved, cervix swabbed x 3 with acetic acid solution and Lugol's solution     Findings:  Cervix: acetowhite lesion(s) noted at 12 o'clock; cervical biopsies taken at 12 o'clock, specimen labelled and sent to pathology and hemostasis achieved with Monsel's solution.  Vaginal inspection: vaginal colposcopy not performed.  Vulvar colposcopy: vulvar colposcopy not performed.    Specimens: cervical biopsy x1    Complications: none.    Plan:  Specimens labelled and sent to Pathology.  Will base further treatment on Pathology findings.  Treatment options discussed with patient.    Estella Soria DO   6/8/2021  13:12 EDT

## 2021-06-10 LAB
DX ICD CODE: NORMAL
PATH REPORT.FINAL DX SPEC: NORMAL
PATH REPORT.GROSS SPEC: NORMAL
PATH REPORT.RELEVANT HX SPEC: NORMAL
PATH REPORT.SITE OF ORIGIN SPEC: NORMAL
PATHOLOGIST NAME: NORMAL
PAYMENT PROCEDURE: NORMAL

## 2021-07-09 ENCOUNTER — TELEPHONE (OUTPATIENT)
Dept: INTERNAL MEDICINE | Facility: CLINIC | Age: 31
End: 2021-07-09

## 2021-07-09 NOTE — TELEPHONE ENCOUNTER
Check finger for any bites or lesions.  Would ice and elevate.  Take zyrtec if any bug bites.  Can check Monday if not improved

## 2021-07-09 NOTE — TELEPHONE ENCOUNTER
----- Message from Víctor Dalal MA sent at 7/9/2021  8:28 AM EDT -----  Regarding: FW: Non-Urgent Medical Question  Contact: [phone]      ----- Message -----  From: Theresa Dyson  Sent: 7/9/2021   8:11 AM EDT  To: [facility]  Subject: Non-Urgent Medical Question                      Hello,  Last night I started experiencing extreme soreness and pain in my left ring finger. It’s slighlty swollen, enough where I cannot wear my wedding ring. The soreness is less this morning but it still hurts, and still swollen.

## 2021-08-20 ENCOUNTER — OFFICE VISIT (OUTPATIENT)
Dept: INTERNAL MEDICINE | Facility: CLINIC | Age: 31
End: 2021-08-20

## 2021-08-20 VITALS
HEIGHT: 61 IN | RESPIRATION RATE: 19 BRPM | HEART RATE: 97 BPM | OXYGEN SATURATION: 98 % | BODY MASS INDEX: 43.39 KG/M2 | WEIGHT: 229.8 LBS | TEMPERATURE: 96.2 F | DIASTOLIC BLOOD PRESSURE: 80 MMHG | SYSTOLIC BLOOD PRESSURE: 124 MMHG

## 2021-08-20 DIAGNOSIS — R51.9 ACUTE NONINTRACTABLE HEADACHE, UNSPECIFIED HEADACHE TYPE: ICD-10-CM

## 2021-08-20 DIAGNOSIS — R11.0 NAUSEA: Primary | ICD-10-CM

## 2021-08-20 DIAGNOSIS — F41.9 ANXIETY: ICD-10-CM

## 2021-08-20 DIAGNOSIS — Z00.00 HEALTHCARE MAINTENANCE: ICD-10-CM

## 2021-08-20 DIAGNOSIS — R53.83 FATIGUE, UNSPECIFIED TYPE: ICD-10-CM

## 2021-08-20 PROCEDURE — 99214 OFFICE O/P EST MOD 30 MIN: CPT | Performed by: INTERNAL MEDICINE

## 2021-08-20 RX ORDER — BUPROPION HYDROCHLORIDE 150 MG/1
150 TABLET ORAL DAILY
Qty: 30 TABLET | Refills: 0 | Status: SHIPPED | OUTPATIENT
Start: 2021-08-20 | End: 2021-09-08

## 2021-08-20 NOTE — PROGRESS NOTES
Theresa Dyson is a 31 y.o. female, who presents with a chief complaint of   Chief Complaint   Patient presents with   • Fatigue           HPI   Pt here bc of severe fatigue this week.  She has had more headaches.  She sees neurology and has been getting botox for this.  She has been more nausea.  She has taken home pregnancy tests (4 yesterday) and all neg.  She is on OCP.  She took  Plan B a few months ago so last period was light bleeding at beginning of august.      Anxiety - Pt was seeing a therapist and was taking trintellix but it made her have nausea.  She would like to try wellbutrin.      The following portions of the patient's history were reviewed and updated as appropriate: allergies, current medications, past family history, past medical history, past social history, past surgical history and problem list.    Allergies: Prednisone    Review of Systems   Constitutional: Negative.    HENT: Negative.    Eyes: Negative.    Respiratory: Negative.    Cardiovascular: Negative.    Gastrointestinal: Negative.    Endocrine: Negative.    Genitourinary: Negative.    Musculoskeletal: Negative.    Skin: Negative.    Allergic/Immunologic: Negative.    Neurological: Negative.    Hematological: Negative.    Psychiatric/Behavioral: Negative.    All other systems reviewed and are negative.            Wt Readings from Last 3 Encounters:   08/20/21 104 kg (229 lb 12.8 oz)   06/08/21 106 kg (233 lb 6.4 oz)   05/11/21 106 kg (233 lb 3.2 oz)     Temp Readings from Last 3 Encounters:   08/20/21 96.2 °F (35.7 °C) (Temporal)   03/08/21 98 °F (36.7 °C)   12/02/20 97.8 °F (36.6 °C) (Temporal)     BP Readings from Last 3 Encounters:   08/20/21 124/80   06/08/21 124/76   05/11/21 124/88     Pulse Readings from Last 3 Encounters:   08/20/21 97   05/03/21 117   03/08/21 95     Body mass index is 43.44 kg/m².  SpO2 Readings from Last 3 Encounters:   08/20/21 98%   05/03/21 98%   03/08/21 96%          Physical Exam  Vitals and  nursing note reviewed.   Constitutional:       General: She is not in acute distress.     Appearance: She is well-developed.   HENT:      Head: Normocephalic and atraumatic.      Right Ear: External ear normal.      Left Ear: External ear normal.      Nose: Nose normal.   Eyes:      Conjunctiva/sclera: Conjunctivae normal.      Pupils: Pupils are equal, round, and reactive to light.   Cardiovascular:      Rate and Rhythm: Normal rate and regular rhythm.      Heart sounds: Normal heart sounds.   Pulmonary:      Effort: Pulmonary effort is normal. No respiratory distress.      Breath sounds: Normal breath sounds. No wheezing.   Musculoskeletal:         General: Normal range of motion.      Cervical back: Normal range of motion and neck supple.      Comments: Normal gait   Skin:     General: Skin is warm and dry.   Neurological:      Mental Status: She is alert and oriented to person, place, and time.   Psychiatric:         Behavior: Behavior normal.         Thought Content: Thought content normal.         Judgment: Judgment normal.         Results for orders placed or performed in visit on 06/08/21   POC Pregnancy, Urine    Specimen: Urine   Result Value Ref Range    HCG, Urine, QL Negative Negative    Lot Number [id number]     Internal Positive Control Passed Passed    Internal Negative Control Passed Passed   Reference Histopathology    Specimen: Cervix; Tissue   Result Value Ref Range    Conv .conv Comment     . Comment     . Comment     . Comment     . Comment     . Comment     . Comment      Result Review :                  Assessment and Plan    Diagnoses and all orders for this visit:    1. Nausea (Primary)  -     Comprehensive Metabolic Panel  -     CBC & Differential  -     T4, Free  -     TSH  -     hCG, Quantitative, Pregnancy  -     Vitamin B12  -     COVID-19,[facility] ROUTINE, NP/OP SWAB IN TRANSPORT MEDIA OR ESWAB 72 HR TAT - Swab, Nasopharynx    2. Fatigue, unspecified type  -     Comprehensive Metabolic  Panel  -     CBC & Differential  -     T4, Free  -     TSH  -     hCG, Quantitative, Pregnancy  -     Vitamin B12  -     COVID-19,[facility] ROUTINE, NP/OP SWAB IN TRANSPORT MEDIA OR ESWAB 72 HR TAT - Swab, Nasopharynx    3. Healthcare maintenance  -     Comprehensive Metabolic Panel  -     CBC & Differential  -     T4, Free  -     TSH  -     Lipid Panel With LDL / HDL Ratio  -     Hemoglobin A1c  -     hCG, Quantitative, Pregnancy  -     Vitamin B12  -     COVID-19,[facility] ROUTINE, NP/OP SWAB IN TRANSPORT MEDIA OR ESWAB 72 HR TAT - Swab, Nasopharynx    4. Acute nonintractable headache, unspecified headache type  -     COVID-19,[facility] ROUTINE, NP/OP SWAB IN TRANSPORT MEDIA OR ESWAB 72 HR TAT - Swab, Nasopharynx    5. Anxiety  -     buPROPion XL (Wellbutrin XL) 150 MG 24 hr tablet; Take 1 tablet by mouth Daily.  Dispense: 30 tablet; Refill: 0               Outpatient Medications Prior to Visit   Medication Sig Dispense Refill   • melatonin 5 MG tablet tablet Take 5 mg by mouth.     • metoclopramide (REGLAN) 10 MG tablet Take 10 mg by mouth.     • naproxen sodium (ALEVE) 220 MG tablet Take 220 mg by mouth 2 (Two) Times a Day As Needed.     • norethindrone-ethinyl estradiol FE (Junel FE 1/20) 1-20 MG-MCG per tablet Take 1 tablet by mouth Daily. 84 tablet 3   • ondansetron ODT (ZOFRAN-ODT) 4 MG disintegrating tablet DISSOLVE ONE TABLET ON THE TONGUE EVERY 8 HOURS AS NEEDED FOR NAUSEA OR VOMITING 15 tablet 0   • propranolol (INDERAL) 20 MG tablet      • rizatriptan (MAXALT) 10 MG tablet Take 10 mg by mouth.     • zonisamide (ZONEGRAN) 100 MG capsule        No facility-administered medications prior to visit.     New Medications Ordered This Visit   Medications   • buPROPion XL (Wellbutrin XL) 150 MG 24 hr tablet     Sig: Take 1 tablet by mouth Daily.     Dispense:  30 tablet     Refill:  0     [unfilled]  There are no discontinued medications.      Return in about 1 month (around 9/20/2021) for Recheck.    Patient was  given instructions and counseling regarding her condition or for health maintenance advice. Please see specific information pulled into the AVS if appropriate.  Answers for HPI/ROS submitted by the patient on 8/20/2021  Please describe your symptoms.: Extreme fatigue, frequent headaches,  Have you had these symptoms before?: Yes  How long have you been having these symptoms?: 1-4 days  Please list any medications you are currently taking for this condition.: Propanol , Junelle , Zoniasmide, Vitamins  What is the primary reason for your visit?: Other

## 2021-08-21 LAB
ALBUMIN SERPL-MCNC: 4.7 G/DL (ref 3.5–5.2)
ALBUMIN/GLOB SERPL: 1.7 G/DL
ALP SERPL-CCNC: 57 U/L (ref 39–117)
ALT SERPL-CCNC: 19 U/L (ref 1–33)
AST SERPL-CCNC: 17 U/L (ref 1–32)
BASOPHILS # BLD AUTO: 0.04 10*3/MM3 (ref 0–0.2)
BASOPHILS NFR BLD AUTO: 0.5 % (ref 0–1.5)
BILIRUB SERPL-MCNC: 0.3 MG/DL (ref 0–1.2)
BUN SERPL-MCNC: 13 MG/DL (ref 6–20)
BUN/CREAT SERPL: 18.3 (ref 7–25)
CALCIUM SERPL-MCNC: 9.9 MG/DL (ref 8.6–10.5)
CHLORIDE SERPL-SCNC: 103 MMOL/L (ref 98–107)
CHOLEST SERPL-MCNC: 161 MG/DL (ref 0–200)
CO2 SERPL-SCNC: 25.9 MMOL/L (ref 22–29)
CREAT SERPL-MCNC: 0.71 MG/DL (ref 0.57–1)
EOSINOPHIL # BLD AUTO: 0.09 10*3/MM3 (ref 0–0.4)
EOSINOPHIL NFR BLD AUTO: 1.1 % (ref 0.3–6.2)
ERYTHROCYTE [DISTWIDTH] IN BLOOD BY AUTOMATED COUNT: 11.9 % (ref 12.3–15.4)
GLOBULIN SER CALC-MCNC: 2.8 GM/DL
GLUCOSE SERPL-MCNC: 90 MG/DL (ref 65–99)
HBA1C MFR BLD: 5.2 % (ref 4.8–5.6)
HCG INTACT+B SERPL-ACNC: <0.5 MIU/ML
HCT VFR BLD AUTO: 42.3 % (ref 34–46.6)
HDLC SERPL-MCNC: 48 MG/DL (ref 40–60)
HGB BLD-MCNC: 13.8 G/DL (ref 12–15.9)
IMM GRANULOCYTES # BLD AUTO: 0.02 10*3/MM3 (ref 0–0.05)
IMM GRANULOCYTES NFR BLD AUTO: 0.2 % (ref 0–0.5)
LABCORP SARS-COV-2, NAA 2 DAY TAT: NORMAL
LDLC SERPL CALC-MCNC: 87 MG/DL (ref 0–100)
LDLC/HDLC SERPL: 1.73 {RATIO}
LYMPHOCYTES # BLD AUTO: 1.83 10*3/MM3 (ref 0.7–3.1)
LYMPHOCYTES NFR BLD AUTO: 22.8 % (ref 19.6–45.3)
MCH RBC QN AUTO: 28.4 PG (ref 26.6–33)
MCHC RBC AUTO-ENTMCNC: 32.6 G/DL (ref 31.5–35.7)
MCV RBC AUTO: 87 FL (ref 79–97)
MONOCYTES # BLD AUTO: 0.43 10*3/MM3 (ref 0.1–0.9)
MONOCYTES NFR BLD AUTO: 5.4 % (ref 5–12)
NEUTROPHILS # BLD AUTO: 5.62 10*3/MM3 (ref 1.7–7)
NEUTROPHILS NFR BLD AUTO: 70 % (ref 42.7–76)
NRBC BLD AUTO-RTO: 0 /100 WBC (ref 0–0.2)
PLATELET # BLD AUTO: 293 10*3/MM3 (ref 140–450)
POTASSIUM SERPL-SCNC: 4.4 MMOL/L (ref 3.5–5.2)
PROT SERPL-MCNC: 7.5 G/DL (ref 6–8.5)
RBC # BLD AUTO: 4.86 10*6/MM3 (ref 3.77–5.28)
SARS-COV-2 RNA RESP QL NAA+PROBE: NOT DETECTED
SODIUM SERPL-SCNC: 142 MMOL/L (ref 136–145)
T4 FREE SERPL-MCNC: 1.22 NG/DL (ref 0.93–1.7)
TRIGL SERPL-MCNC: 149 MG/DL (ref 0–150)
TSH SERPL DL<=0.005 MIU/L-ACNC: 1.99 UIU/ML (ref 0.27–4.2)
VIT B12 SERPL-MCNC: 814 PG/ML (ref 211–946)
VLDLC SERPL CALC-MCNC: 26 MG/DL (ref 5–40)
WBC # BLD AUTO: 8.03 10*3/MM3 (ref 3.4–10.8)

## 2021-09-07 DIAGNOSIS — F41.9 ANXIETY: ICD-10-CM

## 2021-09-08 RX ORDER — BUPROPION HYDROCHLORIDE 150 MG/1
TABLET ORAL
Qty: 30 TABLET | Refills: 0 | Status: SHIPPED | OUTPATIENT
Start: 2021-09-08 | End: 2021-09-21 | Stop reason: SDUPTHER

## 2021-09-21 ENCOUNTER — TELEMEDICINE (OUTPATIENT)
Dept: INTERNAL MEDICINE | Facility: CLINIC | Age: 31
End: 2021-09-21

## 2021-09-21 DIAGNOSIS — F41.9 ANXIETY: Primary | ICD-10-CM

## 2021-09-21 DIAGNOSIS — R11.0 NAUSEA: ICD-10-CM

## 2021-09-21 PROCEDURE — 99214 OFFICE O/P EST MOD 30 MIN: CPT | Performed by: INTERNAL MEDICINE

## 2021-09-21 RX ORDER — BUPROPION HYDROCHLORIDE 300 MG/1
300 TABLET ORAL DAILY
Qty: 90 TABLET | Refills: 1 | Status: SHIPPED | OUTPATIENT
Start: 2021-09-21 | End: 2022-05-10

## 2021-09-21 RX ORDER — ONDANSETRON 4 MG/1
4 TABLET, ORALLY DISINTEGRATING ORAL EVERY 8 HOURS PRN
Qty: 15 TABLET | Refills: 1 | Status: SHIPPED | OUTPATIENT
Start: 2021-09-21

## 2021-09-21 NOTE — PROGRESS NOTES
Theresa Dyson is a 31 y.o. female, who presents with a chief complaint of   Chief Complaint   Patient presents with   • Anxiety   • Fatigue           HPI   This visit has been scheduled as a televisit to comply with patient safety concerns in accordance with CDC recommendations. The patient had issues with technology so the visit was changed from televisit to phone visit.      You have chosen to receive care through a telephone visit. Do you consent to use a telephone visit for your medical care today? Yes    Pt here for f/u.  At her last OV 4 weeks ago she had severe fatigue.  She has had more headaches.  She sees neurology and has been getting botox for this.    home pregnancy test neg.  Cmp, a1c, thyroid testing, lipid, hcg, vit b12, and cbc all normal.  She still c/o nausea and would like a zofran refill.    Anxiety - Pt switched to wellbutrin at last OV. Pt feels like this is working.  It started to kick in during week 2.  She started to have more energy then got more tired again. She is very stressed out bc she is getting  Saturday.      The following portions of the patient's history were reviewed and updated as appropriate: allergies, current medications, past family history, past medical history, past social history, past surgical history and problem list.    Allergies: Prednisone    Review of Systems   Constitutional: Negative.    HENT: Negative.    Eyes: Negative.    Respiratory: Negative.    Cardiovascular: Negative.    Gastrointestinal: Negative.    Endocrine: Negative.    Genitourinary: Negative.    Musculoskeletal: Negative.    Skin: Negative.    Allergic/Immunologic: Negative.    Neurological: Negative.    Hematological: Negative.    Psychiatric/Behavioral: Negative.    All other systems reviewed and are negative.            Wt Readings from Last 3 Encounters:   08/20/21 104 kg (229 lb 12.8 oz)   06/08/21 106 kg (233 lb 6.4 oz)   05/11/21 106 kg (233 lb 3.2 oz)     Temp Readings from  Last 3 Encounters:   08/20/21 96.2 °F (35.7 °C) (Temporal)   03/08/21 98 °F (36.7 °C)   12/02/20 97.8 °F (36.6 °C) (Temporal)     BP Readings from Last 3 Encounters:   08/20/21 124/80   06/08/21 124/76   05/11/21 124/88     Pulse Readings from Last 3 Encounters:   08/20/21 97   05/03/21 117   03/08/21 95     There is no height or weight on file to calculate BMI.  SpO2 Readings from Last 3 Encounters:   08/20/21 98%   05/03/21 98%   03/08/21 96%          Physical Exam  Vitals and nursing note reviewed.   Constitutional:       Appearance: She is well-developed.   HENT:      Head: Normocephalic and atraumatic.      Nose: Nose normal.   Eyes:      General:         Right eye: No discharge.         Left eye: No discharge.      Conjunctiva/sclera: Conjunctivae normal.   Pulmonary:      Effort: Pulmonary effort is normal. No respiratory distress.   Musculoskeletal:      Cervical back: Normal range of motion and neck supple.   Skin:     Findings: No rash.   Neurological:      Mental Status: She is alert and oriented to person, place, and time.   Psychiatric:         Behavior: Behavior normal.         Thought Content: Thought content normal.         Judgment: Judgment normal.         Results for orders placed or performed in visit on 08/20/21   COVID-19,[facility] ROUTINE, NP/OP SWAB IN TRANSPORT MEDIA OR ESWAB 72 HR TAT - Swab, Nasopharynx    Specimen: Nasopharynx; Swab   Result Value Ref Range    SARS-CoV-2, [facility]NA Not Detected Not Detected   SARS-CoV-2, DIANA 2 DAY TAT - ,   Result Value Ref Range    [facility] SARS-COV-2, DIANA 2 DAY TAT Performed    Comprehensive Metabolic Panel    Specimen: Blood   Result Value Ref Range    Glucose 90 65 - 99 mg/dL    BUN 13 6 - 20 mg/dL    Creatinine 0.71 0.57 - 1.00 mg/dL    eGFR Non African Am 97 >60 mL/min/1.73    eGFR African Am 118 >60 mL/min/1.73    BUN/Creatinine Ratio 18.3 7.0 - 25.0    Sodium 142 136 - 145 mmol/L    Potassium 4.4 3.5 - 5.2 mmol/L    Chloride 103 98 - 107 mmol/L    Total  CO2 25.9 22.0 - 29.0 mmol/L    Calcium 9.9 8.6 - 10.5 mg/dL    Total Protein 7.5 6.0 - 8.5 g/dL    Albumin 4.70 3.50 - 5.20 g/dL    Globulin 2.8 gm/dL    A/G Ratio 1.7 g/dL    Total Bilirubin 0.3 0.0 - 1.2 mg/dL    Alkaline Phosphatase 57 39 - 117 U/L    AST (SGOT) 17 1 - 32 U/L    ALT (SGPT) 19 1 - 33 U/L   T4, Free    Specimen: Blood   Result Value Ref Range    Free T4 1.22 0.93 - 1.70 ng/dL   TSH    Specimen: Blood   Result Value Ref Range    TSH 1.990 0.270 - 4.200 uIU/mL   Lipid Panel With LDL / HDL Ratio    Specimen: Blood   Result Value Ref Range    Total Cholesterol 161 0 - 200 mg/dL    Triglycerides 149 0 - 150 mg/dL    HDL Cholesterol 48 40 - 60 mg/dL    VLDL Cholesterol Rubio 26 5 - 40 mg/dL    LDL Chol Calc (NIH) 87 0 - 100 mg/dL    LDL/HDL RATIO 1.73    Hemoglobin A1c    Specimen: Blood   Result Value Ref Range    Hemoglobin A1C 5.20 4.80 - 5.60 %   Vitamin B12    Specimen: Blood   Result Value Ref Range    Vitamin B-12 814 211 - 946 pg/mL   HCG, B-subunit, Quantitative   Result Value Ref Range    HCG Quantitative <0.50 mIU/mL   CBC & Differential    Specimen: Blood   Result Value Ref Range    WBC 8.03 3.40 - 10.80 10*3/mm3    RBC 4.86 3.77 - 5.28 10*6/mm3    Hemoglobin 13.8 12.0 - 15.9 g/dL    Hematocrit 42.3 34.0 - 46.6 %    MCV 87.0 79.0 - 97.0 fL    MCH 28.4 26.6 - 33.0 pg    MCHC 32.6 31.5 - 35.7 g/dL    RDW 11.9 (L) 12.3 - 15.4 %    Platelets 293 140 - 450 10*3/mm3    Neutrophil Rel % 70.0 42.7 - 76.0 %    Lymphocyte Rel % 22.8 19.6 - 45.3 %    Monocyte Rel % 5.4 5.0 - 12.0 %    Eosinophil Rel % 1.1 0.3 - 6.2 %    Basophil Rel % 0.5 0.0 - 1.5 %    Neutrophils Absolute 5.62 1.70 - 7.00 10*3/mm3    Lymphocytes Absolute 1.83 0.70 - 3.10 10*3/mm3    Monocytes Absolute 0.43 0.10 - 0.90 10*3/mm3    Eosinophils Absolute 0.09 0.00 - 0.40 10*3/mm3    Basophils Absolute 0.04 0.00 - 0.20 10*3/mm3    Immature Granulocyte Rel % 0.2 0.0 - 0.5 %    Immature Grans Absolute 0.02 0.00 - 0.05 10*3/mm3    nRBC 0.0 0.0  - 0.2 /100 WBC     Result Review :                  Assessment and Plan    Diagnoses and all orders for this visit:    1. Anxiety (Primary)  -     buPROPion XL (WELLBUTRIN XL) 300 MG 24 hr tablet; Take 1 tablet by mouth Daily.  Dispense: 90 tablet; Refill: 1    2. Nausea - rec 2-4 weeks therapy with pepcid or omeprazole.  zofran prn.    -     ondansetron ODT (ZOFRAN-ODT) 4 MG disintegrating tablet; Place 1 tablet on the tongue Every 8 (Eight) Hours As Needed for Nausea or Vomiting.  Dispense: 15 tablet; Refill: 1     f/u in 4 weeks if sx not all improved.           Outpatient Medications Prior to Visit   Medication Sig Dispense Refill   • melatonin 5 MG tablet tablet Take 5 mg by mouth.     • metoclopramide (REGLAN) 10 MG tablet Take 10 mg by mouth.     • naproxen sodium (ALEVE) 220 MG tablet Take 220 mg by mouth 2 (Two) Times a Day As Needed.     • norethindrone-ethinyl estradiol FE (Junel FE 1/20) 1-20 MG-MCG per tablet Take 1 tablet by mouth Daily. 84 tablet 3   • propranolol (INDERAL) 20 MG tablet      • rizatriptan (MAXALT) 10 MG tablet Take 10 mg by mouth.     • zonisamide (ZONEGRAN) 100 MG capsule      • buPROPion XL (WELLBUTRIN XL) 150 MG 24 hr tablet TAKE ONE TABLET BY MOUTH DAILY 30 tablet 0   • ondansetron ODT (ZOFRAN-ODT) 4 MG disintegrating tablet DISSOLVE ONE TABLET ON THE TONGUE EVERY 8 HOURS AS NEEDED FOR NAUSEA OR VOMITING 15 tablet 0     No facility-administered medications prior to visit.     New Medications Ordered This Visit   Medications   • buPROPion XL (WELLBUTRIN XL) 300 MG 24 hr tablet     Sig: Take 1 tablet by mouth Daily.     Dispense:  90 tablet     Refill:  1   • ondansetron ODT (ZOFRAN-ODT) 4 MG disintegrating tablet     Sig: Place 1 tablet on the tongue Every 8 (Eight) Hours As Needed for Nausea or Vomiting.     Dispense:  15 tablet     Refill:  1     [unfilled]  Medications Discontinued During This Encounter   Medication Reason   • buPROPion XL (WELLBUTRIN XL) 150 MG 24 hr  tablet Reorder   • ondansetron ODT (ZOFRAN-ODT) 4 MG disintegrating tablet Reorder         Return in about 4 weeks (around 10/19/2021) for Recheck.    Patient was given instructions and counseling regarding her condition or for health maintenance advice. Please see specific information pulled into the AVS if appropriate.

## 2021-10-14 ENCOUNTER — TELEPHONE (OUTPATIENT)
Dept: OBSTETRICS AND GYNECOLOGY | Facility: CLINIC | Age: 31
End: 2021-10-14

## 2021-10-14 NOTE — TELEPHONE ENCOUNTER
Pt is a rescheduled LEEP from 10-14-21, where do you want me to put her at with your schedule.  She said she was told not to push it out any farther than she did due to her wedding.  Please advise.

## 2021-10-18 ENCOUNTER — TELEPHONE (OUTPATIENT)
Dept: CARDIOLOGY | Facility: CLINIC | Age: 31
End: 2021-10-18

## 2021-10-18 NOTE — TELEPHONE ENCOUNTER
"Pt is calling in to let you know for the last 2 weeks she has experienced her heart racing \"on and off all day\" and at times it makes her lightheaded.  Her heart rate now, during our phone call is 97.  I have scheduled her for further evaluation tomorrow.  Thanks  Wanda Fu RN  Triage nurse     "

## 2021-10-19 ENCOUNTER — OFFICE VISIT (OUTPATIENT)
Dept: CARDIOLOGY | Facility: CLINIC | Age: 31
End: 2021-10-19

## 2021-10-19 VITALS
WEIGHT: 238 LBS | SYSTOLIC BLOOD PRESSURE: 104 MMHG | OXYGEN SATURATION: 100 % | HEIGHT: 61 IN | HEART RATE: 99 BPM | DIASTOLIC BLOOD PRESSURE: 78 MMHG | BODY MASS INDEX: 44.93 KG/M2

## 2021-10-19 DIAGNOSIS — R00.2 PALPITATIONS: Primary | ICD-10-CM

## 2021-10-19 DIAGNOSIS — I47.1 PAROXYSMAL SVT (SUPRAVENTRICULAR TACHYCARDIA) (HCC): ICD-10-CM

## 2021-10-19 DIAGNOSIS — R76.8 ANTI-TPO ANTIBODIES PRESENT: ICD-10-CM

## 2021-10-19 PROCEDURE — 93000 ELECTROCARDIOGRAM COMPLETE: CPT | Performed by: NURSE PRACTITIONER

## 2021-10-19 PROCEDURE — 99214 OFFICE O/P EST MOD 30 MIN: CPT | Performed by: NURSE PRACTITIONER

## 2021-10-19 NOTE — PROGRESS NOTES
CARDIOLOGY        Patient Name: Theresa SAUL  :1990  Age: 31 y.o.  Primary Cardiologist: Wiley Koch MD  Encounter Provider:  KEVEN Mccallum    Date of Service: 10/19/21          CHIEF COMPLAINT / REASON FOR OFFICE VISIT     Rapid Heart Rate (f/u for SVT and lightheadedness) and Dizziness      HISTORY OF PRESENT ILLNESS       HPI  Theresa SAUL is a 31 y.o. female who presents today for evaluation of rapid heart rate.     Pt has a  history significant for paroxysmal SVT, anxiety, panic disorder, migraine.    Review of medical records reveals patient called the office yesterday complaining of heart palpitations.  She was scheduled for further evaluation.    Patient reports that for the past several weeks that she has had heart palpitations as well as heart racing.  She has associated symptoms of lightheadedness and fatigue.  She states that 1 month ago she was riding a Peloton bike 3 to 5 days a week plus doing strength conditioning.  She states that now she is so fatigued that she is unable to complete her exercise activities.  She was evaluated by her PCP and was found to be negative for Covid as well as negative for flu.  She states that the symptoms are different than her normal anxiety episodes.  She has had a normal TSH and free T4, however her PCP did order a thyroid paradoxus antibody test which was elevated at 290.  She has a consultation appointment with [facility] endocrinology in November for further recommendations.  She states that she restarted her Inderal when she began having heart palpitations and is currently taking 40 mg/day with no improvement.  She states that minimal activities will elevate her heart rate to the 110s.    The following portions of the patient's history were reviewed and updated as appropriate: allergies, current medications, past family history, past medical history, past social history, past surgical history and problem list.      VITAL  "SIGNS     Visit Vitals  /78 (BP Location: Left arm, Patient Position: Sitting, Cuff Size: Large Adult)   Pulse 99   Ht 154.9 cm (61\")   Wt 108 kg (238 lb)   SpO2 100%   BMI 44.97 kg/m²         Wt Readings from Last 3 Encounters:   10/19/21 108 kg (238 lb)   08/20/21 104 kg (229 lb 12.8 oz)   06/08/21 106 kg (233 lb 6.4 oz)     Body mass index is 44.97 kg/m².      REVIEW OF SYSTEMS   Review of Systems   Constitutional: Positive for malaise/fatigue. Negative for chills, fever, weight gain and weight loss.   Cardiovascular: Positive for irregular heartbeat and palpitations. Negative for leg swelling.   Respiratory: Negative for cough, snoring and wheezing.    Hematologic/Lymphatic: Negative for bleeding problem. Does not bruise/bleed easily.   Skin: Negative for color change.   Musculoskeletal: Negative for falls, joint pain and myalgias.   Gastrointestinal: Negative for melena.   Genitourinary: Negative for hematuria.   Neurological: Negative for excessive daytime sleepiness.   Psychiatric/Behavioral: Negative for depression. The patient is not nervous/anxious.            PHYSICAL EXAMINATION     Vitals and nursing note reviewed.   Constitutional:       Appearance: Normal appearance. Well-developed.   Eyes:      Conjunctiva/sclera: Conjunctivae normal.   Neck:      Vascular: No carotid bruit.   Pulmonary:      Breath sounds: Normal breath sounds.   Cardiovascular:      Normal rate. Regular rhythm. Normal S1 with normal intensity. Normal S2 with normal intensity.      Murmurs: There is no murmur.      No gallop. No click. No rub.   Edema:     Peripheral edema absent.   Musculoskeletal: Normal range of motion. Skin:     General: Skin is warm and dry.   Neurological:      Mental Status: Alert and oriented to person, place, and time.      GCS: GCS eye subscore is 4. GCS verbal subscore is 5. GCS motor subscore is 6.   Psychiatric:         Speech: Speech normal.         Behavior: Behavior normal.         Thought " Content: Thought content normal.         Judgment: Judgment normal.           REVIEWED DATA       ECG 12 Lead    Date/Time: 10/19/2021 9:54 AM  Performed by: Rosalba Winslow APRN  Authorized by: Rosalba Winslow APRN   Comparison: compared with previous ECG from 4/10/2019  Rhythm: sinus rhythm  Rate: normal  BPM: 87  Conduction: conduction normal  ST Segments: ST segments normal  T Waves: T waves normal  QRS axis: normal    Clinical impression: normal ECG            Cardiac Procedures:  1. Echocardiogram 5/1/2019.  LVEF 61%.  Calculated RVSP 19 mmHg.    Lipid Panel    Lipid Panel 8/20/21   Total Cholesterol 161   Triglycerides 149   HDL Cholesterol 48   VLDL Cholesterol 26   LDL Cholesterol  87   LDL/HDL Ratio 1.73      Comments are available for some flowsheets but are not being displayed.               ASSESSMENT & PLAN      Diagnosis Plan   1. Palpitations  Holter Monitor - 24 Hour   2. Paroxysmal SVT (supraventricular tachycardia) (HCC)     3. Anti-TPO antibodies present           SUMMARY/DISCUSSION  1. Palpitations.  Patient has been experiencing palpitations and tachycardias on a daily basis.  ECG is unremarkable and reveals normal sinus rhythm.  Patient states that her heart rate will elevate to 110s with very minimal activities.  She has associated symptoms of lightheadedness and fatigue.  I recommended that she continue Inderal 40 mg/day.  We will place 24-hour Holter to further assess her average heart rate and rhythm.  2. History of paroxysmal SVT.  3. TPO antibodies present.  Patient with normal TSH and normal T4.  .  She has endocrinology follow-up arranged.  4. Follow-up to be arranged after her Holter monitor.        MEDICATIONS         Discharge Medications          Accurate as of October 19, 2021 10:12 AM. If you have any questions, ask your nurse or doctor.            Continue These Medications      Instructions Start Date   buPROPion  MG 24 hr tablet  Commonly known as:  WELLBUTRIN XL   300 mg, Oral, Daily      melatonin 5 MG tablet tablet   5 mg, Oral      metoclopramide 10 MG tablet  Commonly known as: REGLAN   10 mg, Oral      naproxen sodium 220 MG tablet  Commonly known as: ALEVE   220 mg, Oral, 2 Times Daily PRN      norethindrone-ethinyl estradiol FE 1-20 MG-MCG per tablet  Commonly known as: Junel FE 1/20   1 tablet, Oral, Daily      ondansetron ODT 4 MG disintegrating tablet  Commonly known as: ZOFRAN-ODT   4 mg, Translingual, Every 8 Hours PRN      propranolol 20 MG tablet  Commonly known as: INDERAL   40 mg, Oral      rizatriptan 10 MG tablet  Commonly known as: MAXALT   10 mg, Oral      zonisamide 100 MG capsule  Commonly known as: ZONEGRAN   No dose, route, or frequency recorded.                 **Dragon Disclaimer:   Much of this encounter note is an electronic transcription/translation of spoken language to printed text. The electronic translation of spoken language may permit erroneous, or at times, nonsensical words or phrases to be inadvertently transcribed. Although I have reviewed the note for such errors, some may still exist.

## 2021-10-25 ENCOUNTER — PROCEDURE VISIT (OUTPATIENT)
Dept: OBSTETRICS AND GYNECOLOGY | Facility: CLINIC | Age: 31
End: 2021-10-25

## 2021-10-25 VITALS
BODY MASS INDEX: 44.56 KG/M2 | DIASTOLIC BLOOD PRESSURE: 76 MMHG | WEIGHT: 236 LBS | HEIGHT: 61 IN | SYSTOLIC BLOOD PRESSURE: 122 MMHG

## 2021-10-25 DIAGNOSIS — R87.613 HGSIL ON CYTOLOGIC SMEAR OF CERVIX: Primary | ICD-10-CM

## 2021-10-25 DIAGNOSIS — Z13.89 SCREENING FOR GENITOURINARY CONDITION: ICD-10-CM

## 2021-10-25 LAB
B-HCG UR QL: NEGATIVE
EXPIRATION DATE: NORMAL
INTERNAL NEGATIVE CONTROL: NEGATIVE
INTERNAL POSITIVE CONTROL: POSITIVE
Lab: NORMAL

## 2021-10-25 PROCEDURE — 81025 URINE PREGNANCY TEST: CPT | Performed by: OBSTETRICS & GYNECOLOGY

## 2021-10-25 PROCEDURE — 57460 BX OF CERVIX W/SCOPE LEEP: CPT | Performed by: OBSTETRICS & GYNECOLOGY

## 2021-10-25 NOTE — PROGRESS NOTES
LEEP Procedure Note    Indications: HGSIL    Procedure Details     The risks and benefits of the procedure and Verbal informed consent obtained.    Speculum placed in vagina and excellent visualization of cervix achieved. The cervix was swabbed with acetic acid solution. Once the t zone was identified in eternity, 10 cc of 1% lidocaine with epi was used to infiltrate the cervix. Appropriate sized loop was selected and using the cutting setting at 50, the transformation zone was removed in 1 pass(es) of the device.     Hemostasis was achieved using the cautery roller ball and Monsel's solution with excellent results.     Pt tolerated the procedure well    Findings: normal appearing cervix    Specimens: LEEP biopsy of cervix     Complications: none.       Plan:  1) Send specimen for pathology  2) Follow up will be based on [facility] guidelines.  3) Post procedure expectations and warning signs reviewed

## 2021-10-27 LAB
DX ICD CODE: NORMAL
DX ICD CODE: NORMAL
PATH REPORT.FINAL DX SPEC: NORMAL
PATH REPORT.GROSS SPEC: NORMAL
PATH REPORT.SITE OF ORIGIN SPEC: NORMAL
PATHOLOGIST NAME: NORMAL
PAYMENT PROCEDURE: NORMAL

## 2021-11-09 ENCOUNTER — OFFICE VISIT (OUTPATIENT)
Dept: OBSTETRICS AND GYNECOLOGY | Facility: CLINIC | Age: 31
End: 2021-11-09

## 2021-11-09 VITALS
WEIGHT: 231 LBS | BODY MASS INDEX: 43.61 KG/M2 | SYSTOLIC BLOOD PRESSURE: 124 MMHG | HEIGHT: 61 IN | DIASTOLIC BLOOD PRESSURE: 76 MMHG

## 2021-11-09 DIAGNOSIS — Z09 POSTOPERATIVE FOLLOW-UP: Primary | ICD-10-CM

## 2021-11-09 DIAGNOSIS — N87.9 CERVICAL DYSPLASIA: ICD-10-CM

## 2021-11-09 LAB

## 2021-11-09 PROCEDURE — 81002 URINALYSIS NONAUTO W/O SCOPE: CPT | Performed by: OBSTETRICS & GYNECOLOGY

## 2021-11-09 PROCEDURE — 81025 URINE PREGNANCY TEST: CPT | Performed by: OBSTETRICS & GYNECOLOGY

## 2021-11-09 PROCEDURE — 99212 OFFICE O/P EST SF 10 MIN: CPT | Performed by: OBSTETRICS & GYNECOLOGY

## 2021-11-09 NOTE — PROGRESS NOTES
"PROBLEM VISIT    Chief Complaint: post op      Theresa SAUL is a 31 y.o. patient who presents for follow up after LEEP for HGSIL.  Patient doing well.  She denies any vaginal bleeding.  She denies any abnormal vaginal discharge.  Chief Complaint   Patient presents with   • Post-op     LEEP             The following portions of the patient's history were reviewed and updated as appropriate: allergies, current medications and problem list.    Review of Systems   Constitutional: Negative for appetite change, chills, fatigue, fever and unexpected weight change.   Gastrointestinal: Negative for abdominal distention, abdominal pain, anal bleeding, blood in stool, constipation, diarrhea, nausea and vomiting.   Genitourinary: Negative for dyspareunia, dysuria, menstrual problem, pelvic pain, vaginal bleeding, vaginal discharge and vaginal pain.       /76   Ht 154.9 cm (60.98\")   Wt 105 kg (231 lb)   LMP 11/08/2021   Breastfeeding No   BMI 43.67 kg/m²     Physical Exam  Vitals reviewed.   Constitutional:       General: She is not in acute distress.     Appearance: Normal appearance. She is well-developed. She is not toxic-appearing or diaphoretic.   Genitourinary:     Labia:         Right: No rash, tenderness, lesion or injury.         Left: No rash, tenderness, lesion or injury.       Vagina: No signs of injury and foreign body. No vaginal discharge, erythema, tenderness or bleeding.      Cervix: No cervical motion tenderness, discharge or friability.      Comments: Well-healing cervix.  Skin:     General: Skin is warm.      Coloration: Skin is not pale.      Findings: No erythema or rash.   Neurological:      General: No focal deficit present.      Mental Status: She is alert and oriented to person, place, and time. Mental status is at baseline.      Cranial Nerves: No cranial nerve deficit.      Motor: No weakness.      Coordination: Coordination normal.      Gait: Gait normal.   Psychiatric:         Mood and " Affect: Mood normal.         Behavior: Behavior normal.         Thought Content: Thought content normal.         Judgment: Judgment normal.           Assessment/Plan   Diagnoses and all orders for this visit:    1. Postoperative follow-up (Primary)  -     POC Urinalysis Dipstick  -     POC Pregnancy, Urine    2. Cervical dysplasia      32yo with HGSIL s/p LEEP    POD# 10/25/21  Pathology with DEBO 2 with free margins  FU 6 months             Return in about 6 months (around 5/9/2022) for Gynecology FU.      Estella Soria DO    11/10/2021  14:42 EST

## 2021-11-16 ENCOUNTER — TELEPHONE (OUTPATIENT)
Dept: CARDIOLOGY | Facility: CLINIC | Age: 31
End: 2021-11-16

## 2021-11-16 DIAGNOSIS — I47.1 PAROXYSMAL SVT (SUPRAVENTRICULAR TACHYCARDIA) (HCC): ICD-10-CM

## 2021-11-16 DIAGNOSIS — R00.2 PALPITATIONS: Primary | ICD-10-CM

## 2021-11-16 NOTE — TELEPHONE ENCOUNTER
Notified pt of Clementina's recommendations. She knows to expect a call from scheduling.    Thank you,    Paula Chow RN  Triage MG

## 2021-11-16 NOTE — TELEPHONE ENCOUNTER
Chriss Mcrae called again this afternoon. She said she is still having palpitations. She was standing in line at the grocery store, she got SOA and her Apple Watch read a HR of 160s.     She said she is not eating or drinking any stimulants. She has cut all of them from her diet. She said the propranolol helps at times, but not others. She questioned if she could do a longer monitor.    Thank you,    Paula Chow RN  Triage MG

## 2021-11-24 ENCOUNTER — TELEPHONE (OUTPATIENT)
Dept: CARDIOLOGY | Facility: CLINIC | Age: 31
End: 2021-11-24

## 2021-11-24 NOTE — TELEPHONE ENCOUNTER
PA for her event monitor went to [employer]. If you can call [phone]. Let them know your calling about Case #: [id number] to get the event monitor approve.

## 2021-12-14 ENCOUNTER — OFFICE VISIT (OUTPATIENT)
Dept: CARDIOLOGY | Facility: CLINIC | Age: 31
End: 2021-12-14

## 2021-12-14 VITALS
OXYGEN SATURATION: 99 % | BODY MASS INDEX: 43.65 KG/M2 | WEIGHT: 231.2 LBS | HEART RATE: 80 BPM | HEIGHT: 61 IN | DIASTOLIC BLOOD PRESSURE: 78 MMHG | SYSTOLIC BLOOD PRESSURE: 110 MMHG

## 2021-12-14 DIAGNOSIS — I47.1 ATRIAL TACHYCARDIA (HCC): Primary | ICD-10-CM

## 2021-12-14 PROBLEM — I47.19 ATRIAL TACHYCARDIA: Status: ACTIVE | Noted: 2019-04-12

## 2021-12-14 PROCEDURE — 99213 OFFICE O/P EST LOW 20 MIN: CPT | Performed by: INTERNAL MEDICINE

## 2021-12-14 RX ORDER — BUSPIRONE HYDROCHLORIDE 7.5 MG/1
7.5 TABLET ORAL DAILY
COMMUNITY
Start: 2021-12-08 | End: 2022-05-10

## 2021-12-14 NOTE — PROGRESS NOTES
"      CARDIOLOGY    Wiley Koch MD    ENCOUNTER DATE:  12/14/2021    Theresa SAUL / 31 y.o. / female        CHIEF COMPLAINT / REASON FOR OFFICE VISIT     Rapid Heart Rate      HISTORY OF PRESENT ILLNESS       HPI  Theresa SAUL is a 31 y.o. female who presents today for reevaluation.  Clinically she is actually better.  She was empirically placed on propanolol.  With that she that most of her symptoms has resolved.  She has been watching her weight since she started the propanolol she is actually down 8 pounds.  Patient before was having so many issues that she was unable to exercise.  Everything has improved to the point where she is now doing some exercise.  She currently has a 30-day monitor on course she has had minimal symptoms while that was on.  She also has an apple watch actually produced a pretty good rhythm strip and something we can utilize in the future.      The following portions of the patient's history were reviewed and updated as appropriate: allergies, current medications, past family history, past medical history, past social history, past surgical history and problem list.      VITAL SIGNS     Visit Vitals  /78 (BP Location: Left arm, Patient Position: Sitting)   Pulse 80   Ht 154.9 cm (60.98\")   Wt 105 kg (231 lb 3.2 oz)   SpO2 99%   BMI 43.71 kg/m²         Wt Readings from Last 3 Encounters:   12/14/21 105 kg (231 lb 3.2 oz)   11/09/21 105 kg (231 lb)   10/25/21 107 kg (236 lb)     Body mass index is 43.71 kg/m².      REVIEW OF SYSTEMS   ROS        PHYSICAL EXAMINATION     Vitals reviewed.   Constitutional:       Appearance: Healthy appearance.   Pulmonary:      Effort: Pulmonary effort is normal.   Cardiovascular:      Normal rate. Regular rhythm. Normal S1. Normal S2.      Murmurs: There is no murmur.      No gallop. No click. No rub.   Pulses:     Intact distal pulses.   Edema:     Peripheral edema absent.   Neurological:      Mental Status: Alert and oriented to person, " place and time.           REVIEWED DATA     Procedures    Cardiac Procedures:  1.     Lipid Panel    Lipid Panel 8/20/21   Total Cholesterol 161   Triglycerides 149   HDL Cholesterol 48   VLDL Cholesterol 26   LDL Cholesterol  87   LDL/HDL Ratio 1.73      Comments are available for some flowsheets but are not being displayed.               ASSESSMENT & PLAN      Diagnosis Plan   1. Atrial tachycardia (HCC)           SUMMARY/DISCUSSION  1. Inappropriate atrial tachycardia.  She has responded very nicely to propanolol which I would continue for now.  We will see if her 30-day monitor shows anything.  Patient is starting to exercise more she says she feels well enough to try.  All is excellent news.  At this point I would continue the same I want her follow back up in about 3 to 4 months.  If she is doing well at that point I would like to come down on her propanolol some.  She was also diagnosed with Hashimoto's which could be a contributing factor.  She still said 1 glass of wine just really sets her heart off quite a bit.  Patient was started around the first part of November with the beta-blocker and since that time she has had minimal episodes.        MEDICATIONS         Discharge Medications          Accurate as of December 14, 2021  1:44 PM. If you have any questions, ask your nurse or doctor.            Continue These Medications      Instructions Start Date   buPROPion  MG 24 hr tablet  Commonly known as: WELLBUTRIN XL   300 mg, Oral, Daily      busPIRone 7.5 MG tablet  Commonly known as: BUSPAR   7.5 mg, Oral, Daily      melatonin 5 MG tablet tablet   5 mg, Oral      metoclopramide 10 MG tablet  Commonly known as: REGLAN   10 mg, Oral      naproxen sodium 220 MG tablet  Commonly known as: ALEVE   220 mg, Oral, 2 Times Daily PRN      norethindrone-ethinyl estradiol FE 1-20 MG-MCG per tablet  Commonly known as: Junel FE 1/20   1 tablet, Oral, Daily      ondansetron ODT 4 MG disintegrating tablet  Commonly  known as: ZOFRAN-ODT   4 mg, Translingual, Every 8 Hours PRN      propranolol 20 MG tablet  Commonly known as: INDERAL   40 mg, Oral      rizatriptan 10 MG tablet  Commonly known as: MAXALT   10 mg, Oral         Stop These Medications    zonisamide 100 MG capsule  Commonly known as: ZONEGRAN  Stopped by: Wiley Koch MD                **Dragon Disclaimer:   Much of this encounter note is an electronic transcription/translation of spoken language to printed text. The electronic translation of spoken language may permit erroneous, or at times, nonsensical words or phrases to be inadvertently transcribed. Although I have reviewed the note for such errors, some may still exist.

## 2022-03-07 RX ORDER — NORETHINDRONE ACETATE AND ETHINYL ESTRADIOL 1MG-20(21)
1 KIT ORAL DAILY
Qty: 84 TABLET | Refills: 0 | Status: SHIPPED | OUTPATIENT
Start: 2022-03-07 | End: 2023-01-04

## 2022-05-10 ENCOUNTER — OFFICE VISIT (OUTPATIENT)
Dept: OBSTETRICS AND GYNECOLOGY | Facility: CLINIC | Age: 32
End: 2022-05-10

## 2022-05-10 VITALS
DIASTOLIC BLOOD PRESSURE: 72 MMHG | SYSTOLIC BLOOD PRESSURE: 122 MMHG | BODY MASS INDEX: 42.93 KG/M2 | HEIGHT: 61 IN | WEIGHT: 227.4 LBS

## 2022-05-10 DIAGNOSIS — Z11.51 SPECIAL SCREENING EXAMINATION FOR HUMAN PAPILLOMAVIRUS (HPV): ICD-10-CM

## 2022-05-10 DIAGNOSIS — Z01.419 PAP SMEAR, LOW-RISK: ICD-10-CM

## 2022-05-10 DIAGNOSIS — Z01.419 ENCOUNTER FOR GYNECOLOGICAL EXAMINATION: Primary | ICD-10-CM

## 2022-05-10 LAB
B-HCG UR QL: NEGATIVE
BILIRUB BLD-MCNC: NEGATIVE MG/DL
CLARITY, POC: CLEAR
COLOR UR: YELLOW
EXPIRATION DATE: NORMAL
GLUCOSE UR STRIP-MCNC: NEGATIVE MG/DL
INTERNAL NEGATIVE CONTROL: NORMAL
INTERNAL POSITIVE CONTROL: NORMAL
KETONES UR QL: NEGATIVE
LEUKOCYTE EST, POC: NEGATIVE
Lab: NORMAL
NITRITE UR-MCNC: NEGATIVE MG/ML
PH UR: 5 [PH] (ref 5–8)
PROT UR STRIP-MCNC: NEGATIVE MG/DL
RBC # UR STRIP: NEGATIVE /UL
SP GR UR: 1 (ref 1–1.03)
UROBILINOGEN UR QL: NORMAL

## 2022-05-10 PROCEDURE — 81002 URINALYSIS NONAUTO W/O SCOPE: CPT | Performed by: OBSTETRICS & GYNECOLOGY

## 2022-05-10 PROCEDURE — 81025 URINE PREGNANCY TEST: CPT | Performed by: OBSTETRICS & GYNECOLOGY

## 2022-05-10 PROCEDURE — 99395 PREV VISIT EST AGE 18-39: CPT | Performed by: OBSTETRICS & GYNECOLOGY

## 2022-05-10 RX ORDER — PHENTERMINE HYDROCHLORIDE 30 MG/1
30 CAPSULE ORAL EVERY MORNING
COMMUNITY
End: 2022-08-02

## 2022-05-10 RX ORDER — NORETHINDRONE ACETATE AND ETHINYL ESTRADIOL 1MG-20(21)
1 KIT ORAL DAILY
Qty: 84 TABLET | Refills: 3 | Status: SHIPPED | OUTPATIENT
Start: 2022-05-10 | End: 2022-08-02

## 2022-05-10 NOTE — PROGRESS NOTES
GYN Annual Exam     CC- Here for annual exam.     Theresa SAUL is a 32 y.o. female who presents for annual well woman exam. Pt is on OCPs. She is reporting very light cycle on OCPs. She has not had a cycle since 3/2022. Pt is .     OB History        1    Para   1    Term   1            AB        Living   1       SAB        IAB        Ectopic        Molar        Multiple        Live Births   1                Current contraception: OCPs  History of abnormal Pap smear: Cervical dysplasia: s/p HGSIL 10/25/21. Pathology with DEBO 2 with free margins.  History of abnormal mammogram: no  Family history of uterine, colon or ovarian cancer: no  Family history of breast cancer: no    Health Maintenance   Topic Date Due   • COVID-19 Vaccine (3 - Booster for Pfizer series) 2021   • ANNUAL PHYSICAL  2021   • Annual Gynecologic Pelvic and Breast Exam  2022   • INFLUENZA VACCINE  2022   • PAP SMEAR  2024   • TDAP/TD VACCINES (2 - Td or Tdap) 2030   • HEPATITIS C SCREENING  Completed   • Pneumococcal Vaccine 0-64  Aged Out       Past Medical History:   Diagnosis Date   • Abnormal Pap smear of cervix    • Allergic rhinitis    • Anxiety    • Carpal tunnel syndrome, right upper limb    • Chronic migraine without aura, not intractable    • GERD without esophagitis    • HPV (human papilloma virus) infection    • Insomnia    • Ovarian cyst    • Panic disorder without agoraphobia    • PONV (postoperative nausea and vomiting)    • Spinal headache        Past Surgical History:   Procedure Laterality Date   • ADENOIDECTOMY     •  SECTION     • CHOLECYSTECTOMY     • MIDDLE EAR SURGERY     • MYRINGOTOMY W/ TUBES     • TONSILLECTOMY           Current Outpatient Medications:   •  melatonin 5 MG tablet tablet, Take 5 mg by mouth., Disp: , Rfl:   •  metoclopramide (REGLAN) 10 MG tablet, Take 10 mg by mouth., Disp: , Rfl:   •  naproxen sodium (ALEVE) 220 MG tablet, Take 220 mg by  "mouth 2 (Two) Times a Day As Needed., Disp: , Rfl:   •  norethindrone-ethinyl estradiol FE (Junel FE 1/20) 1-20 MG-MCG per tablet, Take 1 tablet by mouth Daily., Disp: 84 tablet, Rfl: 0  •  norethindrone-ethinyl estradiol FE (Junel FE 1/20) 1-20 MG-MCG per tablet, Take 1 tablet by mouth Daily., Disp: 84 tablet, Rfl: 3  •  ondansetron ODT (ZOFRAN-ODT) 4 MG disintegrating tablet, Place 1 tablet on the tongue Every 8 (Eight) Hours As Needed for Nausea or Vomiting., Disp: 15 tablet, Rfl: 1  •  phentermine 30 MG capsule, Take 30 mg by mouth Every Morning., Disp: , Rfl:   •  propranolol (INDERAL) 20 MG tablet, Take 40 mg by mouth., Disp: , Rfl:   •  rizatriptan (MAXALT) 10 MG tablet, Take 10 mg by mouth., Disp: , Rfl:     Allergies   Allergen Reactions   • Prednisone Hives       Social History     Tobacco Use   • Smoking status: Former Smoker     Packs/day: 0.00     Years: 0.50     Pack years: 0.00     Quit date: 2018     Years since quittin.2   • Smokeless tobacco: Never Used   • Tobacco comment: quit 3 months ago   Substance Use Topics   • Alcohol use: Yes     Alcohol/week: 0.0 standard drinks     Comment: Socially drinks   • Drug use: No       Family History   Problem Relation Age of Onset   • Hypertension Mother    • Heart failure Father    • Heart disease Father    • Hypertension Father    • Diabetes Paternal Aunt    • Diabetes Paternal Uncle    • Stroke Maternal Grandmother    • Heart disease Paternal Grandfather    • Diabetes Paternal Uncle        Review of Systems   Constitutional: Negative for appetite change, chills, fatigue, fever and unexpected weight change.   Gastrointestinal: Negative for abdominal distention, abdominal pain, anal bleeding, blood in stool, constipation, diarrhea, nausea and vomiting.   Genitourinary: Negative for dyspareunia, dysuria, menstrual problem, pelvic pain, vaginal bleeding, vaginal discharge and vaginal pain.       /72   Ht 154.9 cm (60.98\")   Wt 103 kg (227 lb 6.4 " oz)   LMP 03/18/2022 (Exact Date)   BMI 42.99 kg/m²     Physical Exam  Vitals reviewed.   Constitutional:       General: She is not in acute distress.     Appearance: Normal appearance. She is well-developed. She is not ill-appearing, toxic-appearing or diaphoretic.   HENT:      Mouth/Throat:      Dentition: Normal dentition. No dental caries.   Cardiovascular:      Rate and Rhythm: Normal rate and regular rhythm.      Heart sounds: Normal heart sounds.   Pulmonary:      Effort: Pulmonary effort is normal. No respiratory distress.      Breath sounds: Normal breath sounds. No stridor. No wheezing.   Chest:   Breasts:      Right: No inverted nipple, mass, nipple discharge, skin change or tenderness.      Left: No inverted nipple, mass, nipple discharge, skin change or tenderness.       Abdominal:      General: There is no distension.      Palpations: Abdomen is soft. There is no mass.      Tenderness: There is no abdominal tenderness.   Genitourinary:     Labia:         Right: No rash, tenderness or lesion.         Left: No rash, tenderness or lesion.       Vagina: No vaginal discharge, tenderness or bleeding.      Cervix: No cervical motion tenderness, discharge or friability.      Uterus: Not deviated, not enlarged, not fixed and not tender.       Adnexa:         Right: No mass, tenderness or fullness.          Left: No mass, tenderness or fullness.     Musculoskeletal:         General: No tenderness. Normal range of motion.   Skin:     General: Skin is warm.      Findings: No erythema or rash.   Neurological:      General: No focal deficit present.      Mental Status: She is alert and oriented to person, place, and time. Mental status is at baseline.      Cranial Nerves: No cranial nerve deficit.      Coordination: Coordination normal.   Psychiatric:         Mood and Affect: Mood normal.         Behavior: Behavior normal.         Thought Content: Thought content normal.         Judgment: Judgment normal.             Assessment/Plan    1) GYN HM: Cervical dysplasia: s/p HGSIL 10/25/21. Pathology with DEBO 2 with free margins. Check pap today. If normal then repeat in 6 months.  SBE demonstrated and encouraged.  2) STD screening: declines  3) Contraception: OCPs  4) Family Planning: . She is stepparent to 2 kids. She is unsure about future children.  5) Diet and Exercise discussed  6) Smoking Status: nonsmoker  7) Social: got  10/2021. Just got back from [place].  at [facility].  8) Follow up prn and 1 year       Diagnoses and all orders for this visit:    Encounter for gynecological examination  -     POC Pregnancy, Urine  -     POC Urinalysis Dipstick  -     IgP, Aptima HPV    Special screening examination for human papillomavirus (HPV)  -     IgP, Aptima HPV    Pap smear, low-risk  -     IgP, Aptima HPV    Other orders  -     phentermine 30 MG capsule; Take 30 mg by mouth Every Morning.  -     norethindrone-ethinyl estradiol FE (Junel FE 1/20) 1-20 MG-MCG per tablet; Take 1 tablet by mouth Daily.          Estella Soria DO  5/10/2022  09:33 EDT

## 2022-05-15 LAB
CYTOLOGIST CVX/VAG CYTO: NORMAL
CYTOLOGY CVX/VAG DOC CYTO: NORMAL
CYTOLOGY CVX/VAG DOC THIN PREP: NORMAL
DX ICD CODE: NORMAL
HIV 1 & 2 AB SER-IMP: NORMAL
HPV I/H RISK 4 DNA CVX QL PROBE+SIG AMP: NEGATIVE
Lab: NORMAL
OTHER STN SPEC: NORMAL
STAT OF ADQ CVX/VAG CYTO-IMP: NORMAL

## 2022-08-02 ENCOUNTER — OFFICE VISIT (OUTPATIENT)
Dept: OBSTETRICS AND GYNECOLOGY | Facility: CLINIC | Age: 32
End: 2022-08-02

## 2022-08-02 VITALS
DIASTOLIC BLOOD PRESSURE: 86 MMHG | BODY MASS INDEX: 42.37 KG/M2 | HEIGHT: 61 IN | SYSTOLIC BLOOD PRESSURE: 126 MMHG | WEIGHT: 224.4 LBS

## 2022-08-02 DIAGNOSIS — Z13.9 SCREENING FOR CONDITION: ICD-10-CM

## 2022-08-02 DIAGNOSIS — N63.23 MASS OF LOWER OUTER QUADRANT OF LEFT BREAST: Primary | ICD-10-CM

## 2022-08-02 LAB
B-HCG UR QL: NEGATIVE
EXPIRATION DATE: NORMAL
INTERNAL NEGATIVE CONTROL: NORMAL
INTERNAL POSITIVE CONTROL: NORMAL
Lab: NORMAL

## 2022-08-02 PROCEDURE — 99213 OFFICE O/P EST LOW 20 MIN: CPT | Performed by: OBSTETRICS & GYNECOLOGY

## 2022-08-02 PROCEDURE — 81025 URINE PREGNANCY TEST: CPT | Performed by: OBSTETRICS & GYNECOLOGY

## 2022-08-02 NOTE — PROGRESS NOTES
"PROBLEM VISIT    Chief Complaint: LEFT BREAST MASS      Theresa SAUL is a 32 y.o. patient who presents for follow up of left breast mass. Pt has a known left breast mass. She feels as though the mass is enlarging. She has seen Dr Zapien's office and was last examined 5/2021- the area was felt to be  Dense breast tissue.    Chief Complaint   Patient presents with   • Breast Mass     LEFT BREAST LUMP FEELS BIGGER             The following portions of the patient's history were reviewed and updated as appropriate: allergies, current medications and problem list.    Review of Systems   Constitutional: Negative for appetite change, chills, fatigue, fever and unexpected weight change.   Gastrointestinal: Negative for abdominal distention, abdominal pain, anal bleeding, blood in stool, constipation, diarrhea, nausea and vomiting.   Genitourinary: Negative for dyspareunia, dysuria, menstrual problem, pelvic pain, vaginal bleeding, vaginal discharge and vaginal pain.       /86   Ht 154.9 cm (61\")   Wt 102 kg (224 lb 6.4 oz)   BMI 42.40 kg/m²     Physical Exam  Constitutional:       General: She is not in acute distress.     Appearance: Normal appearance. She is not ill-appearing, toxic-appearing or diaphoretic.   Chest:   Breasts:      Left: Mass present.            Comments: Small breast mass x2. 6 oclock positon and 4 oclock position  Neurological:      General: No focal deficit present.      Mental Status: She is alert and oriented to person, place, and time. Mental status is at baseline.      Motor: No weakness.      Gait: Gait normal.   Psychiatric:         Mood and Affect: Mood normal.         Behavior: Behavior normal.         Thought Content: Thought content normal.         Judgment: Judgment normal.           Assessment & Plan   Diagnoses and all orders for this visit:    1. Mass of lower outer quadrant of left breast (Primary)  -     Cancel: Mammo Diagnostic Digital Tomosynthesis Right With CAD; " Future  -     US Breast Left Limited; Future  -     Mammo Diagnostic Bilateral With CAD; Future    2. Screening for condition  -     POC Pregnancy, Urine      31yo with breast mass x2    1) breast mass: Patient has a known left breast mass that has been followed by an ultrasound in the past and Dr. Zapien's office that thought the area was dense breast tissue that was being palpated.  That area is felt today but also an additional area of abnormality is noted.  This patient is now 32 years of age we will proceed with a diagnostic mammogram and a left breast ultrasound.  Depending on the results of the imaging then may refer back to Dr. Zapien's office. Last breast imaging was Left breast US in 4/2021.    2) gyn HM: LPS 5/2022    3) Contraception: OCPs.             No follow-ups on file.      Estella Soria DO    8/8/2022  21:12 EDT

## 2022-08-29 ENCOUNTER — HOSPITAL ENCOUNTER (OUTPATIENT)
Dept: MAMMOGRAPHY | Facility: HOSPITAL | Age: 32
Discharge: HOME | End: 2022-08-29

## 2022-08-29 ENCOUNTER — HOSPITAL ENCOUNTER (OUTPATIENT)
Dept: ULTRASOUND IMAGING | Facility: HOSPITAL | Age: 32
Discharge: HOME | End: 2022-08-29

## 2022-08-29 DIAGNOSIS — N63.23 MASS OF LOWER OUTER QUADRANT OF LEFT BREAST: ICD-10-CM

## 2022-08-29 PROCEDURE — 77066 DX MAMMO INCL CAD BI: CPT

## 2022-08-29 PROCEDURE — G0279 TOMOSYNTHESIS, MAMMO: HCPCS

## 2022-08-29 PROCEDURE — 76642 ULTRASOUND BREAST LIMITED: CPT

## 2022-09-06 ENCOUNTER — OFFICE VISIT (OUTPATIENT)
Dept: SURGERY | Facility: CLINIC | Age: 32
End: 2022-09-06

## 2022-09-06 VITALS
HEIGHT: 61 IN | WEIGHT: 224 LBS | SYSTOLIC BLOOD PRESSURE: 119 MMHG | DIASTOLIC BLOOD PRESSURE: 87 MMHG | BODY MASS INDEX: 42.29 KG/M2

## 2022-09-06 DIAGNOSIS — E66.01 MORBID OBESITY: ICD-10-CM

## 2022-09-06 DIAGNOSIS — N60.11 FIBROCYSTIC BREAST CHANGES, BILATERAL: ICD-10-CM

## 2022-09-06 DIAGNOSIS — N60.12 FIBROCYSTIC BREAST CHANGES, BILATERAL: ICD-10-CM

## 2022-09-06 DIAGNOSIS — N63.42 SUBAREOLAR MASS OF LEFT BREAST: Primary | ICD-10-CM

## 2022-09-06 PROCEDURE — 99213 OFFICE O/P EST LOW 20 MIN: CPT | Performed by: NURSE PRACTITIONER

## 2022-09-06 RX ORDER — PHENTERMINE HYDROCHLORIDE 37.5 MG/1
37.5 CAPSULE ORAL
COMMUNITY
End: 2023-01-04

## 2022-09-06 NOTE — PROGRESS NOTES
Chief Complaint: Theresa SAUL is a 32 y.o. female who was seen for breast mass follow up.    History of Present Illness:  10/26/2020:  Seen by Dr Zapien  Patient presents with breast mass.  She had a breast exam by her gynecologist in 2020 at her 4-week checkup after her Kyleena was placed.  At that visit Dr. Soria noticed a left breast nodularity.  She saw her in follow-up in  and noted that the nodularity had not improved so arranged for her to see us.    She noted no other new masses, skin changes, nipple discharge, nipple changes prior to her most recent imaging.  Her most recent imaging includes the followin2020 [facility]  Radiology  Theresa Dyson  US LEFT BREAST  One week history of palpable nontender lump in the lower midline left breast.  Normal ultrasound examination of the left breast.    She has not had a breast biopsy in the past.  She has her uterus and ovaries, is premenopausal, and has not been taking birth control since her Kyleena was removed in 2020.  This was removed due to worsening headaches.  She tells me that her headaches have not improved since the Kyleena was removed.  Her family history includes the following: She has no sisters, one maternal aunt, one paternal aunt.  No family history of breast or ovarian cancer.    He has gained 35 pounds since 2017 when she quit smoking.  She is here for evaluation.  At that visit with exam: Left breast: There is a palpable area of spindle-shaped dense breast tissue at the 6:00, 2.5 cm from the nipple of area of interest to the patient.  Smoothly marginated. This is clinically consistent with superficial and dense breast tissue.  We reviewed her history, imaging, imaging reports, bedside ultrasound examination together today.  I am reassured by her imaging and examination.  I do believe that this is an area of dense breast tissue that is palpable.  On her bedside ultrasound today dense fibroglandular  tissue is seen at the area of palpable finding. No abnormal solid or cystic masses seen.    So that we can have at least 1 year of clinical follow-up, I have ordered a left breast ultrasound at [facility]    5/3/2021:  She returns today for follow up with unchanged left breast nodularity, no breast pain except cyclic tenderness.    Left breast US on 4/26/2021: BiRads 1, negative (see full report below)    9/6/22, Interval History:  She returns today for follow up with continued left breast nodularity that is more prominent prior to menses.  She has been working on weight loss, exercising regularly with mild weight loss but she has noticed that her clothing fits better.    In early 8/22 she was seen by her GYN, Dr Soria with reports of an enlarging left breast mass. A left limited US on 8/29/22 confirmed nodular tissue at the site of palpable concern.    Bilateral diagnostic mammogram and left limited US on 8/29/22 was stable, BiRads 1.  (see full report below)      Review of Systems:  Review of Systems   Constitutional: Positive for fatigue and unexpected weight change (30 LBS OVER LAST 2 YRS).   Endocrine: Positive for hot flashes.   Neurological: Positive for dizziness, headaches and light-headedness.   Psychiatric/Behavioral: Positive for decreased concentration. The patient is nervous/anxious.    All other systems reviewed and are negative.       Past Medical and Surgical History:  Breast Biopsy History:  Patient has not had a breast biopsy in the past.  Breast Cancer HIstory:  Patient does not have a past medical history of breast cancer.  Breast Operations, and year:  NONE   Obstetric/Gynecologic History:  Age menstrual periods began: 13  Number of pregnancies:1  Number of live births: 1  Number of abortions or miscarriages: 0  Age of delivery of first child: 18  Patient did not breast feed.  Length of time taking birth control pills: 1 YR  Patient has never taken hormone replacement  PATIENT HAS BOTH  "OVARIES AND UTERUS.     Past Surgical History:   Procedure Laterality Date   • ADENOIDECTOMY     •  SECTION     • CHOLECYSTECTOMY     • MIDDLE EAR SURGERY     • MYRINGOTOMY W/ TUBES     • TONSILLECTOMY       Past Medical History:   Diagnosis Date   • Abnormal Pap smear of cervix    • Allergic rhinitis    • Anxiety    • Carpal tunnel syndrome, right upper limb    • Chronic migraine without aura, not intractable    • GERD without esophagitis    • HPV (human papilloma virus) infection    • Insomnia    • Ovarian cyst    • Panic disorder without agoraphobia    • PONV (postoperative nausea and vomiting)    • Spinal headache        Prior Hospitalizations, other than for surgery or childbirth, and year:  NONE     Social History     Socioeconomic History   • Marital status: Single   Tobacco Use   • Smoking status: Former Smoker     Packs/day: 0.00     Years: 0.50     Pack years: 0.00     Quit date: 2018     Years since quittin.5   • Smokeless tobacco: Never Used   • Tobacco comment: quit 3 months ago   Substance and Sexual Activity   • Alcohol use: Yes     Alcohol/week: 0.0 standard drinks     Comment: Socially drinks   • Drug use: No   • Sexual activity: Yes     Partners: Male     Birth control/protection: I.U.D.     Comment: Pill     Patient is .  Patient is employed full time with the following occupation:   Patient drinks 1 servings of caffeine per day.    Family History:  Family History   Problem Relation Age of Onset   • Hypertension Mother    • Heart failure Father    • Heart disease Father    • Hypertension Father    • Diabetes Paternal Aunt    • Diabetes Paternal Uncle    • Stroke Maternal Grandmother    • Heart disease Paternal Grandfather    • Diabetes Paternal Uncle        Vital Signs:  /87 (BP Location: Right arm)   Ht 154.9 cm (61\")   Wt 102 kg (224 lb)   BMI 42.32 kg/m²      Medications:    Current Outpatient Medications:   •  melatonin 5 MG tablet tablet, Take 5 " "mg by mouth., Disp: , Rfl:   •  metoclopramide (REGLAN) 10 MG tablet, Take 10 mg by mouth., Disp: , Rfl:   •  naproxen sodium (ALEVE) 220 MG tablet, Take 220 mg by mouth 2 (Two) Times a Day As Needed., Disp: , Rfl:   •  norethindrone-ethinyl estradiol FE (Junel FE 1/20) 1-20 MG-MCG per tablet, Take 1 tablet by mouth Daily., Disp: 84 tablet, Rfl: 0  •  ondansetron ODT (ZOFRAN-ODT) 4 MG disintegrating tablet, Place 1 tablet on the tongue Every 8 (Eight) Hours As Needed for Nausea or Vomiting., Disp: 15 tablet, Rfl: 1  •  phentermine 37.5 MG capsule, Take 37.5 mg by mouth., Disp: , Rfl:   •  propranolol (INDERAL) 20 MG tablet, Take 40 mg by mouth., Disp: , Rfl:   •  rizatriptan (MAXALT) 10 MG tablet, Take 10 mg by mouth., Disp: , Rfl:      Allergies:  Allergies   Allergen Reactions   • Prednisone Hives       Physical Examination:  /87 (BP Location: Right arm)   Ht 154.9 cm (61\")   Wt 102 kg (224 lb)   BMI 42.32 kg/m²      I reviewed physical exam, no changes noted    General Appearance:  Patient is in no distress.  She is well kept and has an morbidly obese build.   Psychiatric:  Patient with appropriate mood and affect. Alert and oriented to self, time, and place.    Breast, RIGHT:  small sized, asymmetric with the contralateral side, right > left.  Breast skin is without erythema, edema, rashes.  There are no visible abnormalities upon inspection during the arm-raising maneuver or with hands on hips in the sitting position. There is no nipple retraction, discharge or nipple/areolar skin changes.There are no masses palpable in the sitting or supine positions.    Breast, LEFT:  small sized,asymmetric with the contralateral side, right > left.  Breast skin is without erythema, edema, rashes.  There are no visible abnormalities upon inspection during the arm-raising maneuver or with hands on hips in the sitting position. There is no nipple retraction, discharge or nipple/areolar skin changes.     There is a " palpable area of spindle-shaped dense breast tissue at the 6:00, 2.5 cm from the nipple of area of interest to the patient.  Smoothly marginated. This is clinically consistent with superficial and dense breast tissue and a stable finding.  There are no other masses palpable in the sitting or supine positions.    Lymphatic:  There is no axillary, cervical, infraclavicular, or supraclavicular adenopathy bilaterally.  Musculoskeletal:  Good strength in all 4 extremities.   There is good range of motion in both shoulders.    Skin:  No new skin lesions or rashes on the skin excluding the breast (see breast exam above).        Imagin2021: left limited US at [facility]  CLINICAL INDICATION: 30 years old woman presents for diagnostic evaluation of a palpable left breast lump, which she states may have gotten slightly larger since it was previously evaluated in 2020.    COMPARISON: Limited left breast ultrasound 3/3/2020   FINDINGS:  Targeted sonographic evaluation of the left breast was performed at 6:00 in the area of concern indicated by the patient. Fat and areas of dense fibroglandular tissue are identified without a suspicious cystic or solid mass.   IMPRESSION:  Fat and dense fibroglandular tissue are identified in the area of concern indicated by the patient at 6:00 in the left breast. Further management should be based on clinical assessment. Recommend annual bilateral screening mammogram beginning at age 40 or sooner as directed by clinical risk assessment.  BI-RADS Category 1: Negative     2022: bilateral diagnostic mammogram with tomosynthesis, left limited US at [facility]  HISTORY: Palpable lump in left breast near 5 o'clock.      Standard images and R2 computerized assisted detection were obtained followed by digital Tomosynthesis and limited directed left breast ultrasound.   There is scattered fibroglandular density which is symmetric. A skin marker placed in the area of clinical concern in  the  left breast shows no mammographic abnormality. The limited directed left breast ultrasound also shows no abnormality which corresponds to the 5 o'clock region. The patient also has previous left breast ultrasound dated 04/26/2021 and showing no abnormality in the area of concern at that time which was in the 6 o'clock region.   CONCLUSION: Negative mammogram and negative directed left breast ultrasound as described above.   BI-RADS 1. Negative.        Assessment:  1- left breast mass, stable with negative imaging  Left limited US 4/2021, negative BiRads 1, bilateral diagnostic mammogram and left limited US 8/22, negative, BiRAds 1    2-Obesity  BMI 43    3-fibrocystic breast changes  Cyclical    Discussion:  Recent imaging findings were reviewed, a copy was given.  The palpable area of concern in the left breast is c/w nodular breast tissue on exam and in imaging.      BMI is 43  Patient was counseled on the importance of avoidance of obesity for a number of health issues including breast cancer. Diet changes and exercise were recommended.   Class 3 Severe Obesity (BMI >=40). Obesity-related health conditions include the following: none. Obesity is unchanged. BMI is is above average; BMI management plan is completed. We discussed portion control and increasing exercise.      Plan:    No further imaging or clinical follow up is recommended at this time, patient is in agreement with this plan.    She will continue diet and exercise efforts.    I asked Theresa to continue her self breast exam and to call us in the interim with concerns would be happy to see her back as necessary.  She will start her screening mammogram when age appropriate.      Brooke Simpson, KEVEN      CC:    Dr Yang Grady/transcription disclaimer:  Dictated using Dragon dictation

## 2023-01-03 ENCOUNTER — TELEPHONE (OUTPATIENT)
Dept: OBSTETRICS AND GYNECOLOGY | Facility: CLINIC | Age: 33
End: 2023-01-03
Payer: COMMERCIAL

## 2023-01-03 NOTE — TELEPHONE ENCOUNTER
Caller: Theresa SAUL    Relationship to patient: Self    Best call back number:[phone]    Patient is needing: PT WANTS TO KNOW IF SHE CAN BE SEEN SOONER THAN JAN 13 DUE TO HER THYROID ISSUES AND THE POSSIBLITY OF CAUSING MISCARRIAGE.

## 2023-01-04 ENCOUNTER — OFFICE VISIT (OUTPATIENT)
Dept: OBSTETRICS AND GYNECOLOGY | Facility: CLINIC | Age: 33
End: 2023-01-04
Payer: COMMERCIAL

## 2023-01-04 VITALS
HEIGHT: 61 IN | SYSTOLIC BLOOD PRESSURE: 108 MMHG | WEIGHT: 218.2 LBS | DIASTOLIC BLOOD PRESSURE: 72 MMHG | BODY MASS INDEX: 41.19 KG/M2

## 2023-01-04 DIAGNOSIS — Z98.890 HISTORY OF LOOP ELECTRICAL EXCISION PROCEDURE (LEEP): ICD-10-CM

## 2023-01-04 DIAGNOSIS — Z98.891 PREVIOUS CESAREAN SECTION: ICD-10-CM

## 2023-01-04 DIAGNOSIS — N92.6 MISSED MENSES: Primary | ICD-10-CM

## 2023-01-04 DIAGNOSIS — Z86.39 HISTORY OF HASHIMOTO THYROIDITIS: ICD-10-CM

## 2023-01-04 DIAGNOSIS — Z86.79 HISTORY OF ATRIAL TACHYCARDIA: ICD-10-CM

## 2023-01-04 LAB
B-HCG UR QL: POSITIVE
BILIRUB BLD-MCNC: NEGATIVE MG/DL
CLARITY, POC: CLEAR
COLOR UR: YELLOW
EXPIRATION DATE: ABNORMAL
GLUCOSE UR STRIP-MCNC: NEGATIVE MG/DL
HCG INTACT+B SERPL-ACNC: 251 MIU/ML
INTERNAL NEGATIVE CONTROL: ABNORMAL
INTERNAL POSITIVE CONTROL: ABNORMAL
KETONES UR QL: NEGATIVE
LEUKOCYTE EST, POC: NEGATIVE
Lab: ABNORMAL
NITRITE UR-MCNC: NEGATIVE MG/ML
PH UR: 5 [PH] (ref 5–8)
PROT UR STRIP-MCNC: NEGATIVE MG/DL
RBC # UR STRIP: NEGATIVE /UL
SP GR UR: 1 (ref 1–1.03)
UROBILINOGEN UR QL: NORMAL

## 2023-01-04 PROCEDURE — 81025 URINE PREGNANCY TEST: CPT | Performed by: OBSTETRICS & GYNECOLOGY

## 2023-01-04 PROCEDURE — 81002 URINALYSIS NONAUTO W/O SCOPE: CPT | Performed by: OBSTETRICS & GYNECOLOGY

## 2023-01-04 PROCEDURE — 99213 OFFICE O/P EST LOW 20 MIN: CPT | Performed by: OBSTETRICS & GYNECOLOGY

## 2023-01-04 RX ORDER — KETOCONAZOLE 20 MG/ML
SHAMPOO TOPICAL
COMMUNITY
Start: 2022-09-16 | End: 2023-09-16

## 2023-01-04 NOTE — PROGRESS NOTES
Theresa SAUL is a 32 y.o. patient who presents for follow up of   Chief Complaint   Patient presents with   • Menstrual Problem     CONFIRMATION OF PREGNANCY LMP: 11/18/22       33 yo est pt here for confirmation of pregnancy. She has irregular LMP and her last LMP was 11/8/2022, which would make her 8.1 weeks by dates.  Her first + UPT at home was 3 days ago. Her UPT is a faint positive today. Her US today shows an 8.4 cm uterus and EL= 0.7 cm. There are no embryonic structures seen today. She is A+ by her history. She denies any VB or pain. We discussed that she will need to be followed every 48 hours by quants until her levels are in the discriminatory zone for US or follow until they are at zero and she voices understanding.       The following portions of the patient's history were reviewed and updated as appropriate: allergies, current medications and problem list.    Review of Systems   Constitutional: Positive for activity change.   Gastrointestinal: Negative for abdominal pain.   Genitourinary: Negative for menstrual problem, vaginal bleeding and vaginal discharge.   Musculoskeletal: Negative for back pain.       /72   Ht 154.9 cm (61\")   Wt 99 kg (218 lb 3.2 oz)   LMP 11/18/2022 (Approximate)   BMI 41.23 kg/m²     Physical Exam  Vitals and nursing note reviewed.   Constitutional:       Appearance: Normal appearance. She is well-developed. She is obese.   HENT:      Head: Normocephalic and atraumatic.   Eyes:      General: No scleral icterus.     Conjunctiva/sclera: Conjunctivae normal.   Neck:      Thyroid: No thyromegaly.   Skin:     General: Skin is warm and dry.   Neurological:      Mental Status: She is alert and oriented to person, place, and time.   Psychiatric:         Mood and Affect: Mood normal.         Behavior: Behavior normal.         Thought Content: Thought content normal.         Judgment: Judgment normal.         A/P:  1. Missed menses- check quant BCHG today and in 48  hours. Follow until IUP confirmed on US.   2. H/O LEEP- will need CL in second trimester  3. Inappropriate atrial tachycardia- she is on propranolol.  4. H/O Hashimoto's thyroiditis.   5. H/O C/S x 1 - - get old chart.   6.   Assessment & Plan   Diagnoses and all orders for this visit:    1. Missed menses (Primary)  -     POC Urinalysis Dipstick  -     POC Pregnancy, Urine  -     HCG, B-subunit, Quantitative    2. Previous  section    3. History of loop electrical excision procedure (LEEP)    4. History of Hashimoto thyroiditis    5. History of atrial tachycardia                 No follow-ups on file.      Heather Ng MD    2023  21:34 EST

## 2023-01-06 ENCOUNTER — LAB (OUTPATIENT)
Dept: OBSTETRICS AND GYNECOLOGY | Facility: CLINIC | Age: 33
End: 2023-01-06
Payer: COMMERCIAL

## 2023-01-06 DIAGNOSIS — N92.6 MISSED MENSES: Primary | ICD-10-CM

## 2023-01-07 LAB — HCG INTACT+B SERPL-ACNC: 499 MIU/ML

## 2023-01-09 ENCOUNTER — TELEPHONE (OUTPATIENT)
Dept: OBSTETRICS AND GYNECOLOGY | Facility: CLINIC | Age: 33
End: 2023-01-09
Payer: COMMERCIAL

## 2023-01-09 NOTE — TELEPHONE ENCOUNTER
Pt called inquiring on her lab results. Does pt need to make and appointment or does she need to have her quant checked again? TANNER

## 2023-01-09 NOTE — PROGRESS NOTES
PIP= quant is 499, which is doubling appropriately. Repeat quant on Tuesday or WEd of this week and if still rising appropriately we will get her on the schedule for a confirmation and US visit when she is 6-8 weeks. [place]

## 2023-01-12 ENCOUNTER — LAB (OUTPATIENT)
Dept: OBSTETRICS AND GYNECOLOGY | Facility: CLINIC | Age: 33
End: 2023-01-12
Payer: COMMERCIAL

## 2023-01-12 ENCOUNTER — LAB (OUTPATIENT)
Dept: LAB | Facility: HOSPITAL | Age: 33
End: 2023-01-12
Payer: COMMERCIAL

## 2023-01-12 DIAGNOSIS — N92.6 MISSED MENSES: Primary | ICD-10-CM

## 2023-01-12 PROCEDURE — 84702 CHORIONIC GONADOTROPIN TEST: CPT | Performed by: OBSTETRICS & GYNECOLOGY

## 2023-01-13 LAB — HCG INTACT+B SERPL-ACNC: 5802 MIU/ML

## 2023-01-19 ENCOUNTER — OFFICE VISIT (OUTPATIENT)
Dept: OBSTETRICS AND GYNECOLOGY | Facility: CLINIC | Age: 33
End: 2023-01-19
Payer: COMMERCIAL

## 2023-01-19 VITALS
DIASTOLIC BLOOD PRESSURE: 86 MMHG | SYSTOLIC BLOOD PRESSURE: 110 MMHG | HEIGHT: 61 IN | WEIGHT: 222.4 LBS | BODY MASS INDEX: 41.99 KG/M2

## 2023-01-19 DIAGNOSIS — N92.6 MISSED MENSES: Primary | ICD-10-CM

## 2023-01-19 LAB
B-HCG UR QL: POSITIVE
EXPIRATION DATE: ABNORMAL
HCG INTACT+B SERPL-ACNC: NORMAL MIU/ML
INTERNAL NEGATIVE CONTROL: NEGATIVE
INTERNAL POSITIVE CONTROL: POSITIVE
Lab: 55
T3 SERPL-MCNC: 129 NG/DL (ref 80–200)
T4 FREE SERPL-MCNC: 1.3 NG/DL (ref 0.93–1.7)
TSH SERPL DL<=0.005 MIU/L-ACNC: 1.43 UIU/ML (ref 0.27–4.2)

## 2023-01-19 PROCEDURE — 81025 URINE PREGNANCY TEST: CPT | Performed by: STUDENT IN AN ORGANIZED HEALTH CARE EDUCATION/TRAINING PROGRAM

## 2023-01-19 PROCEDURE — 99214 OFFICE O/P EST MOD 30 MIN: CPT | Performed by: STUDENT IN AN ORGANIZED HEALTH CARE EDUCATION/TRAINING PROGRAM

## 2023-01-19 NOTE — PROGRESS NOTES
"      Theresa SAUL is a 32 y.o. patient who presents for follow up of new OB vs conformation. LMP November but unsure. Did have an US off site that may have shown cardiac activity. Denies VB.     Hcg Quant   12Jan 5802  6Jan 499  4Jan 251    Initial Appt: 33 yo est pt here for confirmation of pregnancy. She has irregular LMP and her last LMP was 11/8/2022, which would make her 8.1 weeks by dates.  Her first + UPT at home was 3 days ago. Her UPT is a faint positive today. Her US today shows an 8.4 cm uterus and EL= 0.7 cm. There are no embryonic structures seen today. She is A+ by her history. She denies any VB or pain. We discussed that she will need to be followed every 48 hours by quants until her levels are in the discriminatory zone for US or follow until they are at zero and she voices understanding.       The following portions of the patient's history were reviewed and updated as appropriate: allergies, current medications and problem list.    Review of Systems   Constitutional: Positive for activity change.   Gastrointestinal: Negative for abdominal pain.   Genitourinary: Negative for menstrual problem, vaginal bleeding and vaginal discharge.   Musculoskeletal: Negative for back pain.       /86   Ht 154.9 cm (60.98\")   Wt 101 kg (222 lb 6.4 oz)   LMP 11/18/2022 (Approximate)   BMI 42.04 kg/m²     Physical Exam  Vitals and nursing note reviewed.   Constitutional:       Appearance: Normal appearance. She is well-developed. She is obese.   HENT:      Head: Normocephalic and atraumatic.   Eyes:      General: No scleral icterus.     Conjunctiva/sclera: Conjunctivae normal.   Neck:      Thyroid: No thyromegaly.   Skin:     General: Skin is warm and dry.   Neurological:      Mental Status: She is alert and oriented to person, place, and time.   Psychiatric:         Mood and Affect: Mood normal.         Behavior: Behavior normal.         Thought Content: Thought content normal.         Judgment: Judgment " normal.       Possible IUP with GS and possible YS. No fetal pole seen   MAYELA noted 1.4x 1.5cm   R ovary not seen  L ovary WNL     No free fluid           Assessment & Plan   Diagnoses and all orders for this visit:    1. Missed menses (Primary)  -     POC Pregnancy, Urine  -     T3  -     T4, Free  -     TSH  -     HCG, B-subunit, Quantitative    1. Missed menses- Hcg pending. US with GS and possible YS. No FCA      2. H/O LEEP- will need CL in second trimester    3. Inappropriate atrial tachycardia ( SVT) - she is on propranolol.  4. H/O Hashimoto's thyroiditis. Thyroid panel today     5. H/O C/S x 1 2008- Op report needed. 40wga Pre-e SF probable LTCS     F/u next wek for US; labs pending. Continue PNV. All questions answered.          Return in about 8 days (around 1/27/2023), or TVUS; Viability.      Williams Ruiz, DO    1/19/2023  11:17 EST

## 2023-01-26 ENCOUNTER — OFFICE VISIT (OUTPATIENT)
Dept: OBSTETRICS AND GYNECOLOGY | Facility: CLINIC | Age: 33
End: 2023-01-26
Payer: COMMERCIAL

## 2023-01-26 VITALS
HEIGHT: 61 IN | SYSTOLIC BLOOD PRESSURE: 112 MMHG | WEIGHT: 225.6 LBS | BODY MASS INDEX: 42.59 KG/M2 | DIASTOLIC BLOOD PRESSURE: 64 MMHG

## 2023-01-26 DIAGNOSIS — N92.6 MISSED MENSES: Primary | ICD-10-CM

## 2023-01-26 DIAGNOSIS — Z32.01 PREGNANCY EXAMINATION OR TEST, POSITIVE RESULT: ICD-10-CM

## 2023-01-26 PROCEDURE — 99213 OFFICE O/P EST LOW 20 MIN: CPT | Performed by: STUDENT IN AN ORGANIZED HEALTH CARE EDUCATION/TRAINING PROGRAM

## 2023-01-26 NOTE — PROGRESS NOTES
Today she feels her LMP is [id number]. She would be   6+2. Has had no more VB.     Last Appt:     Theresa SAUL is a 32 y.o. patient who presents for follow up of new OB vs conformation. LMP November but unsure. Did have an US off site that may have shown cardiac activity. Denies VB.     Hcg Quant   [id number]  12Jan 5802  6Jan 499  4Jan 251        The following portions of the patient's history were reviewed and updated as appropriate: allergies, current medications and problem list.    Review of Systems   Constitutional: Positive for activity change.   Gastrointestinal: Negative for abdominal pain.   Genitourinary: Negative for menstrual problem, vaginal bleeding and vaginal discharge.   Musculoskeletal: Negative for back pain.     LMP [id number]    Physical Exam  Vitals and nursing note reviewed.   Constitutional:       Appearance: Normal appearance. She is well-developed. She is obese.   HENT:      Head: Normocephalic and atraumatic.   Eyes:      General: No scleral icterus.     Conjunctiva/sclera: Conjunctivae normal.   Neck:      Thyroid: No thyromegaly.   Skin:     General: Skin is warm and dry.   Neurological:      Mental Status: She is alert and oriented to person, place, and time.   Psychiatric:         Mood and Affect: Mood normal.         Behavior: Behavior normal.         Thought Content: Thought content normal.         Judgment: Judgment normal.       US today:     GS with YS IUP     FCA: 130's  Ovaries WNL   LMP [id number] 6+2  DUS [id number] 6+4   SALVATORE [id number]       Assessment & Plan   Diagnoses and all orders for this visit:    1. Missed menses (Primary)    2. Pregnancy examination or test, positive result    1. 6+2 wga by LMP and confirmed with DUS. NOB in 4 weeks      2. H/O LEEP- will need CL in second trimester    3. Inappropriate atrial tachycardia ( SVT) - she is on propranolol.    4. H/O Hashimoto's thyroiditis. Thyroid panel today     5. H/O C/S x 1 2008- Op report needed. 40wga Pre-e SF probable  LTCS     F/u 4 weeks ; NOB, US.    Continue PNV. All questions answered.   May transfer to [facility] [place] as driving distance is much longer than her last pregnancy.     Recommend Dr Renny Feliciano [facility]           Williams Ruiz DO    [id number]   13:28 EST

## 2023-07-11 PROBLEM — Z3A.33 33 WEEKS GESTATION OF PREGNANCY: Status: ACTIVE | Noted: 2023-07-11

## 2023-07-11 PROBLEM — N63.0 LUMP OR MASS IN BREAST: Status: RESOLVED | Noted: 2020-06-18 | Resolved: 2023-07-11

## 2023-07-11 PROBLEM — N60.12 FIBROCYSTIC BREAST CHANGES, BILATERAL: Status: RESOLVED | Noted: 2021-05-03 | Resolved: 2023-07-11

## 2023-07-11 PROBLEM — E66.01 MORBID OBESITY: Status: RESOLVED | Noted: 2020-06-16 | Resolved: 2023-07-11

## 2023-07-11 PROBLEM — E06.3 HYPERTHYROIDISM WITH HASHIMOTO DISEASE: Status: ACTIVE | Noted: 2023-07-11

## 2023-07-11 PROBLEM — O34.211 MATERNAL CARE DUE TO LOW TRANSVERSE UTERINE SCAR FROM PREVIOUS CESAREAN DELIVERY: Status: ACTIVE | Noted: 2023-07-11

## 2023-07-11 PROBLEM — N92.6 MISSED MENSES: Status: RESOLVED | Noted: 2023-01-19 | Resolved: 2023-07-11

## 2023-07-11 PROBLEM — Z36.9 ENCOUNTER FOR ANTENATAL SCREENING, UNSPECIFIED: Status: ACTIVE | Noted: 2023-07-11

## 2023-07-11 PROBLEM — Z3A.33 33 WEEKS GESTATION OF PREGNANCY: Status: RESOLVED | Noted: 2023-07-11 | Resolved: 2023-07-11

## 2023-07-11 PROBLEM — E05.80 HYPERTHYROIDISM WITH HASHIMOTO DISEASE: Status: ACTIVE | Noted: 2023-07-11

## 2023-07-11 PROBLEM — N60.11 FIBROCYSTIC BREAST CHANGES, BILATERAL: Status: RESOLVED | Noted: 2021-05-03 | Resolved: 2023-07-11

## 2023-07-11 PROBLEM — Z32.01 PREGNANCY EXAMINATION OR TEST, POSITIVE RESULT: Status: RESOLVED | Noted: 2023-01-26 | Resolved: 2023-07-11

## 2023-07-11 PROBLEM — O99.210 OBESITY IN PREGNANCY, ANTEPARTUM: Status: ACTIVE | Noted: 2023-07-11

## 2023-07-11 PROBLEM — Z3A.30 30 WEEKS GESTATION OF PREGNANCY: Status: ACTIVE | Noted: 2023-07-11

## 2023-07-17 ENCOUNTER — HOSPITAL ENCOUNTER (OUTPATIENT)
Facility: HOSPITAL | Age: 33
Discharge: HOME | End: 2023-07-17
Attending: OBSTETRICS & GYNECOLOGY | Admitting: OBSTETRICS & GYNECOLOGY
Payer: COMMERCIAL

## 2023-07-17 VITALS — HEART RATE: 76 BPM | DIASTOLIC BLOOD PRESSURE: 52 MMHG | RESPIRATION RATE: 16 BRPM | SYSTOLIC BLOOD PRESSURE: 96 MMHG

## 2023-07-17 PROCEDURE — 59025 FETAL NON-STRESS TEST: CPT

## 2023-07-17 PROCEDURE — G0463 HOSPITAL OUTPT CLINIC VISIT: HCPCS

## 2023-07-17 NOTE — NURSING NOTE
PT IS A  AT 30 6/7 WKS GEST WHO PRESENTS WITH C/O DECREASED FETAL MOVEMENT SINCE YESTERDAY. SHE DENIES CTX, LEAKING FLUID OR VAGINAL BLEEDING. HER ABDOMEN IS SOFT AND NONTENDER.

## 2023-07-17 NOTE — NON STRESS TEST
Theresa SAUL, a  at 30w6d with an SALVATORE of 2023, by Last Menstrual Period, was seen at [facility] for a nonstress test.    Chief Complaint   Patient presents with    Decreased Fetal Movement       Patient Active Problem List   Diagnosis    Atrial tachycardia    Cervical dysplasia    Hyperthyroidism with Hashimoto disease    Maternal care due to low transverse uterine scar from previous  delivery    Encounter for  screening, unspecified    Obesity in pregnancy, antepartum    30 weeks gestation of pregnancy       Start Time: 1130  Stop Time: 1212    Interpretation A  Nonstress Test Interpretation A: Reactive

## 2023-07-26 ENCOUNTER — ROUTINE PRENATAL (OUTPATIENT)
Dept: OBSTETRICS AND GYNECOLOGY | Facility: CLINIC | Age: 33
End: 2023-07-26
Payer: COMMERCIAL

## 2023-07-26 VITALS — SYSTOLIC BLOOD PRESSURE: 118 MMHG | WEIGHT: 276 LBS | DIASTOLIC BLOOD PRESSURE: 72 MMHG | BODY MASS INDEX: 52.18 KG/M2

## 2023-07-26 DIAGNOSIS — O34.211 MATERNAL CARE DUE TO LOW TRANSVERSE UTERINE SCAR FROM PREVIOUS CESAREAN DELIVERY: ICD-10-CM

## 2023-07-26 DIAGNOSIS — E05.80 HYPERTHYROIDISM WITH HASHIMOTO DISEASE: ICD-10-CM

## 2023-07-26 DIAGNOSIS — Z34.93 PRENATAL CARE IN THIRD TRIMESTER: Primary | ICD-10-CM

## 2023-07-26 DIAGNOSIS — O99.210 OBESITY IN PREGNANCY, ANTEPARTUM: ICD-10-CM

## 2023-07-26 DIAGNOSIS — I47.1 ATRIAL TACHYCARDIA: ICD-10-CM

## 2023-07-26 DIAGNOSIS — R11.0 NAUSEA: ICD-10-CM

## 2023-07-26 DIAGNOSIS — E06.3 HYPERTHYROIDISM WITH HASHIMOTO DISEASE: ICD-10-CM

## 2023-07-26 DIAGNOSIS — Z36.9 ENCOUNTER FOR ANTENATAL SCREENING, UNSPECIFIED: ICD-10-CM

## 2023-07-26 LAB
BILIRUB BLD-MCNC: NEGATIVE MG/DL
CLARITY, POC: CLEAR
COLOR UR: YELLOW
GLUCOSE UR STRIP-MCNC: NEGATIVE MG/DL
KETONES UR QL: NEGATIVE
LEUKOCYTE EST, POC: NEGATIVE
NITRITE UR-MCNC: NEGATIVE MG/ML
PH UR: 5 [PH] (ref 5–8)
PROT UR STRIP-MCNC: NEGATIVE MG/DL
RBC # UR STRIP: NEGATIVE /UL
SP GR UR: 1 (ref 1–1.03)
UROBILINOGEN UR QL: NORMAL

## 2023-07-26 RX ORDER — ONDANSETRON 4 MG/1
4 TABLET, ORALLY DISINTEGRATING ORAL EVERY 8 HOURS PRN
Qty: 15 TABLET | Refills: 1 | Status: SHIPPED | OUTPATIENT
Start: 2023-07-26

## 2023-07-26 RX ORDER — LABETALOL 100 MG/1
100 TABLET, FILM COATED ORAL EVERY MORNING
Qty: 30 TABLET | Refills: 6 | Status: CANCELLED | OUTPATIENT
Start: 2023-07-26

## 2023-07-26 NOTE — PROGRESS NOTES
"OB follow up > 20 weeks    Chief Complaint   Patient presents with    Routine Prenatal Visit       Theresa SAUL is a 33 y.o.  32w1d being seen today for her obstetrical visit.  This patient is new to me - yes.  Patient reports no complaints.  FM+. Taking prenatal vitamins, asa and propanolol for SVT's. Questions if she should switch to Labetalol.       Review of Systems  Genitourinary: Negative for contractions, cramping, vaginal bleeding, or SROM.   Fetal movement: normal      Allergies   Allergen Reactions    Prednisone Hives    Hydromorphone Other (See Comments)     Pt states that she had a \"yucky feeling\" for days after the last time she had dilaudid.        /72   Wt 125 kg (276 lb)   LMP 2022   BMI 52.18 kg/m²     FHT:  133 BPM   Uterine Size:   EFW 83%       Assessment    1) pregnancy at 32w1d     2) Tdap received      3) ? CF/SMA/FX ( Pt stated all normal)      4) H/O LEEP- Had CL in second trimester     5) H/O Hashimoto's thyroiditis- TPO elevated; other thyroid labs WNL      6) Atrial tachycardia (SVT) - managed by cardio; per Dr. Wiley Koch, can take either Propranolol or Labetolol; she is on propranolol. Takes 40 mg in the morning; 20 mg in evening PRN     7) Negative per pt NIPT and AFP  ( Conformation pending)      8) H/O C/S x 1 - Op report needed. 40wga Pre-e SF  LTCS   Desires      9) Obesity in Pregnancy- EFW today @ 32 wga (83%), @ 36wga ()  NST 34wga ()         Plan    Continue prenatal vitamins  Reviewed this stage of pregnancy  Problem list updated   Follow up in 2 weeks for OBT, NST    Misti Wilder, APRN  2023  13:54 EDT   "

## 2023-08-06 ENCOUNTER — HOSPITAL ENCOUNTER (OUTPATIENT)
Facility: HOSPITAL | Age: 33
Discharge: HOME | End: 2023-08-06
Attending: STUDENT IN AN ORGANIZED HEALTH CARE EDUCATION/TRAINING PROGRAM | Admitting: STUDENT IN AN ORGANIZED HEALTH CARE EDUCATION/TRAINING PROGRAM
Payer: COMMERCIAL

## 2023-08-06 VITALS
SYSTOLIC BLOOD PRESSURE: 112 MMHG | HEIGHT: 61 IN | WEIGHT: 276 LBS | RESPIRATION RATE: 18 BRPM | BODY MASS INDEX: 52.11 KG/M2 | HEART RATE: 83 BPM | TEMPERATURE: 98.4 F | DIASTOLIC BLOOD PRESSURE: 60 MMHG

## 2023-08-06 PROBLEM — Z34.90 PREGNANCY: Status: ACTIVE | Noted: 2023-08-06

## 2023-08-06 LAB
A1 MICROGLOB PLACENTAL VAG QL: NEGATIVE
AMPHET+METHAMPHET UR QL: NEGATIVE
AMPHETAMINES UR QL: NEGATIVE
BACTERIA UR QL AUTO: ABNORMAL /HPF
BARBITURATES UR QL SCN: NEGATIVE
BENZODIAZ UR QL SCN: NEGATIVE
BILIRUB UR QL STRIP: NEGATIVE
BUPRENORPHINE SERPL-MCNC: NEGATIVE NG/ML
CANNABINOIDS SERPL QL: NEGATIVE
CLARITY UR: CLEAR
COCAINE UR QL: NEGATIVE
COLOR UR: YELLOW
GLUCOSE UR STRIP-MCNC: NEGATIVE MG/DL
HGB UR QL STRIP.AUTO: ABNORMAL
HYALINE CASTS UR QL AUTO: ABNORMAL /LPF
KETONES UR QL STRIP: ABNORMAL
LEUKOCYTE ESTERASE UR QL STRIP.AUTO: NEGATIVE
METHADONE UR QL SCN: NEGATIVE
NITRITE UR QL STRIP: NEGATIVE
OPIATES UR QL: NEGATIVE
OXYCODONE UR QL SCN: NEGATIVE
PCP UR QL SCN: NEGATIVE
PH UR STRIP.AUTO: 6.5 [PH] (ref 4.5–8)
PROPOXYPH UR QL: NEGATIVE
PROT UR QL STRIP: NEGATIVE
RBC # UR STRIP: ABNORMAL /HPF
REF LAB TEST METHOD: ABNORMAL
SP GR UR STRIP: 1.02 (ref 1–1.03)
SQUAMOUS #/AREA URNS HPF: ABNORMAL /HPF
TRICYCLICS UR QL SCN: NEGATIVE
UROBILINOGEN UR QL STRIP: ABNORMAL
WBC # UR STRIP: ABNORMAL /HPF

## 2023-08-06 PROCEDURE — 80306 DRUG TEST PRSMV INSTRMNT: CPT | Performed by: STUDENT IN AN ORGANIZED HEALTH CARE EDUCATION/TRAINING PROGRAM

## 2023-08-06 PROCEDURE — 81001 URINALYSIS AUTO W/SCOPE: CPT | Performed by: STUDENT IN AN ORGANIZED HEALTH CARE EDUCATION/TRAINING PROGRAM

## 2023-08-06 PROCEDURE — G0463 HOSPITAL OUTPT CLINIC VISIT: HCPCS

## 2023-08-06 PROCEDURE — 59025 FETAL NON-STRESS TEST: CPT | Performed by: STUDENT IN AN ORGANIZED HEALTH CARE EDUCATION/TRAINING PROGRAM

## 2023-08-06 PROCEDURE — 59025 FETAL NON-STRESS TEST: CPT

## 2023-08-06 PROCEDURE — 84112 EVAL AMNIOTIC FLUID PROTEIN: CPT | Performed by: STUDENT IN AN ORGANIZED HEALTH CARE EDUCATION/TRAINING PROGRAM

## 2023-08-06 NOTE — NURSING NOTE
Pt arrives to LDRT#1 with c/o vaginal fluid leakage since last night.  Reports active FM.  Denies vaginal bleeding.  Denies abd cramping, ctxs, stomach tightening or lower back pain.  Denies burning or foul odor with urination. Abd soft & non tender.  Monitors placed.  Will evaluate

## 2023-08-06 NOTE — NON STRESS TEST
Theresa SAUL, a  at 33w5d with an SALVATORE of 2023, by Last Menstrual Period, was seen at [facility] OB [facility] for a nonstress test.    Chief Complaint   Patient presents with    Rupture of Membranes       Patient Active Problem List   Diagnosis    Atrial tachycardia    Cervical dysplasia    Hyperthyroidism with Hashimoto disease    Maternal care due to low transverse uterine scar from previous  delivery    Encounter for  screening, unspecified    Obesity in pregnancy, antepartum    30 weeks gestation of pregnancy    Pregnancy       Start Time: 1128  Stop Time: 1150    Interpretation A  Nonstress Test Interpretation A: Reactive

## 2023-08-10 ENCOUNTER — ROUTINE PRENATAL (OUTPATIENT)
Dept: OBSTETRICS AND GYNECOLOGY | Facility: CLINIC | Age: 33
End: 2023-08-10
Payer: COMMERCIAL

## 2023-08-10 VITALS — WEIGHT: 275.6 LBS | DIASTOLIC BLOOD PRESSURE: 78 MMHG | SYSTOLIC BLOOD PRESSURE: 120 MMHG | BODY MASS INDEX: 52.07 KG/M2

## 2023-08-10 DIAGNOSIS — Z3A.34 34 WEEKS GESTATION OF PREGNANCY: ICD-10-CM

## 2023-08-10 DIAGNOSIS — Z36.9 ENCOUNTER FOR ANTENATAL SCREENING, UNSPECIFIED: Primary | ICD-10-CM

## 2023-08-10 DIAGNOSIS — I47.1 ATRIAL TACHYCARDIA: ICD-10-CM

## 2023-08-10 DIAGNOSIS — O99.210 OBESITY IN PREGNANCY, ANTEPARTUM: ICD-10-CM

## 2023-08-10 DIAGNOSIS — O34.211 MATERNAL CARE DUE TO LOW TRANSVERSE UTERINE SCAR FROM PREVIOUS CESAREAN DELIVERY: ICD-10-CM

## 2023-08-10 DIAGNOSIS — E06.3 HYPERTHYROIDISM WITH HASHIMOTO DISEASE: ICD-10-CM

## 2023-08-10 DIAGNOSIS — E05.80 HYPERTHYROIDISM WITH HASHIMOTO DISEASE: ICD-10-CM

## 2023-08-10 PROBLEM — Z3A.30 30 WEEKS GESTATION OF PREGNANCY: Status: RESOLVED | Noted: 2023-07-11 | Resolved: 2023-08-10

## 2023-08-10 RX ORDER — ASPIRIN 81 MG/1
1 TABLET ORAL DAILY
COMMUNITY
Start: 2023-08-10

## 2023-08-10 RX ORDER — HYDROXYZINE HYDROCHLORIDE 10 MG/1
10 TABLET, FILM COATED ORAL 3 TIMES DAILY PRN
Qty: 30 TABLET | Refills: 0 | Status: SHIPPED | OUTPATIENT
Start: 2023-08-10

## 2023-08-10 RX ORDER — LANOLIN ALCOHOL/MO/W.PET/CERES
1 CREAM (GRAM) TOPICAL DAILY
Qty: 30 TABLET | Refills: 1 | Status: SHIPPED | OUTPATIENT
Start: 2023-08-10

## 2023-08-10 NOTE — PROGRESS NOTES
Ob follow up      Theresa SAUL is a 33 y.o.  34+ patient being seen today for her obstetrical visit. Patient reports no complaints. Fetal movement: normal. Went to L&D triage for SROM this am. Desires to . States all genetic testing normal. On inderal for known tachycardia       ROS - Denies leaking fluid, vaginal bleeding and notes good fetal movement.     /78   Wt 125 kg (275 lb 9.6 oz)   LMP 2022   BMI 52.07 kg/mý       Vitals: VSS; AF    General Appearance:  Awake. Alert. Well developed. Well nourished. In no acute distress.    Visual Inspection: ø Abdomen was normal on visual inspection.  Palpation: ø Abdomen was soft. ø Abdominal non-tender.    Uterus: ø Fundal height was normal for gestational age. ø Not tender.  Uterine Adnexae: ø Normal without masses or tenderness.  Neurological:  ø Oriented to time, place, and person.  Skin:  ø General appearance was normal. No bruising or ecchymosis.  Obstetrical: +FM and FCA     A/P      1) 34yo  @ 34+ by LMP      2) Tdap received      3) ? CF/SMA/FX ( Pt stated all normal)      4) H/O LEEP- Had CL in second trimester     5) H/O Hashimoto's thyroiditis. Thyroid panel today      6) Atrial tachycardia ( SVT) - she is on propranolol.     7) Negative per pt NIPT and AFP      8) H/O C/S x 1 - Op report needed. 40wga Pre-e SF  LTCS   Desires      There is a 60-80% success rate of vaginal delivery after a previous  delivery, although it is not risk free.    The benefits of a successful vaginal delivery are: no surgical operation, usually fewer complications compared to the complications of a repeat  delivery. In addition, the average hospitalization after a vaginal delivery is one or two days, compared with an average hospitalization after a  delivery of four or more days. The process of a vaginal delivery is also more helpful than  delivery in stimulating a baby's first breaths.    The risk of attempting  vaginal delivery after a previous  delivery is that the scar on the uterus may separate during labor. This is known as uterine rupture and occurs in approximately 1 in 100 women with low transverse incision on the womb. Where the previous incision on the womb has been labeled unknown because medical records could not be obtained for review, then the risk of uterine rupture for women who have had two previous  deliveries is 2-3 per 100 women. Because of this risk, active labor will be observed in the hospital and special monitoring may be used during labor to help evaluate contractions and the infant's well being. If uterine rupture occurs, an urgent  delivery will be necessary. Uterine rupture can have negative effects for the infant or yourself. A blood transfusion due to excessive blood loss, in rare cases, the removal of the uterus (hysterectomy) may be necessary. One or two in 1000 women under going a trial of labor after a previous  delivery may deliver an infant with problems related to a slow heart rate just before delivery as a result of uterine rupture.     9) Obesity in Pregnancy   NST 34wga   Nst today reactive > 20 minutes      10)Reviewed this stage of pregnancy     9) Declines flu vaccine      11)Problem list updated      12) OB, NST, GBS , Growth @ 36wga     Pt is reasonable if no labor by 40wga consider RLTCS ( Declines sterilization; spouse vasectomy)       I spent 30 minutes caring for Theresa on this date of service. This time includes time spent by me in the following activities: preparing for the visit, reviewing tests, obtaining and/or reviewing a separately obtained history, performing a medically appropriate examination and/or evaluation, counseling and educating the patient/family/caregiver, ordering medications, tests, or procedures, documenting information in the medical record, independently interpreting results and communicating that information with the  patient/family/caregiver and care coordination     Williams Ruiz DO     8/10/2023  13:18 EDT

## 2023-08-17 ENCOUNTER — ROUTINE PRENATAL (OUTPATIENT)
Dept: OBSTETRICS AND GYNECOLOGY | Facility: CLINIC | Age: 33
End: 2023-08-17
Payer: COMMERCIAL

## 2023-08-17 VITALS — WEIGHT: 276 LBS | SYSTOLIC BLOOD PRESSURE: 122 MMHG | DIASTOLIC BLOOD PRESSURE: 78 MMHG | BODY MASS INDEX: 52.15 KG/M2

## 2023-08-17 DIAGNOSIS — Z36.85 ANTENATAL SCREENING FOR STREPTOCOCCUS B: ICD-10-CM

## 2023-08-17 DIAGNOSIS — Z34.93 PRENATAL CARE IN THIRD TRIMESTER: Primary | ICD-10-CM

## 2023-08-17 DIAGNOSIS — Z36.9 ENCOUNTER FOR ANTENATAL SCREENING, UNSPECIFIED: ICD-10-CM

## 2023-08-17 NOTE — PROGRESS NOTES
"OB follow up > 20 weeks    Chief Complaint   Patient presents with    Routine Prenatal Visit    Non-stress Test       Theresa SAUL is a 33 y.o.  35w2d being seen today for her obstetrical visit.  Patient reports no complaints.  FM+. Taking prenatal vitamins, asa and propanolol for SVT's.       Review of Systems  Genitourinary: Negative for contractions, cramping, vaginal bleeding, or SROM.   Fetal movement: normal      Allergies   Allergen Reactions    Prednisone Hives    Hydromorphone Other (See Comments)     Pt states that she had a \"yucky feeling\" for days after the last time she had dilaudid.        LMP 2022     FHT:  Present    Uterine Size:  S=D        Assessment    1) pregnancy at 35w2d- NST reactive x 20 minutes. GBS collected today.     2) S/P tDap     3) ? CF/SMA/FX ( Pt stated all normal)      4) H/O LEEP- Had CL in second trimester     5) H/O Hashimoto's thyroiditis- TPO elevated; other thyroid labs WNL      6) Atrial tachycardia (SVT) - managed by cardio; per Dr. Wiley Koch, can take either Propranolol or Labetolol; she is on propranolol. Takes 40 mg in the morning; 20 mg in evening PRN     7) Negative per pt NIPT and AFP  ( Conformation pending)      8) H/O C/S x 1 - Op report needed. 41 1 IOL d/t GHTN with LTCS d/t fetal intolerance to labor. Desires - placed on MD list to disc.     9) Obesity in Pregnancy- EFW today @ 32 wga (83%), @ 36wga ()  NST 34wga (). Ok to stop ASA.          Plan    Continue prenatal vitamins  Reviewed this stage of pregnancy  Problem list updated   Follow up in 1 weeks for OBT, NST and US- growth     Madeline Lagos, KEVEN  2023  13:11 EDT   "

## 2023-08-21 LAB — B-HEM STREP SPEC QL CULT: NEGATIVE

## 2023-08-22 PROBLEM — Z87.59 HISTORY OF GESTATIONAL HYPERTENSION: Status: ACTIVE | Noted: 2023-08-22

## 2023-08-24 ENCOUNTER — ROUTINE PRENATAL (OUTPATIENT)
Dept: OBSTETRICS AND GYNECOLOGY | Facility: CLINIC | Age: 33
End: 2023-08-24
Payer: COMMERCIAL

## 2023-08-24 VITALS — SYSTOLIC BLOOD PRESSURE: 126 MMHG | WEIGHT: 281.4 LBS | BODY MASS INDEX: 53.17 KG/M2 | DIASTOLIC BLOOD PRESSURE: 82 MMHG

## 2023-08-24 DIAGNOSIS — Z36.9 ENCOUNTER FOR ANTENATAL SCREENING, UNSPECIFIED: Primary | ICD-10-CM

## 2023-08-24 DIAGNOSIS — E06.3 HYPERTHYROIDISM WITH HASHIMOTO DISEASE: ICD-10-CM

## 2023-08-24 DIAGNOSIS — I47.1 ATRIAL TACHYCARDIA: ICD-10-CM

## 2023-08-24 DIAGNOSIS — Z3A.36 36 WEEKS GESTATION OF PREGNANCY: ICD-10-CM

## 2023-08-24 DIAGNOSIS — O34.211 MATERNAL CARE DUE TO LOW TRANSVERSE UTERINE SCAR FROM PREVIOUS CESAREAN DELIVERY: ICD-10-CM

## 2023-08-24 DIAGNOSIS — E05.80 HYPERTHYROIDISM WITH HASHIMOTO DISEASE: ICD-10-CM

## 2023-08-24 DIAGNOSIS — Z87.59 HISTORY OF GESTATIONAL HYPERTENSION: ICD-10-CM

## 2023-08-24 DIAGNOSIS — O99.210 OBESITY IN PREGNANCY, ANTEPARTUM: ICD-10-CM

## 2023-08-24 NOTE — PROGRESS NOTES
Ob follow up      Theresa SAUL is a 33 y.o.  36+ patient being seen today for her obstetrical visit. Patient reports no complaints. Fetal movement: normal.        ROS - Denies leaking fluid, vaginal bleeding and notes good fetal movement.     /82   Wt 128 kg (281 lb 6.4 oz)   LMP 2022   BMI 53.17 kg/mý       Vitals: VSS; AF    General Appearance:  Awake. Alert. Well developed. Well nourished. In no acute distress.    Visual Inspection: ø Abdomen was normal on visual inspection.  Palpation: ø Abdomen was soft. ø Abdominal non-tender.    Uterus: ø Fundal height was normal for gestational age. ø Not tender.  Uterine Adnexae: ø Normal without masses or tenderness.  Neurological:  ø Oriented to time, place, and person.  Skin:  ø General appearance was normal. No bruising or ecchymosis.  Obstetrical: +FM and FCA     Presentation: VTX  Placenta: Anterior  GLENN: 10cm  FCA: 130's    EFW  3060g 6.12 61.6%         Ultrasound images and report reviewed personally by me.    Full interpretation of ultrasound can be found in the patient's note on the same date of service.     Williams Ruiz, DO      NST reactive > 20 minutes       A/P      1) 34yo  @ 36+ by LMP   GBS negative  NST/BPP 10/10     2) Tdap received      3) ? CF/SMA/FX ( Pt stated all normal)      4) H/O LEEP- Had CL in second trimester     5) H/O Hashimoto's thyroiditis. Thyroid panel today      6) Atrial tachycardia ( SVT) - managed by cardio; per Dr. Wiley Koch, can take either Propranolol or Labetolol; she is on propranolol. Takes 40 mg in the morning; 20 mg in evening PRN      7) Negative per pt NIPT and AFP      8) H/O C/S x 1 - Op report LTCS. 40wga Pre-e SF  LTCS   Desires ; We discussed and will attempt      There is a 60-80% success rate of vaginal delivery after a previous  delivery, although it is not risk free.    The benefits of a successful vaginal delivery are: no surgical operation, usually fewer  complications compared to the complications of a repeat  delivery. In addition, the average hospitalization after a vaginal delivery is one or two days, compared with an average hospitalization after a  delivery of four or more days. The process of a vaginal delivery is also more helpful than  delivery in stimulating a baby's first breaths.    The risk of attempting vaginal delivery after a previous  delivery is that the scar on the uterus may separate during labor. This is known as uterine rupture and occurs in approximately 1 in 100 women with low transverse incision on the womb. Where the previous incision on the womb has been labeled unknown because medical records could not be obtained for review, then the risk of uterine rupture for women who have had two previous  deliveries is 2-3 per 100 women. Because of this risk, active labor will be observed in the hospital and special monitoring may be used during labor to help evaluate contractions and the infant's well being. If uterine rupture occurs, an urgent  delivery will be necessary. Uterine rupture can have negative effects for the infant or yourself. A blood transfusion due to excessive blood loss, in rare cases, the removal of the uterus (hysterectomy) may be necessary. One or two in 1000 women under going a trial of labor after a previous  delivery may deliver an infant with problems related to a slow heart rate just before delivery as a result of uterine rupture.     9) Obesity in Pregnancy   NST 34wga   Nst today reactive > 20 minutes      10)Reviewed this stage of pregnancy     11)Problem list updated       Pt is reasonable if no labor by 40wga consider RLTCS ( Declines sterilization; spouse vasectomy)     OB, NST weekly until delivery       I spent >30 minutes caring for Theresa on this date of service. This time includes time spent by me in the following activities: preparing for the visit,  reviewing tests, obtaining and/or reviewing a separately obtained history, performing a medically appropriate examination and/or evaluation, counseling and educating the patient/family/caregiver, ordering medications, tests, or procedures, documenting information in the medical record, independently interpreting results and communicating that information with the patient/family/caregiver and care coordination     Williams Ruiz DO     8/24/2023  13:33 EDT

## 2023-08-31 ENCOUNTER — ROUTINE PRENATAL (OUTPATIENT)
Dept: OBSTETRICS AND GYNECOLOGY | Facility: CLINIC | Age: 33
End: 2023-08-31
Payer: COMMERCIAL

## 2023-08-31 VITALS — SYSTOLIC BLOOD PRESSURE: 124 MMHG | BODY MASS INDEX: 53.09 KG/M2 | WEIGHT: 281 LBS | DIASTOLIC BLOOD PRESSURE: 80 MMHG

## 2023-08-31 DIAGNOSIS — Z36.9 ENCOUNTER FOR ANTENATAL SCREENING, UNSPECIFIED: Primary | ICD-10-CM

## 2023-09-01 NOTE — PROGRESS NOTES
Ob follow up      Theresa SAUL is a 33 y.o.  37+ patient being seen today for her obstetrical visit. Patient reports no complaints. Fetal movement: normal.  Asks for SVE. Had NST  today       ROS - Denies leaking fluid, vaginal bleeding and notes good fetal movement.     /80   Wt 127 kg (281 lb)   LMP 2022   BMI 53.09 kg/mý       Vitals: VSS; AF    General Appearance:  Awake. Alert. Well developed. Well nourished. In no acute distress.    Visual Inspection: ø Abdomen was normal on visual inspection.  Palpation: ø Abdomen was soft. ø Abdominal non-tender.    Uterus: ø Fundal height was normal for gestational age. ø Not tender.  Uterine Adnexae: ø Normal without masses or tenderness.  Neurological:  ø Oriented to time, place, and person.  Skin:  ø General appearance was normal. No bruising or ecchymosis.  Obstetrical: +FM and FCA      NST reactive > 20 minutes       A/P      1) 32yo  @ 37+ by LMP   GBS negative  NST reactive      2) Tdap received      3) ? CF/SMA/FX ( Pt stated all normal)      4) H/O LEEP- Had CL in second trimester     5) H/O Hashimoto's thyroiditis. Thyroid panel today      6) Atrial tachycardia ( SVT) - managed by cardio; per Dr. Wiley Koch, can take either Propranolol or Labetolol; she is on propranolol. Takes 40 mg in the morning; 20 mg in evening PRN      7) Negative per pt NIPT and AFP      8) H/O C/S x 1 - Op report LTCS. 40wga Pre-e SF  LTCS   Desires ; We discussed and will attempt      There is a 60-80% success rate of vaginal delivery after a previous  delivery, although it is not risk free.    The benefits of a successful vaginal delivery are: no surgical operation, usually fewer complications compared to the complications of a repeat  delivery. In addition, the average hospitalization after a vaginal delivery is one or two days, compared with an average hospitalization after a  delivery of four or more days. The process of  a vaginal delivery is also more helpful than  delivery in stimulating a baby's first breaths.    The risk of attempting vaginal delivery after a previous  delivery is that the scar on the uterus may separate during labor. This is known as uterine rupture and occurs in approximately 1 in 100 women with low transverse incision on the womb. Where the previous incision on the womb has been labeled unknown because medical records could not be obtained for review, then the risk of uterine rupture for women who have had two previous  deliveries is 2-3 per 100 women. Because of this risk, active labor will be observed in the hospital and special monitoring may be used during labor to help evaluate contractions and the infant's well being. If uterine rupture occurs, an urgent  delivery will be necessary. Uterine rupture can have negative effects for the infant or yourself. A blood transfusion due to excessive blood loss, in rare cases, the removal of the uterus (hysterectomy) may be necessary. One or two in 1000 women under going a trial of labor after a previous  delivery may deliver an infant with problems related to a slow heart rate just before delivery as a result of uterine rupture.     9) Obesity in Pregnancy   NST starting @ 34wga   Nst today reactive > 20 minutes      10)Reviewed this stage of pregnancy     11)Problem list updated       Pt is reasonable if no labor by 40wga consider RLTCS ( Declines sterilization; spouse vasectomy)     OB, NST weekly until delivery       I spent >30 minutes caring for Theresa on this date of service. This time includes time spent by me in the following activities: preparing for the visit, reviewing tests, obtaining and/or reviewing a separately obtained history, performing a medically appropriate examination and/or evaluation, counseling and educating the patient/family/caregiver, ordering medications, tests, or procedures, documenting  information in the medical record, independently interpreting results and communicating that information with the patient/family/caregiver and care coordination     Williams Ruiz DO     9/1/2023  07:32 EDT

## 2023-09-07 ENCOUNTER — ROUTINE PRENATAL (OUTPATIENT)
Dept: OBSTETRICS AND GYNECOLOGY | Facility: CLINIC | Age: 33
End: 2023-09-07
Payer: COMMERCIAL

## 2023-09-07 VITALS — WEIGHT: 286.2 LBS | DIASTOLIC BLOOD PRESSURE: 82 MMHG | BODY MASS INDEX: 54.08 KG/M2 | SYSTOLIC BLOOD PRESSURE: 128 MMHG

## 2023-09-07 DIAGNOSIS — E06.3 HYPERTHYROIDISM WITH HASHIMOTO DISEASE: ICD-10-CM

## 2023-09-07 DIAGNOSIS — Z87.59 HISTORY OF GESTATIONAL HYPERTENSION: ICD-10-CM

## 2023-09-07 DIAGNOSIS — E05.80 HYPERTHYROIDISM WITH HASHIMOTO DISEASE: ICD-10-CM

## 2023-09-07 DIAGNOSIS — O99.210 OBESITY IN PREGNANCY, ANTEPARTUM: ICD-10-CM

## 2023-09-07 DIAGNOSIS — Z36.9 ENCOUNTER FOR ANTENATAL SCREENING, UNSPECIFIED: Primary | ICD-10-CM

## 2023-09-07 DIAGNOSIS — I47.1 ATRIAL TACHYCARDIA: ICD-10-CM

## 2023-09-07 DIAGNOSIS — O34.211 MATERNAL CARE DUE TO LOW TRANSVERSE UTERINE SCAR FROM PREVIOUS CESAREAN DELIVERY: ICD-10-CM

## 2023-09-07 DIAGNOSIS — Z34.93 PRENATAL CARE IN THIRD TRIMESTER: ICD-10-CM

## 2023-09-07 DIAGNOSIS — N87.9 CERVICAL DYSPLASIA: ICD-10-CM

## 2023-09-07 NOTE — PROGRESS NOTES
Ob follow up      Theresa SAUL is a 33 y.o.  38+ patient being seen today for her obstetrical visit. Patient reports no complaints. Fetal movement: normal.  Asks for SVE. Had NST  today       ROS - Denies leaking fluid, vaginal bleeding and notes good fetal movement.     /82   Wt 130 kg (286 lb 3.2 oz)   LMP 2022   BMI 54.08 kg/m²       Vitals: VSS; AF    General Appearance:  Awake. Alert. Well developed. Well nourished. In no acute distress.    Visual Inspection: ° Abdomen was normal on visual inspection.  Palpation: ° Abdomen was soft. ° Abdominal non-tender.    Uterus: ° Fundal height was normal for gestational age. ° Not tender.  Uterine Adnexae: ° Normal without masses or tenderness.  Neurological:  ° Oriented to time, place, and person.  Skin:  ° General appearance was normal. No bruising or ecchymosis.  Obstetrical: +FM and FCA     SVE 1.5/30/-3     NST reactive > 20 minutes       A/P      1) 34yo  @ 38+ by LMP   GBS negative  NST reactive      2) Tdap received      3) ? CF/SMA/FX ( Pt stated all normal)      4) H/O LEEP- Had CL in second trimester     5) H/O Hashimoto's thyroiditis. Thyroid panel today      6) Atrial tachycardia ( SVT) - managed by cardio; per Dr. Wiley Koch, can take either Propranolol or Labetolol; she is on propranolol. Takes 40 mg in the morning; 20 mg in evening PRN      7) Negative per pt NIPT and AFP      8) H/O C/S x 1 - Op report LTCS. 40wga Pre-e SF  LTCS   Desires ; We discussed and will attempt      There is a 60-80% success rate of vaginal delivery after a previous  delivery, although it is not risk free.    The benefits of a successful vaginal delivery are: no surgical operation, usually fewer complications compared to the complications of a repeat  delivery. In addition, the average hospitalization after a vaginal delivery is one or two days, compared with an average hospitalization after a  delivery of four or  more days. The process of a vaginal delivery is also more helpful than  delivery in stimulating a baby's first breaths.    The risk of attempting vaginal delivery after a previous  delivery is that the scar on the uterus may separate during labor. This is known as uterine rupture and occurs in approximately 1 in 100 women with low transverse incision on the womb. Where the previous incision on the womb has been labeled unknown because medical records could not be obtained for review, then the risk of uterine rupture for women who have had two previous  deliveries is 2-3 per 100 women. Because of this risk, active labor will be observed in the hospital and special monitoring may be used during labor to help evaluate contractions and the infant's well being. If uterine rupture occurs, an urgent  delivery will be necessary. Uterine rupture can have negative effects for the infant or yourself. A blood transfusion due to excessive blood loss, in rare cases, the removal of the uterus (hysterectomy) may be necessary. One or two in 1000 women under going a trial of labor after a previous  delivery may deliver an infant with problems related to a slow heart rate just before delivery as a result of uterine rupture.     9) Obesity in Pregnancy   NST starting @ 34wga   Nst today reactive > 20 minutes      10)Reviewed this stage of pregnancy     11)Problem list updated       Pt is reasonable if no labor by 40wga consider RLTCS ( Declines sterilization; spouse vasectomy)     OB, NST weekly until delivery       I spent >30 minutes caring for Theresa on this date of service. This time includes time spent by me in the following activities: preparing for the visit, reviewing tests, obtaining and/or reviewing a separately obtained history, performing a medically appropriate examination and/or evaluation, counseling and educating the patient/family/caregiver, ordering medications, tests, or  procedures, documenting information in the medical record, independently interpreting results and communicating that information with the patient/family/caregiver and care coordination     Williams Ruiz DO     9/7/2023  14:23 EDT

## 2023-09-13 ENCOUNTER — ROUTINE PRENATAL (OUTPATIENT)
Dept: OBSTETRICS AND GYNECOLOGY | Facility: CLINIC | Age: 33
End: 2023-09-13
Payer: COMMERCIAL

## 2023-09-13 ENCOUNTER — CLINICAL SUPPORT (OUTPATIENT)
Dept: OBSTETRICS AND GYNECOLOGY | Facility: CLINIC | Age: 33
End: 2023-09-13
Payer: COMMERCIAL

## 2023-09-13 VITALS — SYSTOLIC BLOOD PRESSURE: 128 MMHG | DIASTOLIC BLOOD PRESSURE: 72 MMHG | BODY MASS INDEX: 54.42 KG/M2 | WEIGHT: 288 LBS

## 2023-09-13 DIAGNOSIS — Z36.9 ENCOUNTER FOR ANTENATAL SCREENING, UNSPECIFIED: ICD-10-CM

## 2023-09-13 DIAGNOSIS — O34.211 MATERNAL CARE DUE TO LOW TRANSVERSE UTERINE SCAR FROM PREVIOUS CESAREAN DELIVERY: ICD-10-CM

## 2023-09-13 DIAGNOSIS — E06.3 HYPERTHYROIDISM WITH HASHIMOTO DISEASE: ICD-10-CM

## 2023-09-13 DIAGNOSIS — O99.210 OBESITY IN PREGNANCY, ANTEPARTUM: ICD-10-CM

## 2023-09-13 DIAGNOSIS — Z34.93 PRENATAL CARE IN THIRD TRIMESTER: Primary | ICD-10-CM

## 2023-09-13 DIAGNOSIS — Z87.59 HISTORY OF GESTATIONAL HYPERTENSION: ICD-10-CM

## 2023-09-13 DIAGNOSIS — E05.80 HYPERTHYROIDISM WITH HASHIMOTO DISEASE: ICD-10-CM

## 2023-09-13 DIAGNOSIS — Z87.59 HISTORY OF GESTATIONAL HYPERTENSION: Primary | ICD-10-CM

## 2023-09-13 NOTE — PROGRESS NOTES
Ob follow up      Theresa SAUL is a 33 y.o.  39w1d patient being seen today for her obstetrical visit. Patient reports backache, no bleeding, no contractions, no leaking, and cramping . Fetal movement: normal.   Here with significant other.    ROS - Denies leaking fluid, vaginal bleeding and notes good fetal movement.     /72   Wt 131 kg (288 lb)   LMP 2022   BMI 54.42 kg/m²     FHT: 135 BPM   Uterine Size:      SVE 1.5/50/-2     1) 32yo  39w1d  GBS negative  NST reactive      2) Tdap received      3) ? CF/SMA/FX ( Pt stated all normal)      4) H/O LEEP- Had CL in second trimester     5) H/O Hashimoto's thyroiditis. Thyroid panel today      6) Atrial tachycardia ( SVT) - managed by cardio; per Dr. Wiley Koch, can take either Propranolol or Labetolol; she is on propranolol. Takes 40 mg in the morning; 20 mg in evening PRN      7) Negative per pt NIPT and AFP      8) H/O C/S x 1 2008- Op report LTCS. 40wga Pre-e SF  LTCS   Desires ; We discussed and will attempt      There is a 60-80% success rate of vaginal delivery after a previous  delivery, although it is not risk free.    The benefits of a successful vaginal delivery are: no surgical operation, usually fewer complications compared to the complications of a repeat  delivery. In addition, the average hospitalization after a vaginal delivery is one or two days, compared with an average hospitalization after a  delivery of four or more days. The process of a vaginal delivery is also more helpful than  delivery in stimulating a baby's first breaths.    The risk of attempting vaginal delivery after a previous  delivery is that the scar on the uterus may separate during labor. This is known as uterine rupture and occurs in approximately 1 in 100 women with low transverse incision on the womb. Where the previous incision on the womb has been labeled unknown because medical records could not be  obtained for review, then the risk of uterine rupture for women who have had two previous  deliveries is 2-3 per 100 women. Because of this risk, active labor will be observed in the hospital and special monitoring may be used during labor to help evaluate contractions and the infant's well being. If uterine rupture occurs, an urgent  delivery will be necessary. Uterine rupture can have negative effects for the infant or yourself. A blood transfusion due to excessive blood loss, in rare cases, the removal of the uterus (hysterectomy) may be necessary. One or two in 1000 women under going a trial of labor after a previous  delivery may deliver an infant with problems related to a slow heart rate just before delivery as a result of uterine rupture.     9) Obesity in Pregnancy- EFW 61% at 36wga  NST starting @ 34wga   Nst today reactive > 20 minutes      Reviewed this stage of pregnancy- reviewed signs of labor  Problem list updated   Follow-up in 5 days with MD      Pt is reasonable if no labor by 40wga consider RLTCS ( Declines sterilization; spouse vasectomy)     OB, NST weekly until delivery       Misti Wilder, APRN     2023  14:14 EDT

## 2023-09-18 ENCOUNTER — ROUTINE PRENATAL (OUTPATIENT)
Dept: OBSTETRICS AND GYNECOLOGY | Facility: CLINIC | Age: 33
End: 2023-09-18
Payer: COMMERCIAL

## 2023-09-18 ENCOUNTER — CLINICAL SUPPORT (OUTPATIENT)
Dept: OBSTETRICS AND GYNECOLOGY | Facility: CLINIC | Age: 33
End: 2023-09-18
Payer: COMMERCIAL

## 2023-09-18 VITALS — SYSTOLIC BLOOD PRESSURE: 134 MMHG | DIASTOLIC BLOOD PRESSURE: 84 MMHG | WEIGHT: 289 LBS | BODY MASS INDEX: 54.61 KG/M2

## 2023-09-18 DIAGNOSIS — O34.211 MATERNAL CARE DUE TO LOW TRANSVERSE UTERINE SCAR FROM PREVIOUS CESAREAN DELIVERY: ICD-10-CM

## 2023-09-18 DIAGNOSIS — Z34.90 PREGNANCY, UNSPECIFIED GESTATIONAL AGE: ICD-10-CM

## 2023-09-18 DIAGNOSIS — Z87.59 HISTORY OF GESTATIONAL HYPERTENSION: Primary | ICD-10-CM

## 2023-09-18 DIAGNOSIS — Z36.9 ENCOUNTER FOR ANTENATAL SCREENING, UNSPECIFIED: Primary | ICD-10-CM

## 2023-09-18 NOTE — PROGRESS NOTES
Ob follow up      Theresa SAUL is a 33 y.o.  39w6d patient being seen today for her obstetrical visit. Patient reports occasional contractions. Fetal movement: normal. She had a previous C/S and desires to . She discussed waiting to 41 weeks and given her gestational age, maternal BMI and fetal weight and I do not recommend waiting until 41 weeks. I also  spoke with Aiden Law and Joseph who agree that she should be offered IOL this week. I tried to have her come in tomorrow but Wednesday is the earliest she can come in due to childcare.       ROS - Denies leaking fluid, vaginal bleeding and notes good fetal movement.     /84   Wt 131 kg (289 lb)   LMP 2022   BMI 54.61 kg/m²     FHT:  140BPM    Uterine Size: N/A   Presentations: cephalic   Pelvic Exam:     Dilation: 1cm    Effacement: 50%    Station:  Floating                 Assessment    1) Pregnancy at 39w6d- NST reactive    2) Fetal status reassuring     3) GBS status: negative    4) Desires -  plan IOL this 2023, plan Cook catheter and pitocin.    There is a 60-80% success rate of vaginal delivery after a previous  delivery, although it is not risk free.     The benefits of a successful vaginal delivery are: no surgical operation, usually fewer complications compared to the complications of a repeat  delivery. In addition, the average hospitalization after a vaginal delivery is one or two days, compared with an average hospitalization after a  delivery of four or more days. The process of a vaginal delivery is also more helpful than  delivery in stimulating a baby's first breaths.     The risk of attempting vaginal delivery after a previous  delivery is that the scar on the uterus may separate during labor. This is known as uterine rupture and occurs in approximately 1 in 100 women with low transverse incision on the womb. Where the previous incision on the womb has been  labeled unknown because medical records could not be obtained for review, then the risk of uterine rupture for women who have had two previous  deliveries is 2-3 per 100 women. Because of this risk, active labor will be observed in the hospital and special monitoring may be used during labor to help evaluate contractions and the infant's well being. If uterine rupture occurs, an urgent  delivery will be necessary. Uterine rupture can have negative effects for the infant or yourself. A blood transfusion due to excessive blood loss, in rare cases, the removal of the uterus (hysterectomy) may be necessary. One or two in 1000 women under going a trial of labor after a previous  delivery may deliver an infant with problems related to a slow heart rate just before delivery as a result of uterine rupture.     5) H/O Hashimoto's- had normal TFT and elevated TPO 2023.     6) Atrial tachycardia ( SVT) - managed by cardio; per Dr. Wiley Koch, can take either Propranolol or Labetolol; she is on propranolol. Takes 40 mg in the morning; 20 mg in evening PRN     6) Maternal BMI 54    7) S/p LEEP 10/2021- nl pap/HPV 2022    Heather Ng MD     2023  11:18 EDT

## 2023-09-20 ENCOUNTER — ANESTHESIA EVENT (OUTPATIENT)
Dept: OBSTETRICS AND GYNECOLOGY | Facility: HOSPITAL | Age: 33
End: 2023-09-20
Payer: COMMERCIAL

## 2023-09-20 ENCOUNTER — ANESTHESIA (OUTPATIENT)
Dept: OBSTETRICS AND GYNECOLOGY | Facility: HOSPITAL | Age: 33
End: 2023-09-20
Payer: COMMERCIAL

## 2023-09-20 ENCOUNTER — HOSPITAL ENCOUNTER (INPATIENT)
Facility: HOSPITAL | Age: 33
LOS: 1 days | Discharge: HOME | End: 2023-09-21
Attending: STUDENT IN AN ORGANIZED HEALTH CARE EDUCATION/TRAINING PROGRAM | Admitting: STUDENT IN AN ORGANIZED HEALTH CARE EDUCATION/TRAINING PROGRAM
Payer: COMMERCIAL

## 2023-09-20 ENCOUNTER — HOSPITAL ENCOUNTER (OUTPATIENT)
Dept: LABOR AND DELIVERY | Facility: HOSPITAL | Age: 33
Discharge: HOME | End: 2023-09-20
Payer: COMMERCIAL

## 2023-09-20 DIAGNOSIS — O34.211 MATERNAL CARE DUE TO LOW TRANSVERSE UTERINE SCAR FROM PREVIOUS CESAREAN DELIVERY: Primary | ICD-10-CM

## 2023-09-20 PROBLEM — Z34.90 PREGNANT: Status: ACTIVE | Noted: 2023-09-20

## 2023-09-20 LAB
ABO GROUP BLD: NORMAL
ABO GROUP BLD: NORMAL
AMPHET+METHAMPHET UR QL: NEGATIVE
AMPHETAMINES UR QL: NEGATIVE
BARBITURATES UR QL SCN: NEGATIVE
BENZODIAZ UR QL SCN: NEGATIVE
BLD GP AB SCN SERPL QL: NEGATIVE
BUPRENORPHINE SERPL-MCNC: NEGATIVE NG/ML
CANNABINOIDS SERPL QL: NEGATIVE
COCAINE UR QL: NEGATIVE
DEPRECATED RDW RBC AUTO: 40.4 FL (ref 37–54)
ERYTHROCYTE [DISTWIDTH] IN BLOOD BY AUTOMATED COUNT: 13 % (ref 12.3–15.4)
HCT VFR BLD AUTO: 33.7 % (ref 34–46.6)
HGB BLD-MCNC: 10.9 G/DL (ref 12–15.9)
MCH RBC QN AUTO: 27.7 PG (ref 26.6–33)
MCHC RBC AUTO-ENTMCNC: 32.3 G/DL (ref 31.5–35.7)
MCV RBC AUTO: 85.8 FL (ref 79–97)
METHADONE UR QL SCN: NEGATIVE
OPIATES UR QL: NEGATIVE
OXYCODONE UR QL SCN: NEGATIVE
PCP UR QL SCN: NEGATIVE
PLATELET # BLD AUTO: 241 10*3/MM3 (ref 140–450)
PMV BLD AUTO: 10.4 FL (ref 6–12)
PROPOXYPH UR QL: NEGATIVE
RBC # BLD AUTO: 3.93 10*6/MM3 (ref 3.77–5.28)
RH BLD: POSITIVE
RH BLD: POSITIVE
T&S EXPIRATION DATE: NORMAL
TRICYCLICS UR QL SCN: NEGATIVE
WBC NRBC COR # BLD: 10.17 10*3/MM3 (ref 3.4–10.8)

## 2023-09-20 PROCEDURE — 85027 COMPLETE CBC AUTOMATED: CPT | Performed by: STUDENT IN AN ORGANIZED HEALTH CARE EDUCATION/TRAINING PROGRAM

## 2023-09-20 PROCEDURE — 25010000002 ONDANSETRON PER 1 MG: Performed by: NURSE ANESTHETIST, CERTIFIED REGISTERED

## 2023-09-20 PROCEDURE — 0 CEFAZOLIN SODIUM-DEXTROSE 1-4 GM-%(50ML) RECONSTITUTED SOLUTION

## 2023-09-20 PROCEDURE — 25010000002 AZITHROMYCIN PER 500 MG: Performed by: OBSTETRICS & GYNECOLOGY

## 2023-09-20 PROCEDURE — 86901 BLOOD TYPING SEROLOGIC RH(D): CPT

## 2023-09-20 PROCEDURE — 25010000002 DIPHENHYDRAMINE PER 50 MG: Performed by: NURSE ANESTHETIST, CERTIFIED REGISTERED

## 2023-09-20 PROCEDURE — 25010000002 MORPHINE PER 10 MG: Performed by: NURSE ANESTHETIST, CERTIFIED REGISTERED

## 2023-09-20 PROCEDURE — 25010000002 BUPIVACAINE (PF) 0.5 % SOLUTION: Performed by: NURSE ANESTHETIST, CERTIFIED REGISTERED

## 2023-09-20 PROCEDURE — 80306 DRUG TEST PRSMV INSTRMNT: CPT | Performed by: STUDENT IN AN ORGANIZED HEALTH CARE EDUCATION/TRAINING PROGRAM

## 2023-09-20 PROCEDURE — S0260 H&P FOR SURGERY: HCPCS | Performed by: STUDENT IN AN ORGANIZED HEALTH CARE EDUCATION/TRAINING PROGRAM

## 2023-09-20 PROCEDURE — 86901 BLOOD TYPING SEROLOGIC RH(D): CPT | Performed by: STUDENT IN AN ORGANIZED HEALTH CARE EDUCATION/TRAINING PROGRAM

## 2023-09-20 PROCEDURE — 86850 RBC ANTIBODY SCREEN: CPT | Performed by: STUDENT IN AN ORGANIZED HEALTH CARE EDUCATION/TRAINING PROGRAM

## 2023-09-20 PROCEDURE — C1755 CATHETER, INTRASPINAL: HCPCS | Performed by: ANESTHESIOLOGY

## 2023-09-20 PROCEDURE — 25010000002 PHENYLEPHRINE 10 MG/ML SOLUTION: Performed by: NURSE ANESTHETIST, CERTIFIED REGISTERED

## 2023-09-20 PROCEDURE — 86900 BLOOD TYPING SEROLOGIC ABO: CPT | Performed by: STUDENT IN AN ORGANIZED HEALTH CARE EDUCATION/TRAINING PROGRAM

## 2023-09-20 PROCEDURE — 86900 BLOOD TYPING SEROLOGIC ABO: CPT

## 2023-09-20 PROCEDURE — 0 CEFAZOLIN SODIUM-DEXTROSE 2-3 GM-%(50ML) RECONSTITUTED SOLUTION

## 2023-09-20 PROCEDURE — 25010000002 ONDANSETRON PER 1 MG: Performed by: OBSTETRICS & GYNECOLOGY

## 2023-09-20 PROCEDURE — 10907ZC DRAINAGE OF AMNIOTIC FLUID, THERAPEUTIC FROM PRODUCTS OF CONCEPTION, VIA NATURAL OR ARTIFICIAL OPENING: ICD-10-PCS | Performed by: STUDENT IN AN ORGANIZED HEALTH CARE EDUCATION/TRAINING PROGRAM

## 2023-09-20 PROCEDURE — 25010000002 PROPOFOL 200 MG/20ML EMULSION: Performed by: NURSE ANESTHETIST, CERTIFIED REGISTERED

## 2023-09-20 PROCEDURE — 59510 CESAREAN DELIVERY: CPT | Performed by: STUDENT IN AN ORGANIZED HEALTH CARE EDUCATION/TRAINING PROGRAM

## 2023-09-20 PROCEDURE — 59514 CESAREAN DELIVERY ONLY: CPT | Performed by: SPECIALIST/TECHNOLOGIST, OTHER

## 2023-09-20 RX ORDER — PROPRANOLOL HYDROCHLORIDE 10 MG/1
20 TABLET ORAL NIGHTLY
Status: DISCONTINUED | OUTPATIENT
Start: 2023-09-20 | End: 2023-09-21 | Stop reason: HOSPADM

## 2023-09-20 RX ORDER — KETOROLAC TROMETHAMINE 30 MG/ML
15 INJECTION, SOLUTION INTRAMUSCULAR; INTRAVENOUS EVERY 6 HOURS
Status: DISCONTINUED | OUTPATIENT
Start: 2023-09-20 | End: 2023-09-20

## 2023-09-20 RX ORDER — ENOXAPARIN SODIUM 100 MG/ML
40 INJECTION SUBCUTANEOUS EVERY 12 HOURS
Status: DISCONTINUED | OUTPATIENT
Start: 2023-09-21 | End: 2023-09-21 | Stop reason: HOSPADM

## 2023-09-20 RX ORDER — SODIUM CHLORIDE 9 MG/ML
40 INJECTION, SOLUTION INTRAVENOUS AS NEEDED
Status: DISCONTINUED | OUTPATIENT
Start: 2023-09-20 | End: 2023-09-21 | Stop reason: HOSPADM

## 2023-09-20 RX ORDER — PHENYLEPHRINE HYDROCHLORIDE 10 MG/ML
INJECTION INTRAVENOUS AS NEEDED
Status: DISCONTINUED | OUTPATIENT
Start: 2023-09-20 | End: 2023-09-20 | Stop reason: SURG

## 2023-09-20 RX ORDER — FENTANYL 0.2 MG/100ML-BUPIV 0.125%-NACL 0.9% EPIDURAL INJ 2/0.125 MCG/ML-%
SOLUTION INJECTION
Status: COMPLETED
Start: 2023-09-20 | End: 2023-09-20

## 2023-09-20 RX ORDER — FENTANYL 0.2 MG/100ML-BUPIV 0.125%-NACL 0.9% EPIDURAL INJ 2/0.125 MCG/ML-%
SOLUTION INJECTION CONTINUOUS
Status: DISCONTINUED | OUTPATIENT
Start: 2023-09-20 | End: 2023-09-21 | Stop reason: HOSPADM

## 2023-09-20 RX ORDER — SODIUM CHLORIDE 0.9 % (FLUSH) 0.9 %
10 SYRINGE (ML) INJECTION AS NEEDED
Status: DISCONTINUED | OUTPATIENT
Start: 2023-09-20 | End: 2023-09-21 | Stop reason: HOSPADM

## 2023-09-20 RX ORDER — IBUPROFEN 600 MG/1
600 TABLET ORAL EVERY 6 HOURS
Status: DISCONTINUED | OUTPATIENT
Start: 2023-09-21 | End: 2023-09-20

## 2023-09-20 RX ORDER — DOCUSATE SODIUM 100 MG/1
100 CAPSULE, LIQUID FILLED ORAL 2 TIMES DAILY
Status: DISCONTINUED | OUTPATIENT
Start: 2023-09-20 | End: 2023-09-21 | Stop reason: HOSPADM

## 2023-09-20 RX ORDER — SODIUM CHLORIDE, SODIUM LACTATE, POTASSIUM CHLORIDE, CALCIUM CHLORIDE 600; 310; 30; 20 MG/100ML; MG/100ML; MG/100ML; MG/100ML
125 INJECTION, SOLUTION INTRAVENOUS CONTINUOUS
Status: DISCONTINUED | OUTPATIENT
Start: 2023-09-20 | End: 2023-09-21 | Stop reason: HOSPADM

## 2023-09-20 RX ORDER — PROPOFOL 10 MG/ML
INJECTION, EMULSION INTRAVENOUS AS NEEDED
Status: DISCONTINUED | OUTPATIENT
Start: 2023-09-20 | End: 2023-09-20 | Stop reason: SURG

## 2023-09-20 RX ORDER — HYDROXYZINE HYDROCHLORIDE 10 MG/1
10 TABLET, FILM COATED ORAL ONCE
Status: COMPLETED | OUTPATIENT
Start: 2023-09-20 | End: 2023-09-20

## 2023-09-20 RX ORDER — ALUMINA, MAGNESIA, AND SIMETHICONE 2400; 2400; 240 MG/30ML; MG/30ML; MG/30ML
15 SUSPENSION ORAL EVERY 4 HOURS PRN
Status: DISCONTINUED | OUTPATIENT
Start: 2023-09-20 | End: 2023-09-21 | Stop reason: HOSPADM

## 2023-09-20 RX ORDER — OXYCODONE HYDROCHLORIDE 5 MG/1
10 TABLET ORAL EVERY 4 HOURS PRN
Status: DISCONTINUED | OUTPATIENT
Start: 2023-09-20 | End: 2023-09-21 | Stop reason: HOSPADM

## 2023-09-20 RX ORDER — OXYTOCIN/0.9 % SODIUM CHLORIDE 30/500 ML
2-20 PLASTIC BAG, INJECTION (ML) INTRAVENOUS
Status: DISCONTINUED | OUTPATIENT
Start: 2023-09-20 | End: 2023-09-21 | Stop reason: HOSPADM

## 2023-09-20 RX ORDER — LIDOCAINE HYDROCHLORIDE AND EPINEPHRINE 15; 5 MG/ML; UG/ML
INJECTION, SOLUTION EPIDURAL AS NEEDED
Status: DISCONTINUED | OUTPATIENT
Start: 2023-09-20 | End: 2023-09-20 | Stop reason: SURG

## 2023-09-20 RX ORDER — CALCIUM CARBONATE 500 MG/1
1 TABLET, CHEWABLE ORAL EVERY 4 HOURS PRN
Status: DISCONTINUED | OUTPATIENT
Start: 2023-09-20 | End: 2023-09-21 | Stop reason: HOSPADM

## 2023-09-20 RX ORDER — OXYCODONE HYDROCHLORIDE 5 MG/1
5 TABLET ORAL EVERY 4 HOURS PRN
Status: DISCONTINUED | OUTPATIENT
Start: 2023-09-20 | End: 2023-09-21 | Stop reason: HOSPADM

## 2023-09-20 RX ORDER — EPHEDRINE SULFATE 50 MG/ML
INJECTION, SOLUTION INTRAVENOUS AS NEEDED
Status: DISCONTINUED | OUTPATIENT
Start: 2023-09-20 | End: 2023-09-20 | Stop reason: SURG

## 2023-09-20 RX ORDER — PROPRANOLOL HYDROCHLORIDE 40 MG/1
40 TABLET ORAL EVERY MORNING
Status: DISCONTINUED | OUTPATIENT
Start: 2023-09-21 | End: 2023-09-21 | Stop reason: HOSPADM

## 2023-09-20 RX ORDER — OXYTOCIN/0.9 % SODIUM CHLORIDE 30/500 ML
125 PLASTIC BAG, INJECTION (ML) INTRAVENOUS CONTINUOUS PRN
Status: DISCONTINUED | OUTPATIENT
Start: 2023-09-20 | End: 2023-09-21 | Stop reason: HOSPADM

## 2023-09-20 RX ORDER — ACETAMINOPHEN 325 MG/1
650 TABLET ORAL EVERY 4 HOURS PRN
Status: DISCONTINUED | OUTPATIENT
Start: 2023-09-20 | End: 2023-09-21 | Stop reason: HOSPADM

## 2023-09-20 RX ORDER — DIPHENHYDRAMINE HYDROCHLORIDE 50 MG/ML
INJECTION INTRAMUSCULAR; INTRAVENOUS AS NEEDED
Status: DISCONTINUED | OUTPATIENT
Start: 2023-09-20 | End: 2023-09-20 | Stop reason: SURG

## 2023-09-20 RX ORDER — DIPHENHYDRAMINE HCL 25 MG
25 CAPSULE ORAL EVERY 4 HOURS PRN
Status: DISCONTINUED | OUTPATIENT
Start: 2023-09-20 | End: 2023-09-21 | Stop reason: HOSPADM

## 2023-09-20 RX ORDER — ACETAMINOPHEN 500 MG
1000 TABLET ORAL EVERY 6 HOURS
Status: COMPLETED | OUTPATIENT
Start: 2023-09-21 | End: 2023-09-21

## 2023-09-20 RX ORDER — CEFAZOLIN SODIUM 2 G/50ML
SOLUTION INTRAVENOUS
Status: COMPLETED
Start: 2023-09-20 | End: 2023-09-20

## 2023-09-20 RX ORDER — IBUPROFEN 600 MG/1
600 TABLET ORAL EVERY 6 HOURS
Status: DISCONTINUED | OUTPATIENT
Start: 2023-09-21 | End: 2023-09-21

## 2023-09-20 RX ORDER — SCOLOPAMINE TRANSDERMAL SYSTEM 1 MG/1
PATCH, EXTENDED RELEASE TRANSDERMAL AS NEEDED
Status: DISCONTINUED | OUTPATIENT
Start: 2023-09-20 | End: 2023-09-20 | Stop reason: SURG

## 2023-09-20 RX ORDER — SODIUM CHLORIDE 0.9 % (FLUSH) 0.9 %
10 SYRINGE (ML) INJECTION EVERY 12 HOURS SCHEDULED
Status: DISCONTINUED | OUTPATIENT
Start: 2023-09-20 | End: 2023-09-21 | Stop reason: HOSPADM

## 2023-09-20 RX ORDER — ONDANSETRON 2 MG/ML
INJECTION INTRAMUSCULAR; INTRAVENOUS AS NEEDED
Status: DISCONTINUED | OUTPATIENT
Start: 2023-09-20 | End: 2023-09-20 | Stop reason: SURG

## 2023-09-20 RX ORDER — ONDANSETRON 4 MG/1
4 TABLET, FILM COATED ORAL EVERY 8 HOURS PRN
Status: DISCONTINUED | OUTPATIENT
Start: 2023-09-20 | End: 2023-09-21 | Stop reason: HOSPADM

## 2023-09-20 RX ORDER — BUPIVACAINE HYDROCHLORIDE 5 MG/ML
INJECTION, SOLUTION EPIDURAL; INTRACAUDAL AS NEEDED
Status: DISCONTINUED | OUTPATIENT
Start: 2023-09-20 | End: 2023-09-20 | Stop reason: SURG

## 2023-09-20 RX ORDER — CEFAZOLIN SODIUM 1 G/50ML
SOLUTION INTRAVENOUS
Status: COMPLETED
Start: 2023-09-20 | End: 2023-09-20

## 2023-09-20 RX ORDER — PRENATAL VIT/IRON FUM/FOLIC AC 27MG-0.8MG
1 TABLET ORAL DAILY
Status: DISCONTINUED | OUTPATIENT
Start: 2023-09-21 | End: 2023-09-21 | Stop reason: HOSPADM

## 2023-09-20 RX ORDER — KETOROLAC TROMETHAMINE 30 MG/ML
15 INJECTION, SOLUTION INTRAMUSCULAR; INTRAVENOUS EVERY 6 HOURS
Status: COMPLETED | OUTPATIENT
Start: 2023-09-21 | End: 2023-09-21

## 2023-09-20 RX ORDER — ACETAMINOPHEN 500 MG
1000 TABLET ORAL EVERY 6 HOURS
Status: DISCONTINUED | OUTPATIENT
Start: 2023-09-20 | End: 2023-09-20

## 2023-09-20 RX ORDER — SIMETHICONE 80 MG
80 TABLET,CHEWABLE ORAL
Status: DISCONTINUED | OUTPATIENT
Start: 2023-09-20 | End: 2023-09-21 | Stop reason: HOSPADM

## 2023-09-20 RX ORDER — EPHEDRINE SULFATE 5 MG/ML
10 INJECTION INTRAVENOUS
Status: DISCONTINUED | OUTPATIENT
Start: 2023-09-20 | End: 2023-09-21 | Stop reason: HOSPADM

## 2023-09-20 RX ORDER — ACETAMINOPHEN 325 MG/1
650 TABLET ORAL EVERY 6 HOURS
Status: DISCONTINUED | OUTPATIENT
Start: 2023-09-21 | End: 2023-09-20

## 2023-09-20 RX ORDER — ONDANSETRON 2 MG/ML
4 INJECTION INTRAMUSCULAR; INTRAVENOUS ONCE
Status: COMPLETED | OUTPATIENT
Start: 2023-09-20 | End: 2023-09-20

## 2023-09-20 RX ORDER — LIDOCAINE HCL/EPINEPHRINE/PF 2%-1:200K
VIAL (ML) INJECTION AS NEEDED
Status: DISCONTINUED | OUTPATIENT
Start: 2023-09-20 | End: 2023-09-20 | Stop reason: SURG

## 2023-09-20 RX ORDER — MORPHINE SULFATE 1 MG/ML
INJECTION, SOLUTION EPIDURAL; INTRATHECAL; INTRAVENOUS AS NEEDED
Status: DISCONTINUED | OUTPATIENT
Start: 2023-09-20 | End: 2023-09-20 | Stop reason: SURG

## 2023-09-20 RX ORDER — LIDOCAINE HYDROCHLORIDE 10 MG/ML
0.5 INJECTION, SOLUTION INFILTRATION; PERINEURAL ONCE AS NEEDED
Status: DISCONTINUED | OUTPATIENT
Start: 2023-09-20 | End: 2023-09-21 | Stop reason: HOSPADM

## 2023-09-20 RX ORDER — MAGNESIUM CARB/ALUMINUM HYDROX 105-160MG
30 TABLET,CHEWABLE ORAL ONCE
Status: DISCONTINUED | OUTPATIENT
Start: 2023-09-20 | End: 2023-09-21 | Stop reason: HOSPADM

## 2023-09-20 RX ORDER — ACETAMINOPHEN 325 MG/1
650 TABLET ORAL EVERY 6 HOURS
Status: DISCONTINUED | OUTPATIENT
Start: 2023-09-21 | End: 2023-09-21 | Stop reason: HOSPADM

## 2023-09-20 RX ADMIN — PHENYLEPHRINE HYDROCHLORIDE 100 MCG: 10 INJECTION INTRAVENOUS at 20:49

## 2023-09-20 RX ADMIN — Medication 2 MILLI-UNITS/MIN: at 13:29

## 2023-09-20 RX ADMIN — PROPOFOL INJECTABLE EMULSION 20 MG: 10 INJECTION, EMULSION INTRAVENOUS at 21:15

## 2023-09-20 RX ADMIN — PROPRANOLOL HYDROCHLORIDE 20 MG: 10 TABLET ORAL at 22:27

## 2023-09-20 RX ADMIN — ONDANSETRON 4 MG: 2 INJECTION INTRAMUSCULAR; INTRAVENOUS at 19:57

## 2023-09-20 RX ADMIN — SCOPALAMINE 1 PATCH: 1 PATCH, EXTENDED RELEASE TRANSDERMAL at 19:57

## 2023-09-20 RX ADMIN — LIDOCAINE HYDROCHLORIDE AND EPINEPHRINE 2 ML: 15; 5 INJECTION, SOLUTION EPIDURAL at 11:45

## 2023-09-20 RX ADMIN — LIDOCAINE HYDROCHLORIDE,EPINEPHRINE BITARTRATE 5 MG: 20; .005 INJECTION, SOLUTION EPIDURAL; INFILTRATION; INTRACAUDAL; PERINEURAL at 20:07

## 2023-09-20 RX ADMIN — LIDOCAINE HYDROCHLORIDE,EPINEPHRINE BITARTRATE 5 MG: 20; .005 INJECTION, SOLUTION EPIDURAL; INFILTRATION; INTRACAUDAL; PERINEURAL at 20:17

## 2023-09-20 RX ADMIN — EPHEDRINE SULFATE 10 MG: 50 INJECTION, SOLUTION INTRAVENOUS at 20:59

## 2023-09-20 RX ADMIN — SODIUM CHLORIDE, POTASSIUM CHLORIDE, SODIUM LACTATE AND CALCIUM CHLORIDE 125 ML/HR: 600; 310; 30; 20 INJECTION, SOLUTION INTRAVENOUS at 17:14

## 2023-09-20 RX ADMIN — MORPHINE SULFATE 3 MG: 1 INJECTION, SOLUTION EPIDURAL; INTRATHECAL; INTRAVENOUS at 21:08

## 2023-09-20 RX ADMIN — PROPOFOL INJECTABLE EMULSION 10 MG: 10 INJECTION, EMULSION INTRAVENOUS at 21:20

## 2023-09-20 RX ADMIN — PHENYLEPHRINE HYDROCHLORIDE 100 MCG: 10 INJECTION INTRAVENOUS at 21:03

## 2023-09-20 RX ADMIN — DOCUSATE SODIUM 100 MG: 100 CAPSULE, LIQUID FILLED ORAL at 22:26

## 2023-09-20 RX ADMIN — Medication 10 ML/HR: at 11:50

## 2023-09-20 RX ADMIN — CEFAZOLIN SODIUM 1000 MG: 1 SOLUTION INTRAVENOUS at 20:10

## 2023-09-20 RX ADMIN — CEFAZOLIN SODIUM 2000 MG: 2 SOLUTION INTRAVENOUS at 20:01

## 2023-09-20 RX ADMIN — EPHEDRINE SULFATE 10 MG: 50 INJECTION, SOLUTION INTRAVENOUS at 21:03

## 2023-09-20 RX ADMIN — HYDROXYZINE HYDROCHLORIDE 10 MG: 10 TABLET, FILM COATED ORAL at 16:27

## 2023-09-20 RX ADMIN — LIDOCAINE HYDROCHLORIDE,EPINEPHRINE BITARTRATE 5 MG: 20; .005 INJECTION, SOLUTION EPIDURAL; INFILTRATION; INTRACAUDAL; PERINEURAL at 20:02

## 2023-09-20 RX ADMIN — SODIUM CHLORIDE, POTASSIUM CHLORIDE, SODIUM LACTATE AND CALCIUM CHLORIDE 1000 ML: 600; 310; 30; 20 INJECTION, SOLUTION INTRAVENOUS at 10:47

## 2023-09-20 RX ADMIN — EPHEDRINE SULFATE 10 MG: 50 INJECTION, SOLUTION INTRAVENOUS at 21:15

## 2023-09-20 RX ADMIN — SODIUM CHLORIDE, POTASSIUM CHLORIDE, SODIUM LACTATE AND CALCIUM CHLORIDE 125 ML/HR: 600; 310; 30; 20 INJECTION, SOLUTION INTRAVENOUS at 11:57

## 2023-09-20 RX ADMIN — ONDANSETRON 4 MG: 2 INJECTION INTRAMUSCULAR; INTRAVENOUS at 17:17

## 2023-09-20 RX ADMIN — DIPHENHYDRAMINE HYDROCHLORIDE 12.5 MG: 50 INJECTION, SOLUTION INTRAMUSCULAR; INTRAVENOUS at 19:57

## 2023-09-20 RX ADMIN — AZITHROMYCIN 500 MG: 500 INJECTION, POWDER, LYOPHILIZED, FOR SOLUTION INTRAVENOUS at 20:39

## 2023-09-20 RX ADMIN — LIDOCAINE HYDROCHLORIDE AND EPINEPHRINE 3 ML: 15; 5 INJECTION, SOLUTION EPIDURAL at 11:47

## 2023-09-20 RX ADMIN — SIMETHICONE 80 MG: 80 TABLET, CHEWABLE ORAL at 22:26

## 2023-09-20 RX ADMIN — ACETAMINOPHEN 1000 MG: 500 TABLET ORAL at 23:53

## 2023-09-20 RX ADMIN — LIDOCAINE HYDROCHLORIDE,EPINEPHRINE BITARTRATE 5 MG: 20; .005 INJECTION, SOLUTION EPIDURAL; INFILTRATION; INTRACAUDAL; PERINEURAL at 20:12

## 2023-09-20 RX ADMIN — BUPIVACAINE HYDROCHLORIDE 5 MG: 5 INJECTION, SOLUTION EPIDURAL; INTRACAUDAL; PERINEURAL at 20:21

## 2023-09-20 NOTE — PROCEDURES
COOK Catheter Placement     Indications: IOL    Post procedure diagnosis:  CLARE    Procedure Details     The risks and benefits of the procedure and Verbal informed consent obtained.    Speculum placed in vagina and excellent visualization of cervix achieved. Cook catheter placed in standard fashion; inflating both balloons with sterile water.     FWB noted while placing Cook catheter.     Tolerated well  No apparent complication     Findings: Unfavorable CVX    Complications: pain.      Plan:  1) Continue with IOL   2) Anticipate ; Operative delivery as indicated   3) Pain medications as needed.       Williams Ruiz,    2023  09:46 EDT

## 2023-09-20 NOTE — H&P
Patient Care Team:  Jennifer Levine APRN as PCP - General (Nurse Practitioner)  Williams Ruiz DO as Consulting Physician (Obstetrics and Gynecology)  Heather Ng MD as Consulting Physician (Obstetrics and Gynecology)    Chief complaint IOL       HPI: 32yo  @ 40+ presents for IOL> Does have ctx and back pain at times. Denies LOF or VB, + FM.     ROS:   Constitutional: Weight change and appetite change present.   Respiratory: Negative for cough and shortness of breath.    Cardiovascular: Negative for chest pain and palpitations.   Gastrointestinal: Neg    Endocrine: Negative.    Genitourinary: + missed menses, no dysuria   Skin: Negative.    Neurological: Negative for dizziness and headaches.   Hematological: Negative.    Psychiatric/Behavioral: Negative for dysphoric mood and sleep disturbance. The patient is not nervous/anxious.        PMH:   Past Medical History:   Diagnosis Date    Abnormal Pap smear of cervix     Allergic rhinitis     Anemia     My labs are always on the line of anemia    Anxiety     Carpal tunnel syndrome, right upper limb     Chronic migraine without aura, not intractable     Disease of thyroid gland     Hashimoto    GERD without esophagitis     HPV (human papilloma virus) infection     Insomnia     Ovarian cyst     Panic disorder without agoraphobia     PONV (postoperative nausea and vomiting)     Preeclampsia 10/30/2008    This occured at 40 weeks with first pregnanxy    Spinal headache          PSH:   Past Surgical History:   Procedure Laterality Date    ADENOIDECTOMY      CERVICAL BIOPSY  W/ LOOP ELECTRODE EXCISION       SECTION      CHOLECYSTECTOMY      LAPAROSCOPIC CHOLECYSTECTOMY      MIDDLE EAR SURGERY      MYRINGOTOMY W/ TUBES      TONSILLECTOMY         SoHx:   Social History     Socioeconomic History    Marital status:    Tobacco Use    Smoking status: Former     Packs/day: 0.00     Years: 5.00     Pack years: 0.00     Types:  Cigarettes     Quit date: 2018     Years since quittin.6    Smokeless tobacco: Never    Tobacco comments:     quit 3 months ago   Vaping Use    Vaping Use: Never used   Substance and Sexual Activity    Alcohol use: Not Currently     Comment: Socially drinks    Drug use: Never    Sexual activity: Yes     Partners: Male     Birth control/protection: Birth control pill     Comment: Pill       FHx:   Family History   Problem Relation Age of Onset    Hypertension Mother     Heart failure Father     Heart disease Father     Hypertension Father     Diabetes Paternal Aunt     Diabetes Paternal Uncle     Stroke Maternal Grandmother     Heart disease Paternal Grandfather     Diabetes Paternal Uncle        PGyn Hx: See office notes     POBHx:  PLTCS     Allergies: Prednisone and Hydromorphone    Medications:   No current facility-administered medications on file prior to encounter.     Current Outpatient Medications on File Prior to Encounter   Medication Sig Dispense Refill    fluticasone (FLONASE) 50 MCG/ACT nasal spray 2 sprays into the nostril(s) as directed by provider Daily.      hydrOXYzine (ATARAX) 10 MG tablet Take 1 tablet by mouth 3 (Three) Times a Day As Needed for Itching. 30 tablet 0    Magnesium Oxide -Mg Supplement 400 (240 Mg) MG tablet Take 1 tablet by mouth Daily. 30 tablet 1    ondansetron ODT (ZOFRAN-ODT) 4 MG disintegrating tablet Place 1 tablet on the tongue Every 8 (Eight) Hours As Needed for Nausea or Vomiting. 15 tablet 1    PRENATAL VIT-FE FUMARATE-FA PO Take  by mouth.      propranolol (INDERAL) 20 MG tablet Take 2 tablets by mouth.               Vital Signs  Temp:  [97.8 °F (36.6 °C)] 97.8 °F (36.6 °C)  Heart Rate:  [86] 86  Resp:  [18] 18  BP: (108)/(70) 108/70    Physical Exam:  Constitutional: She is oriented to person, place, and time. She appears well-developed and well-nourished.   HENT:   Head: Normocephalic and atraumatic.   Cardiovascular: Normal rate and regular rhythm.     Pulmonary/Chest: Effort normal. No respiratory distress.   Abdominal: Soft. Bowel sounds are normal. There is no tenderness. There is no rebound and no guarding.   Musculoskeletal: Normal range of motion.   Neurological: She is alert and oriented to person, place, and time.   Skin: Skin is warm and dry.   Psychiatric: She has a normal mood and affect. Her behavior is normal. Judgment and thought content normal.   Nursing note and vitals reviewed.  Debilities/Disabilities Identified: None  Emotional Behavior: Appropriate    Labs: Pending       Assessment/Plan:   34yo  at 40+1 presents for IOL.  ANC complicated by Hx of LTCS, Obesity, Hashimoto's , Atrial tahycardia.  Maternal VSS WNL.   FWB demonstrated by reactive cat I NST.  SVE /-3.  Cephalic by TAUS.  EFW by US on 24Aug 3060g or 6.12# .  Anticipate .    -Admit to L&D.  Consent signed and on chart.  -PIV, LR.  -T&S, CBC.  -Anesthesia consult.    -GBS Neg; prophylaxis not indicated.  -MBT A+; Rhophylac not indicated.  -Rubella Imm; MMR not indicated.  -Induce via Cytotec/FB and Pitocin/amniotomy as indicated.  -CD for maternal/fetal indications.      Consent for Vaginal Delivery  Pt. counseled as to risk of labor and delivery including but not limited to infection, bleeding (with possible need for blood transfusion, and its inherent risks of virus transmission, i.e. HIV, Hepatitis; possible transfusion reaction), possible emergent  section with its risks of damage to internal organs and necessary repairs, possible operative vaginal delivery, NON routine use of episiotomy and routine use of internal monitors and associated risks, anesthetic complications, injury to infant or mother at time of delivery, maternal or fetal death.  The pt. understands these risks and is willing to proceed.  All of her questions were answered.    Induction of Labor  Risks have been outlined to include (but not limited to) infection, uterine tetany with uterine  rupture and/or fetal distress, need for emergent  delivery and possible  hysterectomy, possible postpartum hemorrhage secondary to uterine atony which could be substantial enough to require blood transfusion with the inherent risks of hepatitis, AIDS, HIV infection as well as transfusion reaction. She understands the need for use of this induction method and desires to proceed. There is no evidence of cephalopelvic disproportion or fetal distress, or any contraindication to the induction technique selected.      TOLAC Counseling  There is a 60-80% success rate of vaginal delivery after a previous  delivery, although it is not risk free.     The benefits of a successful vaginal delivery are: no surgical operation, usually fewer complications compared to the complications of a repeat  delivery. In addition, the average hospitalization after a vaginal delivery is one or two days, compared with an average hospitalization after a  delivery of four or more days. The process of a vaginal delivery is also more helpful than  delivery in stimulating a baby's first breaths.     The risk of attempting vaginal delivery after a previous  delivery is that the scar on the uterus may separate during labor. This is known as uterine rupture and occurs in approximately 1 in 100 women with low transverse incision on the womb. Where the previous incision on the womb has been labeled unknown because medical records could not be obtained for review, then the risk of uterine rupture for women who have had two previous  deliveries is 2-3 per 100 women. Because of this risk, active labor will be observed in the hospital and special monitoring may be used during labor to help evaluate contractions and the infant's well being. If uterine rupture occurs, an urgent  delivery will be necessary. Uterine rupture can have negative effects for the infant or yourself. A blood  transfusion due to excessive blood loss, in rare cases, the removal of the uterus (hysterectomy) may be necessary. One or two in 1000 women under going a trial of labor after a previous  delivery may deliver an infant with problems related to a slow heart rate just before delivery as a result of uterine rupture.     I discussed the patients findings and my recommendations with patient, family, and nursing staff.         Williams Ruiz DO  23  05:52 EDT

## 2023-09-20 NOTE — PLAN OF CARE
Goal Outcome Evaluation:  Plan of Care Reviewed With: patient, spouse        Progress: no change  Outcome Evaluation: vss, induction started with cook catheter placement, patient tolerated well, desires to be able to get up and move around during early labor

## 2023-09-20 NOTE — PROGRESS NOTES
Epidural in place   2-3/80/-2 AROM with cervical exam; thick meconium   FWB with moderate variability   Continue with IOL; operative delivery as indicated

## 2023-09-20 NOTE — PROGRESS NOTES
Called to see patient.  Recurrent late decelerations on Pitocin.  Pitocin stopped.  Long-term variability remains but has persistent recurrent late decelerations consistent with placental insufficiency.  Also has thick meconium as noted at checkout.  No cervical changes staff had just checked the patient recently.  Discussed with the patient and her significant other concern for placental insufficiency and no cervical change.  Will need Pitocin.  Of course still has risk of uterine rupture.  Concerned that pushing the fetus and delivering a compromise fetus may in fact be even more damaging to transition to extra fetal life.  Plan to have  present which the patient agrees with.  Discussing primary  for placental insufficiency, recurrent late decelerations and unable to adequately produce and be used to affect labor.  Patient's questions answered.  Parents considering.    Edinson Law MD

## 2023-09-20 NOTE — PROGRESS NOTES
Into speak with parents.  Questions answered.  They would desire to move forward with repeat low-transverse  section given the fact that we have to keep turning off the pit or we have recurrent late decelerations.  Fetal status is reassuring at this time but concerning given thick meconium.  Neonatologist has been notified and is in route.  Second partner to come in to help with difficult surgery due to increased BMI.  Anesthesia was notified.  Patient and spouse agreeable with plan.  Everyone comfortable with moving forward at this time.    Edinson Law MD

## 2023-09-20 NOTE — ANESTHESIA PREPROCEDURE EVALUATION
Anesthesia Evaluation     Patient summary reviewed and Nursing notes reviewed   history of anesthetic complications (spinal headache):  PONV  NPO Solid Status: N/A  NPO Liquid Status: N/A           Airway   Mallampati: I  TM distance: >3 FB  Neck ROM: full  No difficulty expected  Dental - normal exam     Pulmonary - negative pulmonary ROS and normal exam   Cardiovascular - normal exam    Rhythm: regular  Rate: normal    (+) dysrhythmias (SVT on propranolol) Tachycardia      Neuro/Psych  (+) headaches (migraines)  GI/Hepatic/Renal/Endo    (+) morbid obesity, thyroid problem (hashimotos)     Musculoskeletal (-) negative ROS    Abdominal   (+) obese   Substance History - negative use     OB/GYN    (+) Pregnant    Comment: No HTN this pregnancy      Other - negative ROS                     Anesthesia Plan    ASA 3     epidural     intravenous induction     Anesthetic plan, risks, benefits, and alternatives have been provided, discussed and informed consent has been obtained with: patient and spouse/significant other.    Use of blood products discussed with patient and spouse/significant other  Consented to blood products.      CODE STATUS:    Level Of Support Discussed With: Patient  Code Status (Patient has no pulse and is not breathing): CPR (Attempt to Resuscitate)  Medical Interventions (Patient has pulse or is breathing): Full Support

## 2023-09-20 NOTE — ANESTHESIA PROCEDURE NOTES
Epidural Block    Pre-sedation assessment completed: 9/20/2023 11:25 AM    Start Time: 9/20/2023 11:31 AM  Stop Time: 9/20/2023 11:45 AM  Performed By  Anesthesiologist: Lelia Pickett MDSRNA: Cj Greer SRNA  Preanesthetic Checklist  Completed: patient identified, IV checked, site marked, risks and benefits discussed, surgical consent, monitors and equipment checked, pre-op evaluation and timeout performed  Additional Notes  1st attempt unsuccessful by Cj FAM, 2nd attempt successful by Dr. Pickett  Prep:  Pt Position:sitting  Sterile Tech:cap, gloves, mask and sterile barrier  Prep:chlorhexidine gluconate and isopropyl alcohol  Monitoring:blood pressure monitoring, continuous pulse oximetry and EKG  Epidural Block Procedure:  Approach:midline  Guidance:landmark technique and palpation technique  Level:3-4  Needle Type:Tuohy  Needle Gauge:18 G  Cath Size: 20 G  Loss of Resistance Medium: saline  Loss of Resistance: 7cm  Cath Depth at skin:13 cm  Paresthesia: none  Aspiration:negative  Test Dose:negative  Post Assessment:  Dressing:biopatch applied, occlusive dressing applied and secured with tape  Pt Tolerance:patient tolerated the procedure well with no apparent complications  Complications:no

## 2023-09-21 VITALS
RESPIRATION RATE: 16 BRPM | TEMPERATURE: 98.2 F | SYSTOLIC BLOOD PRESSURE: 94 MMHG | DIASTOLIC BLOOD PRESSURE: 55 MMHG | HEART RATE: 72 BPM | OXYGEN SATURATION: 98 %

## 2023-09-21 LAB
BASOPHILS # BLD AUTO: 0.02 10*3/MM3 (ref 0–0.2)
BASOPHILS NFR BLD AUTO: 0.2 % (ref 0–1.5)
DEPRECATED RDW RBC AUTO: 40.5 FL (ref 37–54)
EOSINOPHIL # BLD AUTO: 0.03 10*3/MM3 (ref 0–0.4)
EOSINOPHIL NFR BLD AUTO: 0.3 % (ref 0.3–6.2)
ERYTHROCYTE [DISTWIDTH] IN BLOOD BY AUTOMATED COUNT: 13 % (ref 12.3–15.4)
HCT VFR BLD AUTO: 30.9 % (ref 34–46.6)
HGB BLD-MCNC: 9.9 G/DL (ref 12–15.9)
IMM GRANULOCYTES # BLD AUTO: 0.06 10*3/MM3 (ref 0–0.05)
IMM GRANULOCYTES NFR BLD AUTO: 0.5 % (ref 0–0.5)
LYMPHOCYTES # BLD AUTO: 1.56 10*3/MM3 (ref 0.7–3.1)
LYMPHOCYTES NFR BLD AUTO: 13.4 % (ref 19.6–45.3)
MCH RBC QN AUTO: 27.5 PG (ref 26.6–33)
MCHC RBC AUTO-ENTMCNC: 32 G/DL (ref 31.5–35.7)
MCV RBC AUTO: 85.8 FL (ref 79–97)
MONOCYTES # BLD AUTO: 0.86 10*3/MM3 (ref 0.1–0.9)
MONOCYTES NFR BLD AUTO: 7.4 % (ref 5–12)
NEUTROPHILS NFR BLD AUTO: 78.2 % (ref 42.7–76)
NEUTROPHILS NFR BLD AUTO: 9.15 10*3/MM3 (ref 1.7–7)
NRBC BLD AUTO-RTO: 0 /100 WBC (ref 0–0.2)
PLATELET # BLD AUTO: 218 10*3/MM3 (ref 140–450)
PMV BLD AUTO: 10.4 FL (ref 6–12)
RBC # BLD AUTO: 3.6 10*6/MM3 (ref 3.77–5.28)
WBC NRBC COR # BLD: 11.68 10*3/MM3 (ref 3.4–10.8)

## 2023-09-21 PROCEDURE — 0503F POSTPARTUM CARE VISIT: CPT | Performed by: STUDENT IN AN ORGANIZED HEALTH CARE EDUCATION/TRAINING PROGRAM

## 2023-09-21 PROCEDURE — 88307 TISSUE EXAM BY PATHOLOGIST: CPT

## 2023-09-21 PROCEDURE — 63710000001 DIPHENHYDRAMINE PER 50 MG: Performed by: STUDENT IN AN ORGANIZED HEALTH CARE EDUCATION/TRAINING PROGRAM

## 2023-09-21 PROCEDURE — 0503F POSTPARTUM CARE VISIT: CPT | Performed by: OBSTETRICS & GYNECOLOGY

## 2023-09-21 PROCEDURE — 25010000002 ENOXAPARIN PER 10 MG: Performed by: STUDENT IN AN ORGANIZED HEALTH CARE EDUCATION/TRAINING PROGRAM

## 2023-09-21 PROCEDURE — 25010000002 KETOROLAC TROMETHAMINE PER 15 MG: Performed by: OBSTETRICS & GYNECOLOGY

## 2023-09-21 PROCEDURE — 85025 COMPLETE CBC W/AUTO DIFF WBC: CPT | Performed by: STUDENT IN AN ORGANIZED HEALTH CARE EDUCATION/TRAINING PROGRAM

## 2023-09-21 RX ORDER — CARBOPROST TROMETHAMINE 250 UG/ML
250 INJECTION, SOLUTION INTRAMUSCULAR
Status: DISCONTINUED | OUTPATIENT
Start: 2023-09-21 | End: 2023-09-21 | Stop reason: HOSPADM

## 2023-09-21 RX ORDER — OXYTOCIN/0.9 % SODIUM CHLORIDE 30/500 ML
250 PLASTIC BAG, INJECTION (ML) INTRAVENOUS CONTINUOUS
Status: ACTIVE | OUTPATIENT
Start: 2023-09-21 | End: 2023-09-21

## 2023-09-21 RX ORDER — IBUPROFEN 600 MG/1
600 TABLET ORAL EVERY 6 HOURS
Status: DISCONTINUED | OUTPATIENT
Start: 2023-09-21 | End: 2023-09-21 | Stop reason: HOSPADM

## 2023-09-21 RX ORDER — METHYLERGONOVINE MALEATE 0.2 MG/ML
200 INJECTION INTRAVENOUS ONCE AS NEEDED
Status: DISCONTINUED | OUTPATIENT
Start: 2023-09-21 | End: 2023-09-21 | Stop reason: HOSPADM

## 2023-09-21 RX ORDER — OXYCODONE HYDROCHLORIDE AND ACETAMINOPHEN 5; 325 MG/1; MG/1
1 TABLET ORAL EVERY 6 HOURS PRN
Qty: 10 TABLET | Refills: 0 | Status: SHIPPED | OUTPATIENT
Start: 2023-09-21 | End: 2023-09-25

## 2023-09-21 RX ORDER — MISOPROSTOL 200 UG/1
800 TABLET ORAL ONCE AS NEEDED
Status: DISCONTINUED | OUTPATIENT
Start: 2023-09-21 | End: 2023-09-21 | Stop reason: HOSPADM

## 2023-09-21 RX ORDER — OXYTOCIN/0.9 % SODIUM CHLORIDE 30/500 ML
999 PLASTIC BAG, INJECTION (ML) INTRAVENOUS ONCE
Status: DISCONTINUED | OUTPATIENT
Start: 2023-09-21 | End: 2023-09-21 | Stop reason: HOSPADM

## 2023-09-21 RX ORDER — KETOROLAC TROMETHAMINE 30 MG/ML
30 INJECTION, SOLUTION INTRAMUSCULAR; INTRAVENOUS ONCE
Status: DISCONTINUED | OUTPATIENT
Start: 2023-09-21 | End: 2023-09-21 | Stop reason: SDUPTHER

## 2023-09-21 RX ORDER — IBUPROFEN 800 MG/1
800 TABLET ORAL EVERY 8 HOURS PRN
Qty: 30 TABLET | Refills: 0 | Status: SHIPPED | OUTPATIENT
Start: 2023-09-21

## 2023-09-21 RX ADMIN — ENOXAPARIN SODIUM 40 MG: 40 INJECTION SUBCUTANEOUS at 10:22

## 2023-09-21 RX ADMIN — PRENATAL VIT W/ FE FUMARATE-FA TAB 27-0.8 MG 1 TABLET: 27-0.8 TAB at 08:00

## 2023-09-21 RX ADMIN — ACETAMINOPHEN 1000 MG: 500 TABLET ORAL at 06:12

## 2023-09-21 RX ADMIN — KETOROLAC TROMETHAMINE 15 MG: 30 INJECTION, SOLUTION INTRAMUSCULAR; INTRAVENOUS at 10:22

## 2023-09-21 RX ADMIN — IBUPROFEN 600 MG: 600 TABLET, FILM COATED ORAL at 18:56

## 2023-09-21 RX ADMIN — SIMETHICONE 80 MG: 80 TABLET, CHEWABLE ORAL at 12:13

## 2023-09-21 RX ADMIN — OXYCODONE HYDROCHLORIDE 5 MG: 5 TABLET ORAL at 18:56

## 2023-09-21 RX ADMIN — DOCUSATE SODIUM 100 MG: 100 CAPSULE, LIQUID FILLED ORAL at 08:00

## 2023-09-21 RX ADMIN — SODIUM CHLORIDE, POTASSIUM CHLORIDE, SODIUM LACTATE AND CALCIUM CHLORIDE 999 ML/HR: 600; 310; 30; 20 INJECTION, SOLUTION INTRAVENOUS at 10:43

## 2023-09-21 RX ADMIN — Medication 10 ML: at 09:00

## 2023-09-21 RX ADMIN — SODIUM CHLORIDE, POTASSIUM CHLORIDE, SODIUM LACTATE AND CALCIUM CHLORIDE 125 ML/HR: 600; 310; 30; 20 INJECTION, SOLUTION INTRAVENOUS at 00:05

## 2023-09-21 RX ADMIN — KETOROLAC TROMETHAMINE 15 MG: 30 INJECTION, SOLUTION INTRAMUSCULAR; INTRAVENOUS at 03:04

## 2023-09-21 RX ADMIN — SIMETHICONE 80 MG: 80 TABLET, CHEWABLE ORAL at 18:56

## 2023-09-21 RX ADMIN — ACETAMINOPHEN 1000 MG: 500 TABLET ORAL at 12:13

## 2023-09-21 RX ADMIN — DIPHENHYDRAMINE HYDROCHLORIDE 25 MG: 25 CAPSULE ORAL at 00:25

## 2023-09-21 RX ADMIN — OXYCODONE HYDROCHLORIDE 5 MG: 5 TABLET ORAL at 14:07

## 2023-09-21 RX ADMIN — SIMETHICONE 80 MG: 80 TABLET, CHEWABLE ORAL at 08:00

## 2023-09-21 NOTE — PLAN OF CARE
Problem: Adult Inpatient Plan of Care  Goal: Plan of Care Review  Outcome: Ongoing, Progressing  Flowsheets (Taken 9/21/2023 0629)  Progress: improving  Plan of Care Reviewed With: patient  Outcome Evaluation:   VSS. F/C in place   urine output adequate but concentrated.LR infusing at 125cc/hr. Meds given as scheduled   pain well controlled. Pt up   standing at bedside.Fundus firm, midline at U. Lochia scant rubra. Wound vac dsg applied.  Goal: Patient-Specific Goal (Individualized)  Outcome: Ongoing, Progressing  Flowsheets (Taken 9/21/2023 0629)  Patient-Specific Goals (Include Timeframe): pt would like pass to see baby at [facility]  Individualized Care Needs: keep informed of POC  Anxieties, Fears or Concerns: baby in NICU at [facility]  Goal: Absence of Hospital-Acquired Illness or Injury  Outcome: Ongoing, Progressing  Intervention: Identify and Manage Fall Risk  Recent Flowsheet Documentation  Taken 9/21/2023 0220 by Nikki Carmichael RN  Safety Promotion/Fall Prevention: safety round/check completed  Taken 9/20/2023 2140 by Nikki Carmichael RN  Safety Promotion/Fall Prevention: safety round/check completed  Intervention: Prevent and Manage VTE (Venous Thromboembolism) Risk  Recent Flowsheet Documentation  Taken 9/21/2023 0220 by Nikki Carmichael RN  Activity Management: standing at bedside  VTE Prevention/Management: (per pt request)   bilateral   sequential compression devices off  Taken 9/20/2023 2215 by Nikki Carmichael RN  Activity Management: bedrest  Taken 9/20/2023 2200 by Nikki Carmichael RN  Activity Management: bedrest  Taken 9/20/2023 2140 by Nikki Carmichael RN  Activity Management: bedrest  VTE Prevention/Management:   bilateral   sequential compression devices on  Goal: Optimal Comfort and Wellbeing  Outcome: Ongoing, Progressing  Intervention: Monitor Pain and Promote Comfort  Recent Flowsheet Documentation  Taken 9/21/2023 0612 by Nikki Carmichael RN  Pain Management Interventions:   pillow  support provided   see MAR  Intervention: Provide Person-Centered Care  Recent Flowsheet Documentation  Taken 2023 0041 by Nikki Carmichael RN  Trust Relationship/Rapport:   care explained   choices provided   emotional support provided   empathic listening provided   questions answered   questions encouraged   reassurance provided   thoughts/feelings acknowledged  Taken 2023 2330 by Nikki Carmichael RN  Trust Relationship/Rapport:   care explained   choices provided   emotional support provided   empathic listening provided   questions answered   questions encouraged   reassurance provided   thoughts/feelings acknowledged  Taken 2023 2140 by Nikki Carmichael RN  Trust Relationship/Rapport:   care explained   choices provided   emotional support provided   empathic listening provided   questions answered   questions encouraged   reassurance provided   thoughts/feelings acknowledged  Goal: Readiness for Transition of Care  Outcome: Ongoing, Progressing     Problem: Bleeding (Labor)  Goal: Hemostasis  Outcome: Ongoing, Progressing     Problem: Change in Fetal Wellbeing (Labor)  Goal: Stable Fetal Wellbeing  Outcome: Ongoing, Progressing     Problem: Delayed Labor Progression (Labor)  Goal: Effective Progression to Delivery  Outcome: Ongoing, Progressing     Problem: Infection (Labor)  Goal: Absence of Infection Signs and Symptoms  Outcome: Ongoing, Progressing     Problem: Labor Pain (Labor)  Goal: Acceptable Pain Control  Outcome: Ongoing, Progressing     Problem: Uterine Tachysystole (Labor)  Goal: Normal Uterine Contraction Pattern  Outcome: Ongoing, Progressing     Problem: Adjustment to Role Transition (Postpartum  Delivery)  Goal: Successful Maternal Role Transition  Outcome: Ongoing, Progressing     Problem: Bleeding (Postpartum  Delivery)  Goal: Hemostasis  Outcome: Ongoing, Progressing     Problem: Infection (Postpartum  Delivery)  Goal: Absence of Infection Signs  and Symptoms  Outcome: Ongoing, Progressing     Problem: Pain (Postpartum  Delivery)  Goal: Acceptable Pain Control  Outcome: Ongoing, Progressing  Intervention: Prevent or Manage Pain  Recent Flowsheet Documentation  Taken 202312 by Nikki Carmichael RN  Pain Management Interventions:   pillow support provided   see MAR     Problem: Postoperative Nausea and Vomiting (Postpartum  Delivery)  Goal: Nausea and Vomiting Relief  Outcome: Ongoing, Progressing     Problem: Postoperative Urinary Retention (Postpartum  Delivery)  Goal: Effective Urinary Elimination  Outcome: Ongoing, Progressing   Goal Outcome Evaluation:  Plan of Care Reviewed With: patient        Progress: improving  Outcome Evaluation: VSS. F/C in place; urine output adequate but concentrated.LR infusing at 125cc/hr. Meds given as scheduled; pain well controlled. Pt up; standing at bedside.Fundus firm, midline at U. Lochia scant rubra. Wound vac dsg applied.

## 2023-09-21 NOTE — DISCHARGE SUMMARY
Obstetrical Discharge Form    Primary OB Clinician: [facility]      Preadmission Diagnosis:  1. Theresa SAUL is a 33 y.o.  with IUP @ 40w1d     Discharge Diagnosis:  Same, plus:   RLTCS  Obesity,   Hashimoto's ,   Atrial tahycardia.       Antepartum complications: non-reassuring fetal testing    Date of Delivery:  2023     Delivered By:  Williams Ruiz DO     Delivery Type: repeat  section, low transverse incision    Tubal Ligation: n/a    Baby: Liveborn male    Anesthesia: epidural    Intrapartum complications: Non-reassuring Fetal Status    Laceration: n/a    Feeding method: breast    Hospital Course: Theresa SAUL is a 33 y.o.   who presented with an IUP 40w1d. Has a RLTCS for [facility]. Infant was shipped to [place] on POD#0. Meeting milestones POD#1.      Discharge Date: 2023; Discharge Time: 13:58 EDT    Review of Systems  BP 94/55 (BP Location: Left arm, Patient Position: Lying)   Pulse 72   Temp 98.2 °F (36.8 °C) (Oral)   Resp 16   LMP 2022   SpO2 98%   Breastfeeding Unknown      Physical Exam    VSS WNL since delivery.  Exam unremarkable with firm fundus.  Appropriate Hct drop.  No S/S PPD.    Please see progress note for more PE details     Results from last 7 days   Lab Units 23  0348   HEMOGLOBIN g/dL 9.9*     Infant: male  Feeding:breast  Rh status: positive, Rhogam was not indicated  Rubella: Immune  Diabetes: no  Contraception: vasectomy        Plan:  D/c today if infant cleared by peds.   Patient given written instruction sheet.  Follow-up appointment with Joseph on Monday to remove wound vacuum and to assess pt.     Discharge time was less than 30 minutes.     Williams Ruiz DO  13:58 EDT  2023

## 2023-09-21 NOTE — PAYOR COMM NOTE
"Theresa ZUNIGA (33 y.o. Female)     [id number]          Date of Birth   1990    Social Security Number       Address   [address] [facility] [address]    Home Phone   [phone]    MRN   [id number]       Jainism   Cheondoism    Marital Status                               Admission Date   9/20/23    Admission Type   Elective    Admitting Provider   Williams Ruiz DO    Attending Provider   Williams Ruiz DO    Department, Room/Bed   [facility] OB GYN, 1122/1       Discharge Date       Discharge Disposition   Home or Self Care    Discharge Destination                                 Attending Provider: Williams Ruiz DO    Allergies: Prednisone, Hydromorphone    Isolation: None   Infection: None   Code Status: CPR    Ht: 154.9 cm (61\")   Wt: 131 kg (289 lb)    Admission Cmt: None   Principal Problem: Pregnant [Z34.90]                   Active Insurance as of 9/20/2023       Primary Coverage       Payor Plan Insurance Group Employer/Plan Group    ANTHEM BLUE CROSS ANTHEM BLUE CROSS BLUE SHIELD PPO [id number]       Payor Plan Address Payor Plan Phone Number Payor Plan Fax Number Effective Dates    [address] [phone]  1/1/2023 - None Entered    [facility] [id number]         Subscriber Name Subscriber Birth Date Member ID       PATRICIA ZUNIGA S 5[birth year]/1984 [id number]                     Emergency Contacts        (Rel.) Home Phone Work Phone Mobile Phone    Patricia Zuniga \"Stevenson\" (Spouse) -- -- [phone]    Anika Warren (Mother) [phone] -- [phone]              Insurance Information                  ANTHEM BLUE CROSS/ANTHEM BLUE CROSS BLUE SHIELD PPO Phone: [phone]    Subscriber: Patricia Zuniga Subscriber#: [id number]    Group#: [id number] Precert#: --             History & Physical        Williams Ruiz DO at 09/20/23 0552             Patient Care Team:  Jennifer Levine, APRN as PCP - General (Nurse " Practitioner)  Williams Ruiz DO as Consulting Physician (Obstetrics and Gynecology)  Heatehr Ng MD as Consulting Physician (Obstetrics and Gynecology)    Chief complaint IOL       HPI: 34yo  @ 40+ presents for IOL> Does have ctx and back pain at times. Denies LOF or VB, + FM.     ROS:   Constitutional: Weight change and appetite change present.   Respiratory: Negative for cough and shortness of breath.    Cardiovascular: Negative for chest pain and palpitations.   Gastrointestinal: Neg    Endocrine: Negative.    Genitourinary: + missed menses, no dysuria   Skin: Negative.    Neurological: Negative for dizziness and headaches.   Hematological: Negative.    Psychiatric/Behavioral: Negative for dysphoric mood and sleep disturbance. The patient is not nervous/anxious.        PMH:   Past Medical History:   Diagnosis Date    Abnormal Pap smear of cervix     Allergic rhinitis     Anemia     My labs are always on the line of anemia    Anxiety     Carpal tunnel syndrome, right upper limb     Chronic migraine without aura, not intractable     Disease of thyroid gland     Hashimoto    GERD without esophagitis     HPV (human papilloma virus) infection     Insomnia     Ovarian cyst     Panic disorder without agoraphobia     PONV (postoperative nausea and vomiting)     Preeclampsia 10/30/2008    This occured at 40 weeks with first pregnanxy    Spinal headache          PSH:   Past Surgical History:   Procedure Laterality Date    ADENOIDECTOMY      CERVICAL BIOPSY  W/ LOOP ELECTRODE EXCISION       SECTION      CHOLECYSTECTOMY      LAPAROSCOPIC CHOLECYSTECTOMY  2014    MIDDLE EAR SURGERY      MYRINGOTOMY W/ TUBES      TONSILLECTOMY         SoHx:   Social History     Socioeconomic History    Marital status:    Tobacco Use    Smoking status: Former     Packs/day: 0.00     Years: 5.00     Pack years: 0.00     Types: Cigarettes     Quit date: 2018     Years since quittin.6    Smokeless  tobacco: Never    Tobacco comments:     quit 3 months ago   Vaping Use    Vaping Use: Never used   Substance and Sexual Activity    Alcohol use: Not Currently     Comment: Socially drinks    Drug use: Never    Sexual activity: Yes     Partners: Male     Birth control/protection: Birth control pill     Comment: Pill       FHx:   Family History   Problem Relation Age of Onset    Hypertension Mother     Heart failure Father     Heart disease Father     Hypertension Father     Diabetes Paternal Aunt     Diabetes Paternal Uncle     Stroke Maternal Grandmother     Heart disease Paternal Grandfather     Diabetes Paternal Uncle        PGyn Hx: See office notes     POBHx:  PLTCS     Allergies: Prednisone and Hydromorphone    Medications:   No current facility-administered medications on file prior to encounter.     Current Outpatient Medications on File Prior to Encounter   Medication Sig Dispense Refill    fluticasone (FLONASE) 50 MCG/ACT nasal spray 2 sprays into the nostril(s) as directed by provider Daily.      hydrOXYzine (ATARAX) 10 MG tablet Take 1 tablet by mouth 3 (Three) Times a Day As Needed for Itching. 30 tablet 0    Magnesium Oxide -Mg Supplement 400 (240 Mg) MG tablet Take 1 tablet by mouth Daily. 30 tablet 1    ondansetron ODT (ZOFRAN-ODT) 4 MG disintegrating tablet Place 1 tablet on the tongue Every 8 (Eight) Hours As Needed for Nausea or Vomiting. 15 tablet 1    PRENATAL VIT-FE FUMARATE-FA PO Take  by mouth.      propranolol (INDERAL) 20 MG tablet Take 2 tablets by mouth.               Vital Signs  Temp:  [97.8 °F (36.6 °C)] 97.8 °F (36.6 °C)  Heart Rate:  [86] 86  Resp:  [18] 18  BP: (108)/(70) 108/70    Physical Exam:  Constitutional: She is oriented to person, place, and time. She appears well-developed and well-nourished.   HENT:   Head: Normocephalic and atraumatic.   Cardiovascular: Normal rate and regular rhythm.    Pulmonary/Chest: Effort normal. No respiratory distress.   Abdominal: Soft. Bowel  sounds are normal. There is no tenderness. There is no rebound and no guarding.   Musculoskeletal: Normal range of motion.   Neurological: She is alert and oriented to person, place, and time.   Skin: Skin is warm and dry.   Psychiatric: She has a normal mood and affect. Her behavior is normal. Judgment and thought content normal.   Nursing note and vitals reviewed.  Debilities/Disabilities Identified: None  Emotional Behavior: Appropriate    Labs: Pending       Assessment/Plan:   34yo  at 40+1 presents for IOL.  ANC complicated by Hx of LTCS, Obesity, Hashimoto's , Atrial tahycardia.  Maternal VSS WNL.   FWB demonstrated by reactive cat I NST.  SVE /-3.  Cephalic by TAUS.  EFW by US on 24Aug 3060g or 6.12# .  Anticipate .    -Admit to L&D.  Consent signed and on chart.  -PIV, LR.  -T&S, CBC.  -Anesthesia consult.    -GBS Neg; prophylaxis not indicated.  -MBT A+; Rhophylac not indicated.  -Rubella Imm; MMR not indicated.  -Induce via Cytotec/FB and Pitocin/amniotomy as indicated.  -CD for maternal/fetal indications.      Consent for Vaginal Delivery  Pt. counseled as to risk of labor and delivery including but not limited to infection, bleeding (with possible need for blood transfusion, and its inherent risks of virus transmission, i.e. HIV, Hepatitis; possible transfusion reaction), possible emergent  section with its risks of damage to internal organs and necessary repairs, possible operative vaginal delivery, NON routine use of episiotomy and routine use of internal monitors and associated risks, anesthetic complications, injury to infant or mother at time of delivery, maternal or fetal death.  The pt. understands these risks and is willing to proceed.  All of her questions were answered.    Induction of Labor  Risks have been outlined to include (but not limited to) infection, uterine tetany with uterine rupture and/or fetal distress, need for emergent  delivery and possible   hysterectomy, possible postpartum hemorrhage secondary to uterine atony which could be substantial enough to require blood transfusion with the inherent risks of hepatitis, AIDS, HIV infection as well as transfusion reaction. She understands the need for use of this induction method and desires to proceed. There is no evidence of cephalopelvic disproportion or fetal distress, or any contraindication to the induction technique selected.      TOLAC Counseling  There is a 60-80% success rate of vaginal delivery after a previous  delivery, although it is not risk free.     The benefits of a successful vaginal delivery are: no surgical operation, usually fewer complications compared to the complications of a repeat  delivery. In addition, the average hospitalization after a vaginal delivery is one or two days, compared with an average hospitalization after a  delivery of four or more days. The process of a vaginal delivery is also more helpful than  delivery in stimulating a baby's first breaths.     The risk of attempting vaginal delivery after a previous  delivery is that the scar on the uterus may separate during labor. This is known as uterine rupture and occurs in approximately 1 in 100 women with low transverse incision on the womb. Where the previous incision on the womb has been labeled unknown because medical records could not be obtained for review, then the risk of uterine rupture for women who have had two previous  deliveries is 2-3 per 100 women. Because of this risk, active labor will be observed in the hospital and special monitoring may be used during labor to help evaluate contractions and the infant's well being. If uterine rupture occurs, an urgent  delivery will be necessary. Uterine rupture can have negative effects for the infant or yourself. A blood transfusion due to excessive blood loss, in rare cases, the removal of the uterus  (hysterectomy) may be necessary. One or two in 1000 women under going a trial of labor after a previous  delivery may deliver an infant with problems related to a slow heart rate just before delivery as a result of uterine rupture.     I discussed the patients findings and my recommendations with patient, family, and nursing staff.         Williams Ruiz DO  23  05:52 EDT           Electronically signed by Williams Ruiz DO at 23 0556       Facility-Administered Medications as of 2023   Medication Dose Route Frequency Provider Last Rate Last Admin    [COMPLETED] acetaminophen (TYLENOL) tablet 1,000 mg  1,000 mg Oral Q6H Eidnson Law MD   1,000 mg at 23 1213    Followed by    acetaminophen (TYLENOL) tablet 650 mg  650 mg Oral Q6H Edinson Law MD        acetaminophen (TYLENOL) tablet 650 mg  650 mg Oral Q4H PRN Williams Ruiz DO        aluminum-magnesium hydroxide-simethicone (MAALOX MAX) 400-400-40 MG/5ML suspension 15 mL  15 mL Oral Q4H PRN Williams Ruiz DO        Or    calcium carbonate (TUMS) chewable tablet 500 mg (200 mg elemental)  1 tablet Oral Q4H PRN Williams Ruiz DO        [COMPLETED] azithromycin (ZITHROMAX) 500 mg in sodium chloride 0.9 % 250 mL IVPB-VTB  500 mg Intravenous Once Edinson Law MD   500 mg at 23    carboprost (HEMABATE) injection 250 mcg  250 mcg Intramuscular Q15 Min PRN Williams Ruiz DO        ceFAZolin 3000 mg IVPB in 100 mL NS (VTB)  3 g Intravenous Once Edinson Law MD        [COMPLETED] ceFAZolin Sodium-Dextrose (ANCEF) 1-4 GM-%(50ML) IVPB (duplex)  - ADS Override Pull        1,000 mg at 23    [COMPLETED] ceFAZolin Sodium-Dextrose (ANCEF) 2-3 GM-%(50ML) IVPB (duplex)  - ADS Override Pull        2,000 mg at 23    diphenhydrAMINE (BENADRYL) capsule 25 mg  25 mg Oral Q4H PRN Williams Ruiz DO   25 mg at 23 0025    docusate sodium  (COLACE) capsule 100 mg  100 mg Oral BID Williams Ruiz DO   100 mg at 09/21/23 0800    Enoxaparin Sodium (LOVENOX) syringe 40 mg  40 mg Subcutaneous Q12H Williams Ruiz DO   40 mg at 09/21/23 1022    ePHEDrine Sulfate (Pressors) 5 MG/ML injection 10 mg  10 mg Intravenous Q10 Min PRN Lelia Pickett MD        [COMPLETED] fentaNYL (2 mcg/mL) and bupivacaine (0.125%) in 100 mL NS 0.2-0.125-0.9 MG/100ML-% infusion solution  - ADS Override Pull             [COMPLETED] fentanyl 2 mcg/ml and bupivacaine 0.125% epidural bolus from bag  10 mL Epidural Once Lelia Pickett MD   5 mL at 09/20/23 1150    Followed by    fentaNYL (2 mcg/mL) and bupivacaine (0.125%) in 100 mL NS epidural   Epidural Continuous Lelia Pickett MD   Stopped at 09/20/23 1952    [COMPLETED] hydrOXYzine (ATARAX) tablet 10 mg  10 mg Oral Once Williams Ruiz DO   10 mg at 09/20/23 1627    [COMPLETED] ketorolac (TORADOL) injection 15 mg  15 mg Intravenous Q6H Williams Ruiz DO   15 mg at 09/21/23 1022    Followed by    ibuprofen (ADVIL,MOTRIN) tablet 600 mg  600 mg Oral Q6H Williams Ruiz DO        [COMPLETED] lactated ringers bolus 1,000 mL  1,000 mL Intravenous Once PRN Williams Ruiz DO 1,000 mL/hr at 09/20/23 1047 1,000 mL at 09/20/23 1047    lactated ringers bolus 1,000 mL  1,000 mL Intravenous Once Lelia Pickett MD        lactated ringers infusion  125 mL/hr Intravenous Continuous Williams Ruiz DO   Stopped at 09/21/23 1221    lanolin topical 1 application   1 application  Topical Q1H PRN Williams Ruiz DO        lidocaine (XYLOCAINE) 1 % injection 0.5 mL  0.5 mL Intradermal Once PRN Williams Ruiz DO        methylergonovine (METHERGINE) injection 200 mcg  200 mcg Intramuscular Once PRN Williams Ruiz DO        mineral oil liquid 30 mL  30 mL Topical Once Williams Ruiz DO        miSOPROStol (CYTOTEC) tablet 800 mcg  800 mcg Rectal Once PRN Williams Ruiz DO        [COMPLETED] ondansetron (ZOFRAN) injection 4  mg  4 mg Intravenous Once Edinson Law MD   4 mg at 23 1717    ondansetron (ZOFRAN) tablet 4 mg  4 mg Oral Q8H PRN Williams Ruiz DO        oxyCODONE (ROXICODONE) immediate release tablet 5 mg  5 mg Oral Q4H PRN Williams Ruiz DO   5 mg at 23 1407    Or    oxyCODONE (ROXICODONE) immediate release tablet 10 mg  10 mg Oral Q4H PRN Williams Ruiz DO        oxytocin (PITOCIN) 30 units in 0.9% sodium chloride 500 mL (premix)  999 mL/hr Intravenous Once Williams Ruiz DO        Followed by    [] oxytocin (PITOCIN) 30 units in 0.9% sodium chloride 500 mL (premix)  250 mL/hr Intravenous Continuous Williams Ruiz DO        oxytocin (PITOCIN) 30 units in 0.9% sodium chloride 500 mL (premix)  2-20 denae-units/min Intravenous Titrated Williams Ruiz  mL/hr at 23 400 denae-units/min at 23    oxytocin (PITOCIN) 30 units in 0.9% sodium chloride 500 mL (premix)  125 mL/hr Intravenous Continuous PRN Williams Ruiz DO        prenatal vitamin tablet 1 tablet  1 tablet Oral Daily Williams Ruiz DO   1 tablet at 23 0800    propranolol (INDERAL) tablet 20 mg  20 mg Oral Nightly Williams Ruiz DO   20 mg at 23 2227    propranolol (INDERAL) tablet 40 mg  40 mg Oral QAM Williams Ruiz DO        simethicone (MYLICON) chewable tablet 80 mg  80 mg Oral 4x Daily AC & at Bedtime Williams Ruiz DO   80 mg at 23 1213    sodium chloride 0.9 % flush 10 mL  10 mL Intravenous Q12H Williams Ruiz DO   10 mL at 23 0900    sodium chloride 0.9 % flush 10 mL  10 mL Intravenous PRN Williams Ruiz DO        sodium chloride 0.9 % infusion 40 mL  40 mL Intravenous PRN Williams Ruiz DO         Lab Results (last 72 hours)       Procedure Component Value Units Date/Time    CBC & Differential [[id number]]  (Abnormal) Collected: 23 0348    Specimen: Blood from Arm, Left Updated: 23 1659    Narrative:      The  following orders were created for panel order CBC & Differential.  Procedure                               Abnormality         Status                     ---------                               -----------         ------                     CBC Auto Differential[[id number]]        Abnormal            Final result                 Please view results for these tests on the individual orders.    CBC Auto Differential [[id number]]  (Abnormal) Collected: 09/21/23 0348    Specimen: Blood from Arm, Left Updated: 09/21/23 0354     WBC 11.68 10*3/mm3      RBC 3.60 10*6/mm3      Hemoglobin 9.9 g/dL      Hematocrit 30.9 %      MCV 85.8 fL      MCH 27.5 pg      MCHC 32.0 g/dL      RDW 13.0 %      RDW-SD 40.5 fl      MPV 10.4 fL      Platelets 218 10*3/mm3      Neutrophil % 78.2 %      Lymphocyte % 13.4 %      Monocyte % 7.4 %      Eosinophil % 0.3 %      Basophil % 0.2 %      Immature Grans % 0.5 %      Neutrophils, Absolute 9.15 10*3/mm3      Lymphocytes, Absolute 1.56 10*3/mm3      Monocytes, Absolute 0.86 10*3/mm3      Eosinophils, Absolute 0.03 10*3/mm3      Basophils, Absolute 0.02 10*3/mm3      Immature Grans, Absolute 0.06 10*3/mm3      nRBC 0.0 /100 WBC     Tissue Pathology Exam [[id number]] Collected: 09/21/23 0013    Specimen: Tissue from Placenta Updated: 09/21/23 0029    Urine Drug Screen - Urine, Clean Catch [[id number]]  (Normal) Collected: 09/20/23 0614    Specimen: Urine, Clean Catch Updated: 09/20/23 0636     THC, Screen, Urine Negative     Phencyclidine (PCP), Urine Negative     Cocaine Screen, Urine Negative     Methamphetamine, Ur Negative     Opiate Screen Negative     Amphetamine Screen, Urine Negative     Benzodiazepine Screen, Urine Negative     Tricyclic Antidepressants Screen Negative     Methadone Screen, Urine Negative     Barbiturates Screen, Urine Negative     Oxycodone Screen, Urine Negative     Propoxyphene Screen Negative     Buprenorphine, Screen, Urine Negative    Narrative:      Urine drug screen  results are to be used for medical purposes only.  They are not to be used for legal purposes such as employment testing.  Negative results do not necessarily mean the complete absence of a subtance, but rather that the result is less than the cutoff for that substance.  Positive results are unconfirmed and considered Preliminary Positive.  [facility] does not automatically confirm Postitive Unconfirmed results.  The physician may request (order) an Unconfirmed Positive result to be sent out for confirmation.      Negative Thresholds for Drugs Screened:    THC screen, urine                          50 ng/ml  Phenycyclidine (PCP), urine                25 ng/ml  Cocaine screen, urine                     150 ng/ml  Methamphetamine, urine                    500 ng/ml  Opiate screen, urine                      100 ng/ml  Amphetamine screen, urine                 500 ng/ml  Benzodiazepine screen, urine              150 ng/ml  Tricyclic Antidepressants screen, urine   300 ng/ml  Methadone screen, urine                   200 ng/ml  Barbiturates screen, urine                200 ng/ml  Oxycodone screen, urine                   100 ng/ml  Propoxyphene screen, urine                300 ng/ml  Buprenorphine screen, urine                10 ng/ml    CBC (No Diff) [[id number]]  (Abnormal) Collected: 09/20/23 0613    Specimen: Blood Updated: 09/20/23 0625     WBC 10.17 10*3/mm3      RBC 3.93 10*6/mm3      Hemoglobin 10.9 g/dL      Hematocrit 33.7 %      MCV 85.8 fL      MCH 27.7 pg      MCHC 32.3 g/dL      RDW 13.0 %      RDW-SD 40.4 fl      MPV 10.4 fL      Platelets 241 10*3/mm3           Imaging Results (Last 72 Hours)       ** No results found for the last 72 hours. **          ECG/EMG Results (last 72 hours)       ** No results found for the last 72 hours. **          Orders (last 72 hrs)        Start     Ordered    09/22/23 0600  Anesthesia Follow-Up  Once        Provider:  (Not yet assigned)    09/21/23 1240     09/21/23 2215  acetaminophen (TYLENOL) tablet 650 mg  Every 6 Hours,   Status:  Discontinued        See Hyperspace for full Linked Orders Report.    09/20/23 2116    09/21/23 2215  ibuprofen (ADVIL,MOTRIN) tablet 600 mg  Every 6 Hours,   Status:  Discontinued        See Hyperspace for full Linked Orders Report.    09/20/23 2116 09/21/23 1800  acetaminophen (TYLENOL) tablet 650 mg  Every 6 Hours        See Hyperspace for full Linked Orders Report.    09/20/23 2221    09/21/23 1630  ibuprofen (ADVIL,MOTRIN) tablet 600 mg  Every 6 Hours        See Hyperspace for full Linked Orders Report.    09/21/23 1401    09/21/23 1500  ibuprofen (ADVIL,MOTRIN) tablet 600 mg  Every 6 Hours,   Status:  Discontinued        See Hyperspace for full Linked Orders Report.    09/20/23 2221 09/21/23 1403  Discharge patient  Once         09/21/23 1403    09/21/23 1345  oxytocin (PITOCIN) 30 units in 0.9% sodium chloride 500 mL (premix)  Continuous        See Hyperspace for full Linked Orders Report.    09/21/23 1240    09/21/23 1340  Per Dr Ruiz, pt may have 6-hour pass to visit infant in [facility].  Nursing Communication  Once        Comments: Per Dr Ruiz, pt may have 6-hour pass to visit infant in [facility].    09/21/23 1348    09/21/23 1330  oxytocin (PITOCIN) 30 units in 0.9% sodium chloride 500 mL (premix)  Once        See Hyperspace for full Linked Orders Report.    09/21/23 1240    09/21/23 1330  ketorolac (TORADOL) injection 30 mg  Once,   Status:  Discontinued         09/21/23 1240    09/21/23 1300  Respirations  Every Hour      Comments: If respiratory rate is less than 10/min, notify the Anesthesiologist    09/21/23 1240    09/21/23 1241  Notify Provider (Specified)  Until Discontinued         09/21/23 1240    09/21/23 1241  Vital Signs Per Hospital Policy  Per Hospital Policy         09/21/23 1240    09/21/23 1241  Fundal & Lochia Check  Per Order Details        Comments: Every 15 Minutes x4, Then Every 30 Minutes x2, Then Every  Shift    09/21/23 1240    09/21/23 1241  Diet: Regular/House Diet; Texture: Regular Texture (IDDSI 7); Fluid Consistency: Thin (IDDSI 0)  Diet Effective Now         09/21/23 1240    09/21/23 1241  Anesthesia Follow-Up  Once        Provider:  (Not yet assigned)    09/21/23 1240    09/21/23 1241  Oxygen Therapy- Nasal Cannula; 2 LPM  Continuous         09/21/23 1240    09/21/23 1241  If respiratory is less than 8/min, see Narcan order below. Notify Anesthesiologist STAT.  Until Discontinued         09/21/23 1240    09/21/23 1241  Blood Pressure and Pulse Every 4 Hours  Continuous         09/21/23 1240    09/21/23 1241  Activity & Removal of Patel Catheter, Per Obstetrician  Continuous         09/21/23 1240    09/21/23 1241  Notify Anesthesiologist for Any Questions / Problems  Continuous         09/21/23 1240    09/21/23 1241  Notify Anesthesia for Temp Over 101.4F  Continuous         09/21/23 1240    09/21/23 1241  Ambu Bag at Bedside  Continuous         09/21/23 1240    09/21/23 1241  Place Sequential Compression Device  Once         09/21/23 1240    09/21/23 1241  Maintain Sequential Compression Device  Continuous         09/21/23 1240    09/21/23 1240  Fundal & Lochia Check  Every Shift       09/21/23 1240    09/21/23 1240  methylergonovine (METHERGINE) injection 200 mcg  Once As Needed         09/21/23 1240    09/21/23 1240  carboprost (HEMABATE) injection 250 mcg  Every 15 Minutes PRN         09/21/23 1240    09/21/23 1240  miSOPROStol (CYTOTEC) tablet 800 mcg  Once As Needed         09/21/23 1240    09/21/23 1240  Urinary Catheter Care  Every Shift       09/21/23 1240    09/21/23 1000  Enoxaparin Sodium (LOVENOX) syringe 40 mg  Every 12 Hours         09/20/23 2116 09/21/23 0900  prenatal vitamin tablet 1 tablet  Daily         09/20/23 2116 09/21/23 0800  Ambulate Patient  2 Times Daily      Comments: After anesthesia wears off.    09/20/23 2116 09/21/23 0700  propranolol (INDERAL) tablet 40 mg  Every  Morning         09/20/23 2137 09/21/23 0600  Discontinue Indwelling Urinary Catheter in AM  Once,   Status:  Canceled         09/20/23 2116 09/21/23 0600  Abdominal Wound Care  Per Order Details        Comments: Postop Day 1. Remove Dressing & Leave Incision Open to Air.    09/20/23 2116 09/21/23 0600  CBC & Differential  Morning Draw         09/20/23 2116 09/21/23 0600  CBC Auto Differential  PROCEDURE ONCE         09/20/23 2205    09/21/23 0300  ketorolac (TORADOL) injection 15 mg  Every 6 Hours        See Hyperspace for full Linked Orders Report.    09/20/23 2221 09/21/23 0009  Tissue Pathology Exam  Once         09/21/23 0009    09/21/23 0000  Remove Abdominal Dressing  Once         09/20/23 2116 09/21/23 0000  acetaminophen (TYLENOL) tablet 1,000 mg  Every 6 Hours        See Hyperspace for full Linked Orders Report.    09/20/23 2221 09/21/23 0000  ibuprofen (ADVIL,MOTRIN) 800 MG tablet  Every 8 Hours PRN         09/21/23 1403    09/21/23 0000  oxyCODONE-acetaminophen (Percocet) 5-325 MG per tablet  Every 6 Hours PRN         09/21/23 1403 09/20/23 2230  propranolol (INDERAL) tablet 20 mg  Nightly         09/20/23 2137 09/20/23 2215  acetaminophen (TYLENOL) tablet 1,000 mg  Every 6 Hours,   Status:  Discontinued        See Hyperspace for full Linked Orders Report.    09/20/23 2116 09/20/23 2215  ketorolac (TORADOL) injection 15 mg  Every 6 Hours,   Status:  Discontinued        See Hyperspace for full Linked Orders Report.    09/20/23 2116 09/20/23 2215  simethicone (MYLICON) chewable tablet 80 mg  4 Times Daily Before Meals & Nightly         09/20/23 2116 09/20/23 2215  docusate sodium (COLACE) capsule 100 mg  2 Times Daily         09/20/23 2116 09/20/23 2200  Incentive spirometry RT  Every Hour       09/20/23 2116 09/20/23 2116  aluminum-magnesium hydroxide-simethicone (MAALOX MAX) 400-400-40 MG/5ML suspension 15 mL  Every 4 Hours PRN        See Hyperspace for full  "Linked Orders Report.    09/20/23 2116 09/20/23 2116  calcium carbonate (TUMS) chewable tablet 500 mg (200 mg elemental)  Every 4 Hours PRN        See Hyperspace for full Linked Orders Report.    09/20/23 2116 09/20/23 2116  lanolin topical 1 application   Every 1 Hour PRN         09/20/23 2116 09/20/23 2116  ondansetron (ZOFRAN) tablet 4 mg  Every 8 Hours PRN         09/20/23 2116 09/20/23 2116  Code Status and Medical Interventions:  Continuous         09/20/23 2116 09/20/23 2116  Vital Signs Per Hospital Policy  Per Hospital Policy         09/20/23 2116 09/20/23 2116  Notify Physician  Until Discontinued         09/20/23 2116 09/20/23 2116  Patient May Shower  Per Order Details        Comments: After Anesthesia Wears Off & With Assistance    09/20/23 2116 09/20/23 2116  Advance Diet As Tolerated -Regular  Until Discontinued,   Status:  Canceled         09/20/23 2116 09/20/23 2116  I/O  Every Shift       09/20/23 2116 09/20/23 2116  Fundal and Lochia Check  Per Order Details        Comments: Every 30 Minutes x2, Every 1 Hour x4, Every 4 Hours x24 Hours, Then Every Shift.    09/20/23 2116 09/20/23 2116  Weigh Patient  Once         09/20/23 2116 09/20/23 2116  Continue Indwelling Urinary Catheter Already in Place  Once         09/20/23 2116 09/20/23 2116  Notify Provider if Bladder Distention Continues  Until Discontinued         09/20/23 2116 09/20/23 2116  Turn Cough Deep Breathe  Once         09/20/23 2116 09/20/23 2116  Encourage early intake of PO fluids  Continuous         09/20/23 2116 09/20/23 2116  Ambulate Patient 3-5 times per day (with or without Patel)  Every Shift       09/20/23 2116 09/20/23 2116  Apply Abdominal Binding Until Discontinued  Until Discontinued         09/20/23 2116 09/20/23 2116  \"If patient tolerates food and liquids after completion of second bag of Pitocin, saline lock IV and discontinue IV fluid infusions.  Once         09/20/23 " 2116 09/20/23 2116  Breast pump to bed  Once         09/20/23 2116 09/20/23 2116  If indicated -- Please administer RH Immunoglobulin based on results of cord blood evaluation and fetal screen lab tests, pharmacy to dispense  Per Order Details        Comments: See Process Instructions For Reference Range Details.    09/20/23 2116 09/20/23 2115  diphenhydrAMINE (BENADRYL) capsule 25 mg  Every 4 Hours PRN         09/20/23 2116 09/20/23 2115  oxyCODONE (ROXICODONE) immediate release tablet 5 mg  Every 4 Hours PRN        See Hyperspace for full Linked Orders Report.    09/20/23 2116 09/20/23 2115  oxyCODONE (ROXICODONE) immediate release tablet 10 mg  Every 4 Hours PRN        See Hyperspace for full Linked Orders Report.    09/20/23 2116 09/20/23 2115  oxytocin (PITOCIN) 30 units in 0.9% sodium chloride 500 mL (premix)  Continuous PRN         09/20/23 2116 09/20/23 2030  ceFAZolin 3000 mg IVPB in 100 mL NS (VTB)  Once         09/20/23 1934 09/20/23 2030  azithromycin (ZITHROMAX) 500 mg in sodium chloride 0.9 % 250 mL IVPB-VTB  Once         09/20/23 1934 09/20/23 1946  ceFAZolin Sodium-Dextrose (ANCEF) 1-4 GM-%(50ML) IVPB (duplex)  - ADS Override Pull        Note to Pharmacy: Created by cabinet override    09/20/23 1946 09/20/23 1946  ceFAZolin Sodium-Dextrose (ANCEF) 2-3 GM-%(50ML) IVPB (duplex)  - ADS Override Pull        Note to Pharmacy: Created by cabinet override    09/20/23 1946 09/20/23 1935  Notify NICU to Attend Birth  Until Discontinued         09/20/23 1934 09/20/23 1934  Abdominal Prep With Clippers  Once         09/20/23 1934 09/20/23 1934  Chlorhexadine Skin Prep Unless Otherwise Indicated  Once         09/20/23 1934 09/20/23 1934  SCD (Sequential Compression Devices)  Once         09/20/23 1934 09/20/23 1934  Document Gatorade Consumption Prior to Admission (Yes or No)  Once         09/20/23 1934 09/20/23 1934  NPO Diet NPO Type: Strict NPO  Diet Effective  Now,   Status:  Canceled         09/20/23 1934 09/20/23 1934  Inpatient Anesthesiology Consult  Once        Specialty:  Anesthesiology  Provider:  (Not yet assigned)    09/20/23 1934    09/20/23 1800  ondansetron (ZOFRAN) injection 4 mg  Once         09/20/23 1707    09/20/23 1715  hydrOXYzine (ATARAX) tablet 10 mg  Once         09/20/23 1618    09/20/23 1400  oxytocin (PITOCIN) 30 units in 0.9% sodium chloride 500 mL (premix)  Titrated         09/20/23 1310    09/20/23 1315  lactated ringers bolus 1,000 mL  Once         09/20/23 1218    09/20/23 1315  fentanyl 2 mcg/ml and bupivacaine 0.125% epidural bolus from bag  Once        See Hyperspace for full Linked Orders Report.    09/20/23 1218    09/20/23 1315  fentaNYL (2 mcg/mL) and bupivacaine (0.125%) in 100 mL NS epidural  Continuous        See Hyperspace for full Linked Orders Report.    09/20/23 1218    09/20/23 1218  Vital Signs Per Anesthesia Guidelines  Per Order Details        Comments: Every 2 Minutes x5, Every 5 Minutes x4, Then If Stable Every 15 Minutes    09/20/23 1218    09/20/23 1218  Fetal Heart Rate Monitor  Once         09/20/23 1218    09/20/23 1218  Nurse or anesthesiologist to remain with patient for 15 minutes following dosing.  Until Discontinued         09/20/23 1218    09/20/23 1218  Facilitate maternal postion on side and maintain uterine displacement.  Until Discontinued         09/20/23 1218    09/20/23 1218  Consult anesthesia services prior to changing epidural infusion/rate.  Until Discontinued         09/20/23 1218    09/20/23 1218  Insert Indwelling Urinary Catheter  Once,   Status:  Canceled        Comments: Follow Protocol As Outlined in Process Instructions (Open Order Report to View Full Instructions)   See Hyperspace for full Linked Orders Report.    09/20/23 1218    09/20/23 1218  Assess Need for Indwelling Urinary Catheter - Follow Removal Protocol  Continuous,   Status:  Canceled        Comments: Follow Protocol As  Outlined in Process Instructions (Open Order Report to View Full Instructions)   See Hyperspace for full Linked Orders Report.    09/20/23 1218    09/20/23 1218  Urinary Catheter Care  Every Shift,   Status:  Canceled      See Hyperspace for full Linked Orders Report.    09/20/23 1218    09/20/23 1218  Notify physician for the following conditions:  Until Discontinued         09/20/23 1218    09/20/23 1218  Transfer to postpartum when discharge criteria met.  Until Discontinued         09/20/23 1218    09/20/23 1217  ePHEDrine Sulfate (Pressors) 5 MG/ML injection 10 mg  Every 10 Minutes PRN         09/20/23 1218    09/20/23 0900  sodium chloride 0.9 % flush 10 mL  Every 12 Hours Scheduled         09/20/23 0613    09/20/23 0800  Vital Signs q 4 while awake  Every 4 Hours      Comments: While the patient is awake.    09/20/23 0613    09/20/23 0700  lactated ringers infusion  Continuous         09/20/23 0613    09/20/23 0700  mineral oil liquid 30 mL  Once         09/20/23 0613    09/20/23 0658  ABO RH Specimen Verification  STAT         09/20/23 0657    09/20/23 0614  Weigh Patient Daily  Daily       09/20/23 0613    09/20/23 0613  Urine Drug Screen - Urine, Clean Catch  Once         09/20/23 0613    09/20/23 0610  Admit To Obstetrics Inpatient  Once         09/20/23 0613    09/20/23 0610  Code Status and Medical Interventions:  Continuous,   Status:  Canceled         09/20/23 0613    09/20/23 0610  Obtain Informed Consent  Once         09/20/23 0613    09/20/23 0610  Place Sequential Compression Device  Once         09/20/23 0613    09/20/23 0610  Maintain Sequential Compression Device  Continuous         09/20/23 0613    09/20/23 0610  Mini-Prep Prior to Delivery  Once         09/20/23 0613    09/20/23 0610  Continuous Fetal Monitoring With NST on Admission and Prior to Initiation of Oxytocin.  Per Order Details        Comments: Continuous Fetal Monitoring With NST on Admission & Prior to Initiation of Oxytocin.     23 0613    23 0610  External Uterine Contraction Monitoring  Per Hospital Policy         23 0613    23 0610  Notify Provider (Specified)  Until Discontinued         23 0610  Notify Provider of Tachysystole (Per Hospital Algorithm)  Until Discontinued         2313    23 0610  Notify Provider if Membranes Ruptured, Bleeding Greater Than 1 Pad Per Hour, Fetal Heart Tone Abnormality or Severe Pain  Until Discontinued         23 0610  Initiate Group Beta Strep (GBS) Prophylaxis Protocol, If Criteria Met  Continuous        Comments: NO TREATMENT RECOMMENDED IF: 1) Maternal GBS Status Known Negative 2) Scheduled  Birth With Intact Membranes, Not in Labor 3) Maternal GBS Status Unknown, No Risk Factors  TREAT WITH ANTIBIOTICS IF:  1) Maternal GBS Status Known Positive 2) Maternal GBS Status Unknown With Risk Factors: a)  Previous Infant Affected By GBS Infection b) GBS Urinary Tract Infection (UTI) or Bacteriuria During Pregnancy c) Unexplained Maternal Fever (100.4F (38C) or Greater) During Labor d)  Prolonged Rupture of Membranes (18 or More Hours) e) Gestational Age Less Than 37 Weeks    23 0623 0610  NPO Diet NPO Type: Ice Chips  Diet Effective Now,   Status:  Canceled         23 0610  CBC (No Diff)  Once         23 0613    23 0610  Type & Screen  Once         23 0613    23 0610  Insert Peripheral IV  Once         2313    23 0610  Saline Lock & Maintain IV Access  Continuous         23 0623 0609  acetaminophen (TYLENOL) tablet 650 mg  Every 4 Hours PRN         23 0613    23 0609  sodium chloride 0.9 % flush 10 mL  As Needed         23 0613    23 0609  sodium chloride 0.9 % infusion 40 mL  As Needed         23 0613    23 0609  lidocaine (XYLOCAINE) 1 % injection 0.5 mL  Once As Needed          23 0613    23 0609  lactated ringers bolus 1,000 mL  Once As Needed         23    Unscheduled  Position Change - For Intra-Uterine Resusitation for Hypertonus, HyperStimulation or Non-Reassuring Fetal Status  As Needed       23 06    Unscheduled  Blood Gas, Arterial -  As Needed       23 1240    Unscheduled  Up with Assistance  As Needed       23    Unscheduled  Bladder Scan if Patient Unable to Void 4-6 Hours After Catheter Removal  As Needed         23    Unscheduled  Straight Cath Every 4-6 Hours As Needed If Patient is Unable to Void After 4-6 Hours, Bladder Scan Volume is Greater Than 500mL & Patient Has Symptoms of Bladder Discomfort / Distention  As Needed       23    Unscheduled  Schedule / Prompt Voiding For Patients With Urinary Incontinence  As Needed       23    Unscheduled  Chewing Gum  As Needed       23    Unscheduled  Warm compress  As Needed       23    Unscheduled  Apply ace wrap, tight bra, or binder  As Needed       23    Unscheduled  Apply ice packs  As Needed       23                     Operative/Procedure Notes (last 72 hours)        Williams Ruiz DO at 23 0630          COOK Catheter Placement     Indications: IOL    Post procedure diagnosis:  CLARE    Procedure Details     The risks and benefits of the procedure and Verbal informed consent obtained.    Speculum placed in vagina and excellent visualization of cervix achieved. Cook catheter placed in standard fashion; inflating both balloons with sterile water.     FWB noted while placing Cook catheter.     Tolerated well  No apparent complication     Findings: Unfavorable CVX    Complications: pain.      Plan:  1) Continue with IOL   2) Anticipate ; Operative delivery as indicated   3) Pain medications as needed.       Williams Ruiz DO   2023  09:46 EDT     Electronically signed by Williams Ruiz  DO RAMONA at 23       Williams Ruiz DO at 23          [facility]   Section Operative Note    Pre-Operative Dx:   1) 33 y.o.   2) IUP at 40+  3) Indications for  section: NRFHM         Postoperative dx:     Same, plus:  Hx of LTCS,   Obesity,   Hashimoto's ,   Atrial tahycardia.      Procedure:  RLTCS   Surgeon:   Williams Escalona DO    Assistant: Catilin Piedra CFA and Dr. Geoffrey Law  Assistant: Caitlin Piedra CSA was responsible for performing the following activities: Retraction, Suction, Irrigation, Suturing, Closing, Placing Dressing, and Delivery of Fetus and their skilled assistance was necessary for the success of this case.    Anesthesia:  Epidural    EBL:   600 mls.  600  mls.         IV Fluids: 600 mls.   UOP: 100 mls.    I/O this shift:  In: -   Out: 650 [Urine:650]   Antibiotics: cefazolin (Ancef) and Zithromax     Infant:      Time of Delivery     Gender: male  infant    Weight: No birth weight on file.     Apgars:   @ 1 minute /       @ 5 minutes      Meconium:   Nuchal Cord:    no  No; True knot             Indication for C/Section: NRFHM                                      Procedure Details:       Procedure:  Under epidural anaesthetic with a thomas catheter inserted, the patient was prepped and draped in the usual sterile fashion in the supine position with a leftward tilt. A Pfannensteil incision was made through the patients previous incision. The incision was carried down to the fascia with sharp dissection/cautery. The fascia was incised transversely and dissected off the rectus muscle using sharp dissection. Electrocautery was used for hemostasis. The peritoneum was opened taking care not to injure the bladder. The vesicouterine peritoneum was dissected off the lower uterine segment. The lower segment was assessed and a low transverse incision was made. The uterine incision was extended bluntly.     The fetus was presenting as a vertex. The head  was delivered without difficulty and the rest of the body followed easily. The cord was clamped twice and cut and the baby transferred to the warmer, awaiting the nursing/pediatric staff. The placenta was then delivered spontaneously. The uterus was explored and was empty of all tissue. The uterus was exteriorized for better visualization. The uterine incision was then closed in two layers with vicryl suture. The first layer was locking and the second layer was imbricating. Tubes and ovaries were examined and appeared normal. The peritoneum was closed in the standard fashion.   With the uterine closure complete and hemostasis achieved, attention was turned to the fascia. It was closed in a running unlocked fashion. The skin was then reapproximated with a running vicryl subcuticular suture and deep dermals placed x3. Dermal adhesive applied.     Wound vacuum applied (Avelle in standard practice)     At the end of the procedure all sponges, instruments, and sharps were counted and correct. Estimated blood loss was 600 ml. The patient and baby were taken to the recovery in stable condition.             Complications:   None      Disposition:   Mother to Mother Baby/Postpartum  in stable  condition currently.   Baby to NICU  in stable condition currently.       Williams Ruiz DO  2023  21:17 EDT         Electronically signed by Williams Ruiz DO at 23 2139          Physician Progress Notes (last 72 hours)        Heather gN MD at 23 [phone]          [facility]    Progress Note       Patient Name: Theresa SAUL  :  1990  MRN:  [id number]    Subjective      Subjective  Postpartum Day 1/2:     The patient feels well.  Her pain is well controlled with prescribed pain medications.   She is ambulating well.  Patient describes her bleeding as moderate lochia. Infant was transferred last night, pt desires a pass later today.     Breastfeeding: has not attempted yet., pt  pumping    Objective       /50 (BP Location: Left arm, Patient Position: Lying)   Pulse 78   Temp 98.3 °F (36.8 °C) (Oral)   Resp 18   LMP 12/13/2022   SpO2 100%   Breastfeeding Unknown     Review of Systems   Constitutional:  Positive for activity change. Negative for appetite change, fatigue, fever and unexpected weight change.   Eyes:  Negative for photophobia and visual disturbance.   Respiratory:  Negative for cough and shortness of breath.    Cardiovascular:  Negative for chest pain and palpitations.   Gastrointestinal:  Negative for abdominal distention, abdominal pain, constipation, diarrhea and nausea.   Endocrine: Negative for cold intolerance and heat intolerance.   Genitourinary:  Positive for vaginal bleeding. Negative for dyspareunia, dysuria, menstrual problem, pelvic pain and vaginal discharge.   Musculoskeletal:  Negative for back pain.   Skin:  Negative for color change and rash.   Neurological:  Negative for headaches.   Hematological:  Negative for adenopathy. Bruises/bleeds easily.   Psychiatric/Behavioral:  Positive for sleep disturbance. Negative for dysphoric mood. The patient is not nervous/anxious.    Physical Exam:    Physical Exam  Vitals and nursing note reviewed.   Constitutional:       Appearance: Normal appearance. She is well-developed.   HENT:      Head: Normocephalic and atraumatic.   Eyes:      General: No scleral icterus.     Conjunctiva/sclera: Conjunctivae normal.   Neck:      Thyroid: No thyromegaly.   Abdominal:      General: There is no distension.      Palpations: Abdomen is soft. There is no mass.      Tenderness: There is no abdominal tenderness. There is no guarding or rebound.      Hernia: No hernia is present.      Comments: Bandage C/D/I   Skin:     General: Skin is warm and dry.      Findings: Bruising (IV sites) present.   Neurological:      Mental Status: She is alert and oriented to person, place, and time.   Psychiatric:         Mood and Affect: Mood  normal.         Behavior: Behavior normal.         Thought Content: Thought content normal.         Judgment: Judgment normal.         Lab results reviewed:  Yes.   Results from last 7 days   Lab Units 23  0348   HEMOGLOBIN g/dL 9.9*     Infant: male  Feeding:breast and bottle  Rh status: positive, Rhogam was not indicated  Rubella: Immune  Contraception: undecided        Assessment & Plan      1) PPD#1/2: Progressing well. Hgb:9.9, start iron    2) Infant transferred- if pt ambulating and voiding w/o a catheter, can have 4-6 hour pass later today    3) Postpartum Care: advance    3) Dispo: home tomorrow if well.         Heather Ng MD    2023  07:24 EDT        Electronically signed by Heather Ng MD at 23 0736       Edinson Law MD at 23          Into speak with parents.  Questions answered.  They would desire to move forward with repeat low-transverse  section given the fact that we have to keep turning off the pit or we have recurrent late decelerations.  Fetal status is reassuring at this time but concerning given thick meconium.  Neonatologist has been notified and is in route.  Second partner to come in to help with difficult surgery due to increased BMI.  Anesthesia was notified.  Patient and spouse agreeable with plan.  Everyone comfortable with moving forward at this time.    Edinson Law MD      Electronically signed by Edinson Law MD at 23       Edinson Law MD at 23          Called to see patient.  Recurrent late decelerations on Pitocin.  Pitocin stopped.  Long-term variability remains but has persistent recurrent late decelerations consistent with placental insufficiency.  Also has thick meconium as noted at checkout.  No cervical changes staff had just checked the patient recently.  Discussed with the patient and her significant other concern for placental  insufficiency and no cervical change.  Will need Pitocin.  Of course still has risk of uterine rupture.  Concerned that pushing the fetus and delivering a compromise fetus may in fact be even more damaging to transition to extra fetal life.  Plan to have  present which the patient agrees with.  Discussing primary  for placental insufficiency, recurrent late decelerations and unable to adequately produce and be used to affect labor.  Patient's questions answered.  Parents considering.    Edinson Law MD      Electronically signed by Edinson Law MD at 23 1921       Williams Ruiz DO at 23 1231          Epidural in place   2-3/80/-2 AROM with cervical exam; thick meconium   FWB with moderate variability   Continue with IOL; operative delivery as indicated     Electronically signed by Williams Ruiz DO at 23 1232       Consult Notes (last 72 hours)  Notes from 23 1536 through 23 1536   No notes of this type exist for this encounter.       Discharge Summary    No notes of this type exist for this encounter.

## 2023-09-21 NOTE — ADDENDUM NOTE
Addendum  created 09/20/23 2218 by Vianca Rincon CRNA    Intraprocedure Event edited, Intraprocedure Meds edited

## 2023-09-21 NOTE — NURSING NOTE
Discharge instructions went over with pt. Pt verbalizes understanding. Follow up appointment on Monday. Instructed how to shower with wound vac. Pt discharged in wheelchair accompanied by .

## 2023-09-21 NOTE — OP NOTE
KARI Humphreys   Section Operative Note    Pre-Operative Dx:   1) 33 y.o.   2) IUP at 40+  3) Indications for  section: NRFHM         Postoperative dx:     Same, plus:  Hx of LTCS,   Obesity,   Hashimoto's ,   Atrial tahycardia.      Procedure:  RLTCS   Surgeon:   Williams Ruiz. DO    Assistant: Caitlin Piedra CFA and Dr. Geoffrey Law  Assistant: Caitlin Piedra CSA was responsible for performing the following activities: Retraction, Suction, Irrigation, Suturing, Closing, Placing Dressing, and Delivery of Fetus and their skilled assistance was necessary for the success of this case.    Anesthesia:  Epidural    EBL:   600 mls.  600  mls.         IV Fluids: 600 mls.   UOP: 100 mls.    I/O this shift:  In: -   Out: 650 [Urine:650]   Antibiotics: cefazolin (Ancef) and Zithromax     Infant:      Time of Delivery     Gender: male  infant    Weight: No birth weight on file.     Apgars:   @ 1 minute /       @ 5 minutes      Meconium:   Nuchal Cord:    no  No; True knot             Indication for C/Section: NRFHM                                      Procedure Details:       Procedure:  Under epidural anaesthetic with a thomas catheter inserted, the patient was prepped and draped in the usual sterile fashion in the supine position with a leftward tilt. A Pfannensteil incision was made through the patients previous incision. The incision was carried down to the fascia with sharp dissection/cautery. The fascia was incised transversely and dissected off the rectus muscle using sharp dissection. Electrocautery was used for hemostasis. The peritoneum was opened taking care not to injure the bladder. The vesicouterine peritoneum was dissected off the lower uterine segment. The lower segment was assessed and a low transverse incision was made. The uterine incision was extended bluntly.     The fetus was presenting as a vertex. The head was delivered without difficulty and the rest of the body followed easily. The  cord was clamped twice and cut and the baby transferred to the warmer, awaiting the nursing/pediatric staff. The placenta was then delivered spontaneously. The uterus was explored and was empty of all tissue. The uterus was exteriorized for better visualization. The uterine incision was then closed in two layers with vicryl suture. The first layer was locking and the second layer was imbricating. Tubes and ovaries were examined and appeared normal. The peritoneum was closed in the standard fashion.   With the uterine closure complete and hemostasis achieved, attention was turned to the fascia. It was closed in a running unlocked fashion. The skin was then reapproximated with a running vicryl subcuticular suture and deep dermals placed x3. Dermal adhesive applied.     Wound vacuum applied (Avelle in standard practice)     At the end of the procedure all sponges, instruments, and sharps were counted and correct. Estimated blood loss was 600 ml. The patient and baby were taken to the recovery in stable condition.             Complications:   None      Disposition:   Mother to Mother Baby/Postpartum  in stable  condition currently.   Baby to NICU  in stable condition currently.       Williams Ruiz DO  9/20/2023  21:17 EDT

## 2023-09-21 NOTE — NURSING NOTE
MsKrystian Zuniga given permission from Dr. Ruiz to go on 6-hour pass to visit with infant at [facility]. Pt signed waiver and instructed to return no later than 6 hours.

## 2023-09-21 NOTE — ANESTHESIA POSTPROCEDURE EVALUATION
Patient: Theresa SAUL    Procedure Summary       Date: 23 Room / Location: [facility] LABOR DELIVERY  [facility] LABOR DELIVERY    Anesthesia Start:  Anesthesia Stop:     Procedure:  SECTION PRIMARY (Abdomen) Diagnosis: (Fetal Intolerance of Labor)    Surgeons: Edinson Law MD Provider: Lelia Pickett MD    Anesthesia Type: epidural ASA Status: 3            Anesthesia Type: epidural    Vitals  Vitals Value Taken Time   /65 23   Temp 98 °F (36.7 °C) 23   Pulse 83 230   Resp 18 23   SpO2 100 % 23   Vitals shown include unvalidated device data.        Post Anesthesia Care and Evaluation    Patient location during evaluation: bedside  Patient participation: complete - patient participated  Level of consciousness: awake  Pain management: adequate    Airway patency: patent  Anesthetic complications: No anesthetic complications  PONV Status: none  Cardiovascular status: acceptable  Respiratory status: acceptable  Hydration status: acceptable

## 2023-09-21 NOTE — PROGRESS NOTES
KARI Samuel    Progress Note       Patient Name: Theresa SAUL  :  1990  MRN:  [id number]          Subjective  Postpartum Day 1/2:     The patient feels well.  Her pain is well controlled with prescribed pain medications.   She is ambulating well.  Patient describes her bleeding as moderate lochia. Infant was transferred last night, pt desires a pass later today.     Breastfeeding: has not attempted yet., pt pumping           /50 (BP Location: Left arm, Patient Position: Lying)   Pulse 78   Temp 98.3 °F (36.8 °C) (Oral)   Resp 18   LMP 2022   SpO2 100%   Breastfeeding Unknown     Review of Systems   Constitutional:  Positive for activity change. Negative for appetite change, fatigue, fever and unexpected weight change.   Eyes:  Negative for photophobia and visual disturbance.   Respiratory:  Negative for cough and shortness of breath.    Cardiovascular:  Negative for chest pain and palpitations.   Gastrointestinal:  Negative for abdominal distention, abdominal pain, constipation, diarrhea and nausea.   Endocrine: Negative for cold intolerance and heat intolerance.   Genitourinary:  Positive for vaginal bleeding. Negative for dyspareunia, dysuria, menstrual problem, pelvic pain and vaginal discharge.   Musculoskeletal:  Negative for back pain.   Skin:  Negative for color change and rash.   Neurological:  Negative for headaches.   Hematological:  Negative for adenopathy. Bruises/bleeds easily.   Psychiatric/Behavioral:  Positive for sleep disturbance. Negative for dysphoric mood. The patient is not nervous/anxious.    Physical Exam:    Physical Exam  Vitals and nursing note reviewed.   Constitutional:       Appearance: Normal appearance. She is well-developed.   HENT:      Head: Normocephalic and atraumatic.   Eyes:      General: No scleral icterus.     Conjunctiva/sclera: Conjunctivae normal.   Neck:      Thyroid: No thyromegaly.   Abdominal:      General: There is no distension.       Palpations: Abdomen is soft. There is no mass.      Tenderness: There is no abdominal tenderness. There is no guarding or rebound.      Hernia: No hernia is present.      Comments: Bandage C/D/I   Skin:     General: Skin is warm and dry.      Findings: Bruising (IV sites) present.   Neurological:      Mental Status: She is alert and oriented to person, place, and time.   Psychiatric:         Mood and Affect: Mood normal.         Behavior: Behavior normal.         Thought Content: Thought content normal.         Judgment: Judgment normal.         Lab results reviewed:  Yes.   Results from last 7 days   Lab Units 09/21/23  0348   HEMOGLOBIN g/dL 9.9*     Infant: male  Feeding:breast and bottle  Rh status: positive, Rhogam was not indicated  Rubella: Immune  Contraception: undecided              1) PPD#1/2: Progressing well. Hgb:9.9, start iron    2) Infant transferred- if pt ambulating and voiding w/o a catheter, can have 4-6 hour pass later today    3) Postpartum Care: advance    3) Dispo: home tomorrow if well.         Heather Ng MD    9/21/2023  07:24 EDT

## 2023-09-21 NOTE — PROGRESS NOTES
Patient: Theresa SAUL  Procedure(s):   SECTION REPEAT  Anesthesia type: epidural    Patient location: Labor and Delivery  Last vitals:   Vitals:    23 0700   BP: 94/55   Pulse: 72   Resp: 16   Temp: 98.2 °F (36.8 °C)   SpO2: 98%     Level of consciousness: awake, alert, and oriented    Post-anesthesia pain: adequate analgesia  Airway patency: patent  Respiratory: unassisted  Cardiovascular: stable and blood pressure at baseline  Hydration: euvolemic    Anesthetic complications: no, some pruritis

## 2023-09-21 NOTE — ADDENDUM NOTE
Addendum  created 09/20/23 2154 by Vianca Rincon, ARIEL    Review and Sign - Ready for Procedure

## 2023-09-23 NOTE — CASE MANAGEMENT/SOCIAL WORK
Case Management Discharge Note      Final Note: dc home         Selected Continued Care - Discharged on 9/21/2023 Admission date: 9/20/2023 - Discharge disposition: Home or Self Care      Destination    No services have been selected for the patient.                Durable Medical Equipment    No services have been selected for the patient.                Dialysis/Infusion    No services have been selected for the patient.                Home Medical Care    No services have been selected for the patient.                Therapy    No services have been selected for the patient.                Community Resources    No services have been selected for the patient.                Community & DME    No services have been selected for the patient.                         Final Discharge Disposition Code: 01 - home or self-care

## 2023-09-25 ENCOUNTER — POSTPARTUM VISIT (OUTPATIENT)
Dept: OBSTETRICS AND GYNECOLOGY | Facility: CLINIC | Age: 33
End: 2023-09-25

## 2023-09-25 VITALS
DIASTOLIC BLOOD PRESSURE: 84 MMHG | SYSTOLIC BLOOD PRESSURE: 138 MMHG | BODY MASS INDEX: 53.81 KG/M2 | WEIGHT: 285 LBS | HEIGHT: 61 IN

## 2023-09-25 DIAGNOSIS — Z87.59 HISTORY OF GESTATIONAL HYPERTENSION: ICD-10-CM

## 2023-09-25 DIAGNOSIS — I47.19 ATRIAL TACHYCARDIA: Primary | ICD-10-CM

## 2023-09-25 PROBLEM — Z36.9 ENCOUNTER FOR ANTENATAL SCREENING, UNSPECIFIED: Status: RESOLVED | Noted: 2023-07-11 | Resolved: 2023-09-25

## 2023-09-25 PROBLEM — O34.211 MATERNAL CARE DUE TO LOW TRANSVERSE UTERINE SCAR FROM PREVIOUS CESAREAN DELIVERY: Status: RESOLVED | Noted: 2023-07-11 | Resolved: 2023-09-25

## 2023-09-25 PROBLEM — Z34.90 PREGNANT: Status: RESOLVED | Noted: 2023-09-20 | Resolved: 2023-09-25

## 2023-09-25 PROBLEM — Z34.93 PRENATAL CARE IN THIRD TRIMESTER: Status: RESOLVED | Noted: 2023-08-17 | Resolved: 2023-09-25

## 2023-09-25 PROBLEM — O99.210 OBESITY IN PREGNANCY, ANTEPARTUM: Status: RESOLVED | Noted: 2023-07-11 | Resolved: 2023-09-25

## 2023-09-25 RX ORDER — OXYCODONE HYDROCHLORIDE AND ACETAMINOPHEN 5; 325 MG/1; MG/1
1 TABLET ORAL EVERY 4 HOURS PRN
Qty: 10 TABLET | Refills: 0 | Status: SHIPPED | OUTPATIENT
Start: 2023-09-25

## 2023-09-25 NOTE — PROGRESS NOTES
Postpartum Note    Chief Complaint: Postpartum Visit    HPI      Date of delivery: [id number]    The patient feels well. Patient describes her bleeding as thin lochia.     Here to remove wound vacuum. Does wish it had a clip or carrier; at times noise annoying. No other concerns     Breastfeeding: Going well. Infant discharged from [facility] ( [facility])     Review of Systems    The following portions of the patient's history were reviewed and updated as appropriate: allergies, current medications and problem list.             There were no vitals taken for this visit.      Physical Exam  Constitutional:       Appearance: Normal appearance. She is obese.   Cardiovascular:      Pulses: Normal pulses.      Heart sounds: Normal heart sounds.   Pulmonary:      Effort: Pulmonary effort is normal.      Breath sounds: Normal breath sounds.   Abdominal:      General: Abdomen is flat.      Palpations: Abdomen is soft.          Comments: Wound vacuum removed   Pfannenstiel healing    Neurological:      General: No focal deficit present.      Mental Status: She is alert and oriented to person, place, and time.   Psychiatric:         Mood and Affect: Mood normal.         Behavior: Behavior normal.                     1) PPD#5: Progressing well.     2) Postpartum Care: Doing well  Vacuum removed today     3) Gyn HM: Pap May 22 NILM and co test Neg    4) Contraception: Unsure; We will discuss next appt     5) FU 2-3 weeks for Incision check. EPDS at that time.     Pt and her spouse our a lluvia couple and it has been a pleasure to deliver that sweet boy!           Williams Ruiz DO   9/25/2023  11:34 EDT

## 2023-09-26 RX ORDER — HYDROXYZINE HYDROCHLORIDE 10 MG/1
TABLET, FILM COATED ORAL
Qty: 30 TABLET | Refills: 0 | Status: SHIPPED | OUTPATIENT
Start: 2023-09-26

## 2023-09-28 LAB
LAB AP CASE REPORT: NORMAL
PATH REPORT.FINAL DX SPEC: NORMAL

## 2023-10-10 ENCOUNTER — POSTPARTUM VISIT (OUTPATIENT)
Dept: OBSTETRICS AND GYNECOLOGY | Facility: CLINIC | Age: 33
End: 2023-10-10
Payer: COMMERCIAL

## 2023-10-10 VITALS
BODY MASS INDEX: 51.54 KG/M2 | WEIGHT: 273 LBS | DIASTOLIC BLOOD PRESSURE: 86 MMHG | SYSTOLIC BLOOD PRESSURE: 126 MMHG | HEIGHT: 61 IN

## 2023-10-10 DIAGNOSIS — Z87.59 HISTORY OF GESTATIONAL HYPERTENSION: ICD-10-CM

## 2023-10-10 NOTE — PROGRESS NOTES
"Postpartum Note    Chief Complaint: Postpartum Visit    HPI      Date of delivery: [id number]    The patient feels well. Patient describes her bleeding as thin lochia.     Asks when she can work out, bathe, be intimate. Some incisional pain; Doing well overall     Breastfeeding: Going well. Infant discharged from [facility] @ last appt ( [facility])     Review of Systems    The following portions of the patient's history were reviewed and updated as appropriate: allergies, current medications and problem list.             /86   Ht 154.9 cm (60.98\")   Wt 124 kg (273 lb)   LMP  (Exact Date)   Breastfeeding Yes   BMI 51.61 kg/mý       Physical Exam  Constitutional:       Appearance: Normal appearance. She is obese.   Cardiovascular:      Pulses: Normal pulses.      Heart sounds: Normal heart sounds.   Pulmonary:      Effort: Pulmonary effort is normal.      Breath sounds: Normal breath sounds.   Abdominal:      General: Abdomen is flat.      Palpations: Abdomen is soft.          Comments: Pfannenstiel Well healed    Neurological:      General: No focal deficit present.      Mental Status: She is alert and oriented to person, place, and time.   Psychiatric:         Mood and Affect: Mood normal.         Behavior: Behavior normal.                       1) PPD#~ 3 weeks Progressing well.     2) Postpartum Care: Doing well  Vacuum removed today     3) Gyn HM: Pap May 22 NILM and co test Neg  Repeat Pap in 3 months with her dysplasia Hx     4) Contraception: Vasectomy planned  Bridge therapy if needed     5) FU 3 months for Gyn annual     Again pt and her spouse our a lluvia couple and it has been a pleasure to deliver that sweet boy!           Williams Ruiz, DO   10/10/2023  09:31 EDT     Answers submitted by the patient for this visit:  Other (Submitted on 10/8/2023)  Please describe your symptoms.: Incision check  Have you had these symptoms before?: No  How long have you been having these symptoms?: Greater " than 2 weeks  Primary Reason for Visit (Submitted on 10/8/2023)  What is the primary reason for your visit?: Other

## 2023-10-12 RX ORDER — CLOTRIMAZOLE 1 G/ML
SOLUTION TOPICAL
Qty: 10 ML | Refills: 0 | Status: SHIPPED | OUTPATIENT
Start: 2023-10-12

## 2025-07-25 ENCOUNTER — OFFICE VISIT (OUTPATIENT)
Dept: OBSTETRICS AND GYNECOLOGY | Facility: CLINIC | Age: 35
End: 2025-07-25
Payer: COMMERCIAL

## 2025-07-25 VITALS
BODY MASS INDEX: 37.61 KG/M2 | DIASTOLIC BLOOD PRESSURE: 76 MMHG | SYSTOLIC BLOOD PRESSURE: 108 MMHG | WEIGHT: 199.2 LBS | HEIGHT: 61 IN

## 2025-07-25 DIAGNOSIS — N63.23 MASS OF LOWER OUTER QUADRANT OF LEFT BREAST: Primary | ICD-10-CM

## 2025-07-25 RX ORDER — LEVOTHYROXINE SODIUM 75 UG/1
TABLET ORAL
COMMUNITY
Start: 2025-01-30

## 2025-07-25 NOTE — PROGRESS NOTES
"Subjective     Chief Complaint   Patient presents with    LUMP ON LEFT BREAST        Theresa SAUL is a 35 y.o.  whose LMP is Patient's last menstrual period was 2025 (exact date).. This patient is new to me- no.  She presents with lump in left breast    HPI    Feels lump in left breast that is she is concerned about.  Had a lump there in the past that has been imaged with MMG/US- normal findings.  Saw Dr. Zapien in . Told she had dense breasts.  Unsure if it has changed; she got pregnant and breasts got larger.          The following portions of the patient's history were reviewed and updated as appropriate:vital signs, allergies, current medications, past medical history, past social history, past surgical history, and problem list      Review of Systems     Review of Systems   Genitourinary:  Positive for breast lump. Negative for breast discharge and breast pain.       Objective      /76   Ht 154.9 cm (61\")   Wt 90.4 kg (199 lb 3.2 oz)   LMP 2025 (Exact Date)   Breastfeeding Unknown   BMI 37.64 kg/m²     Physical Exam    Physical Exam  Vitals reviewed.   Constitutional:       General: She is awake. She is not in acute distress.     Appearance: She is not ill-appearing.   Eyes:      Conjunctiva/sclera: Conjunctivae normal.   Pulmonary:      Effort: Pulmonary effort is normal. No respiratory distress.   Chest:   Breasts:     Left: Mass present. No swelling, bleeding, inverted nipple, nipple discharge, skin change or tenderness.       Musculoskeletal:      Cervical back: Neck supple. No rigidity.   Lymphadenopathy:      Upper Body:      Left upper body: No axillary adenopathy.   Skin:     General: Skin is warm and dry.      Capillary Refill: Capillary refill takes less than 2 seconds.   Neurological:      Mental Status: She is alert and oriented to person, place, and time.   Psychiatric:         Mood and Affect: Mood and affect normal.         Behavior: Behavior normal. "           Lab Review   Labs: No data reviewed     Imaging: No data reviewed      Assessment/Plan  Diagnoses and all orders for this visit:    1. Mass of lower outer quadrant of left breast (Primary)  -     US Breast Left Complete; Future  -     Mammo Diagnostic Digital Tomosynthesis Left With CAD; Future      Due for AE/Pap- appt scheduled in August.    Return if symptoms worsen or fail to improve, for Keep scheduled follow up appointment.        Misti Wilder, APRN  7/25/2025  10:36 EDT

## 2025-08-26 ENCOUNTER — OFFICE VISIT (OUTPATIENT)
Dept: OBSTETRICS AND GYNECOLOGY | Facility: CLINIC | Age: 35
End: 2025-08-26
Payer: COMMERCIAL

## 2025-08-26 VITALS
BODY MASS INDEX: 36.91 KG/M2 | SYSTOLIC BLOOD PRESSURE: 128 MMHG | HEIGHT: 61 IN | DIASTOLIC BLOOD PRESSURE: 84 MMHG | WEIGHT: 195.5 LBS

## 2025-08-26 DIAGNOSIS — Z11.51 SPECIAL SCREENING EXAMINATION FOR HUMAN PAPILLOMAVIRUS (HPV): ICD-10-CM

## 2025-08-26 DIAGNOSIS — Z01.419 ROUTINE GYNECOLOGICAL EXAMINATION: ICD-10-CM

## 2025-08-26 DIAGNOSIS — Z01.419 CERVICAL SMEAR, AS PART OF ROUTINE GYNECOLOGICAL EXAMINATION: Primary | ICD-10-CM

## 2025-08-26 PROBLEM — Z34.90 PREGNANCY: Status: RESOLVED | Noted: 2023-08-06 | Resolved: 2025-08-26

## 2025-08-26 PROBLEM — Z87.59 HISTORY OF GESTATIONAL HYPERTENSION: Status: RESOLVED | Noted: 2023-08-22 | Resolved: 2025-08-26

## 2025-08-26 LAB
B-HCG UR QL: NEGATIVE
BILIRUB BLD-MCNC: NEGATIVE MG/DL
CLARITY, POC: CLEAR
COLOR UR: YELLOW
EXPIRATION DATE: NORMAL
GLUCOSE UR STRIP-MCNC: NEGATIVE MG/DL
INTERNAL NEGATIVE CONTROL: NORMAL
INTERNAL POSITIVE CONTROL: NORMAL
KETONES UR QL: ABNORMAL
LEUKOCYTE EST, POC: NEGATIVE
Lab: NORMAL
NITRITE UR-MCNC: NEGATIVE MG/ML
PH UR: 5 [PH] (ref 5–8)
PROT UR STRIP-MCNC: ABNORMAL MG/DL
RBC # UR STRIP: ABNORMAL /UL
SP GR UR: 1.01 (ref 1–1.03)
UROBILINOGEN UR QL: ABNORMAL

## 2025-08-26 RX ORDER — FLUOCINOLONE ACETONIDE 0.1 MG/ML
SOLUTION TOPICAL
COMMUNITY
Start: 2025-05-30

## 2025-08-27 RX ORDER — NITROFURANTOIN 25; 75 MG/1; MG/1
100 CAPSULE ORAL 2 TIMES DAILY
Qty: 14 CAPSULE | Refills: 0 | Status: SHIPPED | OUTPATIENT
Start: 2025-08-27 | End: 2025-09-03

## 2025-08-28 LAB
CYTOLOGIST CVX/VAG CYTO: NORMAL
CYTOLOGY CVX/VAG DOC CYTO: NORMAL
CYTOLOGY CVX/VAG DOC THIN PREP: NORMAL
DX ICD CODE: NORMAL
HPV I/H RISK 4 DNA CVX QL PROBE+SIG AMP: NEGATIVE
OTHER STN SPEC: NORMAL
SERVICE CMNT-IMP: NORMAL
STAT OF ADQ CVX/VAG CYTO-IMP: NORMAL

## (undated) DEVICE — SUCTION CANISTER, 1000CC,SAFELINER: Brand: DEROYAL

## (undated) DEVICE — TRY SKINPREP DRYPREP

## (undated) DEVICE — PK C/SECT 40

## (undated) DEVICE — GLV SURG SENSICARE W/ALOE PF LF 7.5 STRL

## (undated) DEVICE — APPL CHLORAPREP W/TINT 26ML ORNG

## (undated) DEVICE — ANTIBACTERIAL UNDYED BRAIDED (POLYGLACTIN 910), SYNTHETIC ABSORBABLE SUTURE: Brand: COATED VICRYL

## (undated) DEVICE — PREP SOL POVIDONE/IODINE BT 4OZ

## (undated) DEVICE — SUT VIC 0 CTX 36IN J978H

## (undated) DEVICE — SOL IRR H2O BTL 1000ML STRL

## (undated) DEVICE — HYDROGEL COATED LATEX URINE METER FOLEY TRAY,16 FR/CH (5.3 MM), 5 ML CATHETER PRE-CONNECTED TO 2000 ML DRAINAGE BAG WITH NEEDLE SAMPLING: Brand: DOVER